# Patient Record
Sex: FEMALE | Race: WHITE | NOT HISPANIC OR LATINO | Employment: FULL TIME | URBAN - METROPOLITAN AREA
[De-identification: names, ages, dates, MRNs, and addresses within clinical notes are randomized per-mention and may not be internally consistent; named-entity substitution may affect disease eponyms.]

---

## 2017-11-22 ENCOUNTER — TRANSCRIBE ORDERS (OUTPATIENT)
Dept: ADMINISTRATIVE | Facility: HOSPITAL | Age: 42
End: 2017-11-22

## 2017-11-22 DIAGNOSIS — Z12.31 VISIT FOR SCREENING MAMMOGRAM: Primary | ICD-10-CM

## 2017-12-15 ENCOUNTER — TRANSCRIBE ORDERS (OUTPATIENT)
Dept: ADMINISTRATIVE | Facility: HOSPITAL | Age: 42
End: 2017-12-15

## 2017-12-15 DIAGNOSIS — N63.0 BREAST LUMP: Primary | ICD-10-CM

## 2017-12-15 DIAGNOSIS — N64.4 BREAST PAIN: ICD-10-CM

## 2017-12-22 ENCOUNTER — APPOINTMENT (OUTPATIENT)
Dept: MAMMOGRAPHY | Facility: HOSPITAL | Age: 42
End: 2017-12-22

## 2017-12-29 ENCOUNTER — HOSPITAL ENCOUNTER (OUTPATIENT)
Dept: MAMMOGRAPHY | Facility: HOSPITAL | Age: 42
Discharge: HOME | End: 2017-12-29

## 2017-12-29 ENCOUNTER — HOSPITAL ENCOUNTER (OUTPATIENT)
Dept: ULTRASOUND IMAGING | Facility: HOSPITAL | Age: 42
Discharge: HOME | End: 2017-12-29

## 2017-12-29 ENCOUNTER — HOSPITAL ENCOUNTER (OUTPATIENT)
Dept: MAMMOGRAPHY | Facility: HOSPITAL | Age: 42
Discharge: HOME | End: 2017-12-29
Admitting: PHYSICIAN ASSISTANT

## 2017-12-29 DIAGNOSIS — N64.4 BREAST PAIN: ICD-10-CM

## 2017-12-29 DIAGNOSIS — N63.0 BREAST LUMP: ICD-10-CM

## 2017-12-29 PROCEDURE — 77062 BREAST TOMOSYNTHESIS BI: CPT | Performed by: RADIOLOGY

## 2017-12-29 PROCEDURE — 88341 IMHCHEM/IMCYTCHM EA ADD ANTB: CPT | Performed by: RADIOLOGY

## 2017-12-29 PROCEDURE — 76942 ECHO GUIDE FOR BIOPSY: CPT

## 2017-12-29 PROCEDURE — 77066 DX MAMMO INCL CAD BI: CPT | Performed by: RADIOLOGY

## 2017-12-29 PROCEDURE — A4648 IMPLANTABLE TISSUE MARKER: HCPCS

## 2017-12-29 PROCEDURE — 88360 TUMOR IMMUNOHISTOCHEM/MANUAL: CPT | Performed by: RADIOLOGY

## 2017-12-29 PROCEDURE — 19083 BX BREAST 1ST LESION US IMAG: CPT | Performed by: RADIOLOGY

## 2017-12-29 PROCEDURE — 76642 ULTRASOUND BREAST LIMITED: CPT

## 2017-12-29 PROCEDURE — 11100: CPT | Performed by: RADIOLOGY

## 2017-12-29 PROCEDURE — 88305 TISSUE EXAM BY PATHOLOGIST: CPT | Performed by: RADIOLOGY

## 2017-12-29 PROCEDURE — 10022 US GUIDED FINE NEEDLE ASPIRATION BREAST: CPT | Performed by: RADIOLOGY

## 2017-12-29 PROCEDURE — 19084 BX BREAST ADD LESION US IMAG: CPT | Performed by: RADIOLOGY

## 2017-12-29 PROCEDURE — 76642 ULTRASOUND BREAST LIMITED: CPT | Performed by: RADIOLOGY

## 2017-12-29 PROCEDURE — G0279 TOMOSYNTHESIS, MAMMO: HCPCS

## 2017-12-29 PROCEDURE — 88173 CYTOPATH EVAL FNA REPORT: CPT | Performed by: RADIOLOGY

## 2017-12-29 PROCEDURE — 88342 IMHCHEM/IMCYTCHM 1ST ANTB: CPT | Performed by: RADIOLOGY

## 2017-12-29 PROCEDURE — G0204 DX MAMMO INCL CAD BI: HCPCS

## 2017-12-29 RX ORDER — LIDOCAINE HYDROCHLORIDE AND EPINEPHRINE 10; 10 MG/ML; UG/ML
10 INJECTION, SOLUTION INFILTRATION; PERINEURAL ONCE
Status: COMPLETED | OUTPATIENT
Start: 2017-12-29 | End: 2017-12-29

## 2017-12-29 RX ORDER — LIDOCAINE HYDROCHLORIDE 10 MG/ML
5 INJECTION, SOLUTION INFILTRATION; PERINEURAL ONCE
Status: COMPLETED | OUTPATIENT
Start: 2017-12-29 | End: 2017-12-29

## 2017-12-29 RX ORDER — LIDOCAINE HYDROCHLORIDE AND EPINEPHRINE 10; 10 MG/ML; UG/ML
10 INJECTION, SOLUTION INFILTRATION; PERINEURAL ONCE
Status: DISCONTINUED | OUTPATIENT
Start: 2017-12-29 | End: 2018-06-22 | Stop reason: HOSPADM

## 2017-12-29 RX ORDER — LIDOCAINE HYDROCHLORIDE 10 MG/ML
5 INJECTION, SOLUTION INFILTRATION; PERINEURAL ONCE
Status: DISCONTINUED | OUTPATIENT
Start: 2017-12-29 | End: 2018-06-22 | Stop reason: HOSPADM

## 2017-12-29 RX ADMIN — LIDOCAINE HYDROCHLORIDE,EPINEPHRINE BITARTRATE 4 ML: 10; .01 INJECTION, SOLUTION INFILTRATION; PERINEURAL at 13:28

## 2017-12-29 RX ADMIN — LIDOCAINE HYDROCHLORIDE 1 ML: 10 INJECTION, SOLUTION INFILTRATION; PERINEURAL at 13:28

## 2017-12-29 RX ADMIN — LIDOCAINE HYDROCHLORIDE 1 ML: 10 INJECTION, SOLUTION INFILTRATION; PERINEURAL at 13:36

## 2017-12-29 RX ADMIN — LIDOCAINE HYDROCHLORIDE 1 ML: 10 INJECTION, SOLUTION INFILTRATION; PERINEURAL at 13:30

## 2017-12-29 RX ADMIN — LIDOCAINE HYDROCHLORIDE,EPINEPHRINE BITARTRATE 1 ML: 10; .01 INJECTION, SOLUTION INFILTRATION; PERINEURAL at 13:36

## 2018-01-02 ENCOUNTER — TELEPHONE (OUTPATIENT)
Dept: MAMMOGRAPHY | Facility: HOSPITAL | Age: 43
End: 2018-01-02

## 2018-01-02 LAB
LAB AP CASE REPORT: NORMAL
Lab: NORMAL
PATH REPORT.FINAL DX SPEC: NORMAL

## 2018-01-02 NOTE — TELEPHONE ENCOUNTER
Patient left message on Saritha's phone asking about when her biopsy results would be ready. I returned her call and explained that it would be 1/3 or 1/4 at the earliest and maybe even longer depending on the back log at the lab. I told her someone would call her with the results as soon as we get them from the lab.

## 2018-01-03 LAB
CYTO UR: NORMAL
LAB AP CASE REPORT: NORMAL
LAB AP CLINICAL INFORMATION: NORMAL
LAB AP DIAGNOSIS COMMENT: NORMAL
LAB AP SPECIAL STAINS: NORMAL
Lab: NORMAL
PATH REPORT.FINAL DX SPEC: NORMAL
PATH REPORT.GROSS SPEC: NORMAL

## 2018-01-04 ENCOUNTER — TELEPHONE (OUTPATIENT)
Dept: MAMMOGRAPHY | Facility: HOSPITAL | Age: 43
End: 2018-01-04

## 2018-01-04 NOTE — TELEPHONE ENCOUNTER
01.04.18 @1530: Referring provider's office notified pathology returned as cancer & patient will be notified. Pt notified of pathology results and recommendation. Verbalizes understanding. Denies discomfort. Denies any signs and symptoms of infection.Patient desires Dr Canela for surgical consult. Patient notified of appointment with on 01.10.18 @ 0900. . Told to bring photo ID, insurance cards & list of current medications. Patient was encouraged to call back with any questions or concerns. Patient verbalized understanding. Breast cancer information packet offered and accepted.

## 2018-01-10 ENCOUNTER — CONSULT (OUTPATIENT)
Dept: ONCOLOGY | Facility: CLINIC | Age: 43
End: 2018-01-10

## 2018-01-10 VITALS
TEMPERATURE: 98.3 F | BODY MASS INDEX: 25.18 KG/M2 | SYSTOLIC BLOOD PRESSURE: 141 MMHG | WEIGHT: 170 LBS | RESPIRATION RATE: 14 BRPM | HEART RATE: 71 BPM | DIASTOLIC BLOOD PRESSURE: 83 MMHG | HEIGHT: 69 IN

## 2018-01-10 DIAGNOSIS — I42.7 CHEMOTHERAPY INDUCED CARDIOMYOPATHY (HCC): Primary | ICD-10-CM

## 2018-01-10 DIAGNOSIS — T45.1X5A CHEMOTHERAPY INDUCED CARDIOMYOPATHY (HCC): Primary | ICD-10-CM

## 2018-01-10 DIAGNOSIS — C50.911 MALIGNANT NEOPLASM OF RIGHT BREAST IN FEMALE, ESTROGEN RECEPTOR NEGATIVE, UNSPECIFIED SITE OF BREAST (HCC): ICD-10-CM

## 2018-01-10 DIAGNOSIS — Z17.1 MALIGNANT NEOPLASM OF RIGHT BREAST IN FEMALE, ESTROGEN RECEPTOR NEGATIVE, UNSPECIFIED SITE OF BREAST (HCC): ICD-10-CM

## 2018-01-10 PROCEDURE — 99205 OFFICE O/P NEW HI 60 MIN: CPT | Performed by: INTERNAL MEDICINE

## 2018-01-10 RX ORDER — PROCHLORPERAZINE MALEATE 10 MG
10 TABLET ORAL EVERY 6 HOURS PRN
Qty: 60 TABLET | Refills: 5 | Status: SHIPPED | OUTPATIENT
Start: 2018-01-10 | End: 2019-08-30

## 2018-01-10 RX ORDER — LIDOCAINE AND PRILOCAINE 25; 25 MG/G; MG/G
CREAM TOPICAL AS NEEDED
Qty: 30 G | Refills: 3 | Status: SHIPPED | OUTPATIENT
Start: 2018-01-10 | End: 2018-07-31 | Stop reason: SDDI

## 2018-01-10 RX ORDER — TRAMADOL HYDROCHLORIDE 50 MG/1
TABLET ORAL
Refills: 1 | COMMUNITY
Start: 2017-12-04 | End: 2018-02-09 | Stop reason: SDUPTHER

## 2018-01-10 RX ORDER — DEXAMETHASONE 4 MG/1
TABLET ORAL
Qty: 10 TABLET | Refills: 3 | Status: SHIPPED | OUTPATIENT
Start: 2018-01-10 | End: 2018-01-23

## 2018-01-10 RX ORDER — LANOLIN ALCOHOL/MO/W.PET/CERES
1000 CREAM (GRAM) TOPICAL DAILY
COMMUNITY
End: 2018-06-14 | Stop reason: ALTCHOICE

## 2018-01-10 RX ORDER — METOPROLOL SUCCINATE 50 MG/1
TABLET, EXTENDED RELEASE ORAL
Refills: 5 | COMMUNITY
Start: 2017-12-23 | End: 2018-05-31 | Stop reason: DRUGHIGH

## 2018-01-10 RX ORDER — ESTAZOLAM 2 MG/1
TABLET ORAL
COMMUNITY
Start: 2017-12-17 | End: 2018-01-23

## 2018-01-10 RX ORDER — AMITRIPTYLINE HYDROCHLORIDE 10 MG/1
TABLET, FILM COATED ORAL
Refills: 11 | COMMUNITY
Start: 2017-11-17 | End: 2018-02-12

## 2018-01-10 RX ORDER — ALPRAZOLAM 0.5 MG/1
TABLET ORAL
Refills: 2 | COMMUNITY
Start: 2017-12-17 | End: 2018-01-23 | Stop reason: DRUGHIGH

## 2018-01-10 RX ORDER — ONDANSETRON HYDROCHLORIDE 8 MG/1
8 TABLET, FILM COATED ORAL 3 TIMES DAILY PRN
Qty: 30 TABLET | Refills: 5 | Status: SHIPPED | OUTPATIENT
Start: 2018-01-10 | End: 2018-03-23 | Stop reason: SDUPTHER

## 2018-01-10 NOTE — PROGRESS NOTES
Subjective     PROBLEM LIST:  1. [id number] (stage IIIC) triple negative IDC of the right breast  A) patient presented with a enlarging mass in the right breast for 5-6 months associated with shooting pain.  Biopsy showed high grade invasive ductal carcinoma, ER negative, DC negative, HER-2 negative.  The mass on imaging measures approximately 9.7 cm.  Enlarged abnormal lymph nodes are present on imaging.  FNA of 1 axillary lymph node was negative for malignancy.  2.  Hypertension  3.  Anxiety  4.  Bell's palsy    CHIEF COMPLAINT: Newly diagnosed breast cancer      HISTORY OF PRESENT ILLNESS:  The patient is a 42 y.o. year old female, referred for evaluation of newly diagnosed triple negative breast cancer with a large mass in the right breast.  She says that she first noticed a mass in September.  Since then it has continued to increase in size.  It is painful during her periods with sharp shooting pains, and a dull throbbing pain at other times.  The pain has been worse over the past month.  She has been using tramadol but continues to be uncomfortable.  She had a biopsy which showed a high-grade invasive ductal carcinoma, triple negative.    REVIEW OF SYSTEMS:  A 14 point review of systems was performed and is negative except as noted above.    Past Medical History:   Diagnosis Date   • Arthritis    • Breast cancer, right 1/10/2018   • Endometriosis    • H/O Bell's palsy    • High blood pressure        GYN History: Menarche age 13.  .  First birth age 27.  Last menstrual period 2018    No current outpatient prescriptions on file prior to visit.     Current Facility-Administered Medications on File Prior to Visit   Medication Dose Route Frequency Provider Last Rate Last Dose   • lidocaine (XYLOCAINE) 1 % injection 5 mL  5 mL Infiltration Once JAYCEE Dawkins       • lidocaine-EPINEPHrine (XYLOCAINE W/EPI) 1 %-1:811532 injection 10 mL  10 mL Infiltration Once JAYCEE Dawkins      "      Allergies   Allergen Reactions   • Codeine GI Intolerance       Past Surgical History:   Procedure Laterality Date   • BREAST BIOPSY Bilateral     and a lymphnode on the right. also skin punch biopsies   • DILATATION AND CURETTAGE     • ENDOMETRIAL ABLATION         Social History     Social History   • Marital status:      Spouse name: N/A   • Number of children: N/A   • Years of education: N/A     Social History Main Topics   • Smoking status: Current Every Day Smoker     Types: Electronic Cigarette   • Smokeless tobacco: Never Used   • Alcohol use No   • Drug use: No   • Sexual activity: Defer     Other Topics Concern   • None     Social History Narrative   She is single and has 2 children who live with her ages 15 and 10.  She works as a  for an [employer] firm    Family History   Problem Relation Age of Onset   • Breast cancer Cousin 25       Objective     /83  Pulse 71  Temp 98.3 °F (36.8 °C) (Temporal Artery )   Resp 14  Ht 174 cm (68.5\")  Wt 77.1 kg (170 lb)  BMI 25.47 kg/m2  Performance Status: 0  General: well appearing female in no acute distress  Neuro: alert and oriented  HEENT: sclera anicteric, oropharynx clear  Breast: In the right breast upper outer quadrant there is a large firm movable mass with some overlying erythema of the skin.  The mass is approximately 10 cm in diameter there is mild nipple inversion on the right  Lymphatics: no cervical, supraclavicular, or axillary adenopathy  Cardiovascular: regular rate and rhythm, no murmurs  Lungs: clear to auscultation bilaterally  Abdomen: soft, nontender, nondistended.  No palpable organomegaly  Extremeties: no lower extremity edema  Skin: no rashes, lesions, bruising, or petechiae  Psych: mood and affect appropriate            Assessment/Plan     Estefany Bryan is a 42 y.o. year old female with a clinical stage T3 N1 triple negative breast cancer who presents with a large symptomatic breast mass.  " Despite the negative FNA of axillary nodes, her lymph nodes on imaging are suspicious for involvement.  There is a question of chest wall involvement based on her mammogram and ultrasound imaging.  She has seen Dr. Adeel Powers and was referred here for discussion of neoadjuvant chemotherapy treatment.  I agree that neoadjuvant chemotherapy is appropriate in this situation.  We discussed treatment with dose dense Adriamycin and Cytoxan followed by Taxol.   We reviewed the side effects of this regimen including nausea, fatigue, myelosuppression, infusion reaction, cardiotoxicity, myelodysplasia, neuropathy, alopecia, and constipation or diarrhea.  We may consider repeat imaging of the breast after 4 cycles of Adriamycin and Cytoxan if there is not very obvious improvement on clinical exam.  We also discussed the potential of adding carboplatin to her treatment if she has a less than ideal response to AC.  We discussed that chemotherapy may shut down her ovaries permanently in place her into permanent menopause.  We also discussed that this is a very high risk cancer and while we will treat her aggressively to try to give her the best chance of survival, it is still possible that her cancer will come back at some point down the road.    I recommend a PET scan for staging prior to beginning treatment.  We will also do a baseline echocardiogram.  She should be receiving a breast MRI next week as well as being scheduled for port placement.  We will plan to start treatment the week after next.    Discussed with Dr. Adeel Powers.             Winsome Romano MD    1/10/2018

## 2018-01-11 ENCOUNTER — DOCUMENTATION (OUTPATIENT)
Dept: OTHER | Facility: HOSPITAL | Age: 43
End: 2018-01-11

## 2018-01-11 NOTE — PROGRESS NOTES
I saw patient with Dr SOUZA and patients parents yesterday in Multidisciplinary Breast Conference. Dr SOUZA reviewed the patients pathology report and imaging studies. He discussed treatement options with the recommendation of scot-adjuvant chemotherapy with tumor size of 9cm. Patient verablized understanding and is willing to proceed with this plan - the patient did state that eventually she would like to have bilateral mastectomies with reconstruction. HG IDC ER/CA and HER 2 negative- Stage IIB. I reviewed educational and supportive materials with the patient along with a Chemo  Booklet and ACS Wig catalogue. The patient will have a port placed - Dr SOUZA reviewed this with the patient.  Genetics will be scheduled.

## 2018-01-12 ENCOUNTER — HOSPITAL ENCOUNTER (OUTPATIENT)
Dept: PET IMAGING | Facility: HOSPITAL | Age: 43
Discharge: HOME | End: 2018-01-12
Attending: INTERNAL MEDICINE | Admitting: INTERNAL MEDICINE

## 2018-01-12 ENCOUNTER — HOSPITAL ENCOUNTER (OUTPATIENT)
Dept: CARDIOLOGY | Facility: HOSPITAL | Age: 43
Discharge: HOME | End: 2018-01-12
Attending: INTERNAL MEDICINE

## 2018-01-12 ENCOUNTER — HOSPITAL ENCOUNTER (OUTPATIENT)
Dept: PET IMAGING | Facility: HOSPITAL | Age: 43
Discharge: HOME | End: 2018-01-12
Attending: INTERNAL MEDICINE

## 2018-01-12 VITALS — WEIGHT: 220 LBS | BODY MASS INDEX: 33.34 KG/M2 | HEIGHT: 68 IN

## 2018-01-12 DIAGNOSIS — C50.911 MALIGNANT NEOPLASM OF RIGHT BREAST IN FEMALE, ESTROGEN RECEPTOR NEGATIVE, UNSPECIFIED SITE OF BREAST (HCC): ICD-10-CM

## 2018-01-12 DIAGNOSIS — Z17.1 MALIGNANT NEOPLASM OF RIGHT BREAST IN FEMALE, ESTROGEN RECEPTOR NEGATIVE, UNSPECIFIED SITE OF BREAST (HCC): ICD-10-CM

## 2018-01-12 DIAGNOSIS — T45.1X5A CHEMOTHERAPY INDUCED CARDIOMYOPATHY (HCC): ICD-10-CM

## 2018-01-12 DIAGNOSIS — I42.7 CHEMOTHERAPY INDUCED CARDIOMYOPATHY (HCC): ICD-10-CM

## 2018-01-12 LAB
BH CV ECHO MEAS - AO ROOT AREA: 9.6 CM^2
BH CV ECHO MEAS - AO ROOT DIAM: 3.5 CM
BH CV ECHO MEAS - ASC AORTA: 3.3 CM
BH CV ECHO MEAS - CONTRAST EF (2CH): 66.7 ML/M^2
BH CV ECHO MEAS - CONTRAST EF 4CH: 60.5 ML/M^2
BH CV ECHO MEAS - EDV(CUBED): 82.9 ML
BH CV ECHO MEAS - EDV(MOD-SP2): 51 ML
BH CV ECHO MEAS - EDV(MOD-SP4): 38 ML
BH CV ECHO MEAS - EDV(TEICH): 85.8 ML
BH CV ECHO MEAS - EF(CUBED): 69.7 %
BH CV ECHO MEAS - EF(MOD-SP2): 66.7 %
BH CV ECHO MEAS - EF(MOD-SP4): 63 %
BH CV ECHO MEAS - EF(TEICH): 61.5 %
BH CV ECHO MEAS - ESV(CUBED): 25.2 ML
BH CV ECHO MEAS - ESV(MOD-SP2): 17 ML
BH CV ECHO MEAS - ESV(MOD-SP4): 15 ML
BH CV ECHO MEAS - ESV(TEICH): 33 ML
BH CV ECHO MEAS - FS: 32.8 %
BH CV ECHO MEAS - IVS/LVPW: 1
BH CV ECHO MEAS - IVSD: 1.2 CM
BH CV ECHO MEAS - LA DIMENSION: 2.4 CM
BH CV ECHO MEAS - LA/AO: 0.69
BH CV ECHO MEAS - LAT PEAK E' VEL: 12 CM/SEC
BH CV ECHO MEAS - LV MASS(C)D: 171.9 GRAMS
BH CV ECHO MEAS - LVIDD: 4.4 CM
BH CV ECHO MEAS - LVIDS: 2.9 CM
BH CV ECHO MEAS - LVLD AP2: 6.4 CM
BH CV ECHO MEAS - LVLD AP4: 6.9 CM
BH CV ECHO MEAS - LVLS AP2: 5.8 CM
BH CV ECHO MEAS - LVLS AP4: 5.6 CM
BH CV ECHO MEAS - LVPWD: 1.1 CM
BH CV ECHO MEAS - MED PEAK E' VEL: 9.1 CM/SEC
BH CV ECHO MEAS - MV A MAX VEL: 76.5 CM/SEC
BH CV ECHO MEAS - MV DEC SLOPE: 301 CM/SEC^2
BH CV ECHO MEAS - MV DEC TIME: 0.28 SEC
BH CV ECHO MEAS - MV E MAX VEL: 79.5 CM/SEC
BH CV ECHO MEAS - MV E/A: 1
BH CV ECHO MEAS - PA ACC SLOPE: 442 CM/SEC^2
BH CV ECHO MEAS - PA ACC TIME: 0.13 SEC
BH CV ECHO MEAS - PA PR(ACCEL): 21.9 MMHG
BH CV ECHO MEAS - RVDD: 2.4 CM
BH CV ECHO MEAS - SV(CUBED): 57.7 ML
BH CV ECHO MEAS - SV(MOD-SP2): 34 ML
BH CV ECHO MEAS - SV(MOD-SP4): 23 ML
BH CV ECHO MEAS - SV(TEICH): 52.8 ML
BH CV ECHO MEAS - TAPSE (>1.6): 1.7 CM2
BH CV VAS BP RIGHT ARM: NORMAL MMHG
BH CV XLRA - RV BASE: 3.6 CM
BH CV XLRA - RV LENGTH: 7 CM
BH CV XLRA - RV MID: 2.9 CM
BH CV XLRA - TDI S': 10.1 CM/SEC
E/E' RATIO: 10.5
GLUCOSE BLDC GLUCOMTR-MCNC: 88 MG/DL (ref 70–130)
LEFT ATRIUM VOLUME INDEX: 16 ML/M2
MAXIMAL PREDICTED HEART RATE: 178 BPM
STRESS TARGET HR: 151 BPM

## 2018-01-12 PROCEDURE — 82962 GLUCOSE BLOOD TEST: CPT

## 2018-01-12 PROCEDURE — 93306 TTE W/DOPPLER COMPLETE: CPT | Performed by: INTERNAL MEDICINE

## 2018-01-12 PROCEDURE — 0 FLUDEOXYGLUCOSE F18 SOLUTION: Performed by: INTERNAL MEDICINE

## 2018-01-12 PROCEDURE — 93306 TTE W/DOPPLER COMPLETE: CPT

## 2018-01-12 PROCEDURE — A9552 F18 FDG: HCPCS | Performed by: INTERNAL MEDICINE

## 2018-01-12 PROCEDURE — 78815 PET IMAGE W/CT SKULL-THIGH: CPT

## 2018-01-12 RX ADMIN — FLUDEOXYGLUCOSE F18 1 DOSE: 300 INJECTION INTRAVENOUS at 12:39

## 2018-01-15 ENCOUNTER — TELEPHONE (OUTPATIENT)
Dept: GENETICS | Facility: HOSPITAL | Age: 43
End: 2018-01-15

## 2018-01-15 ENCOUNTER — DOCUMENTATION (OUTPATIENT)
Dept: OTHER | Facility: HOSPITAL | Age: 43
End: 2018-01-15

## 2018-01-15 NOTE — PROGRESS NOTES
Called patient to let her know that I can measure her arms when she sees genetics- I transferred to get appointment scheduled to see genetics.

## 2018-01-16 ENCOUNTER — TELEPHONE (OUTPATIENT)
Dept: ONCOLOGY | Facility: CLINIC | Age: 43
End: 2018-01-16

## 2018-01-16 ENCOUNTER — TRANSCRIBE ORDERS (OUTPATIENT)
Dept: ADMINISTRATIVE | Facility: HOSPITAL | Age: 43
End: 2018-01-16

## 2018-01-16 ENCOUNTER — HOSPITAL ENCOUNTER (OUTPATIENT)
Dept: GENERAL RADIOLOGY | Facility: HOSPITAL | Age: 43
Discharge: HOME | End: 2018-01-16
Attending: SURGERY

## 2018-01-16 DIAGNOSIS — C50.411 MALIGNANT NEOPLASM OF UPPER-OUTER QUADRANT OF RIGHT FEMALE BREAST, UNSPECIFIED ESTROGEN RECEPTOR STATUS (HCC): Primary | ICD-10-CM

## 2018-01-16 DIAGNOSIS — C50.411 MALIGNANT NEOPLASM OF UPPER-OUTER QUADRANT OF RIGHT FEMALE BREAST, UNSPECIFIED ESTROGEN RECEPTOR STATUS (HCC): ICD-10-CM

## 2018-01-16 PROCEDURE — 71045 X-RAY EXAM CHEST 1 VIEW: CPT

## 2018-01-17 ENCOUNTER — HOSPITAL ENCOUNTER (OUTPATIENT)
Dept: MRI IMAGING | Facility: HOSPITAL | Age: 43
Discharge: HOME | End: 2018-01-17
Attending: SURGERY | Admitting: SURGERY

## 2018-01-17 DIAGNOSIS — C50.411 BREAST CANCER OF UPPER-OUTER QUADRANT OF RIGHT FEMALE BREAST (HCC): ICD-10-CM

## 2018-01-17 PROCEDURE — 77059 MRI BREAST BILATERAL DIAGNOSTIC W WO CONTRAST: CPT | Performed by: RADIOLOGY

## 2018-01-17 PROCEDURE — C8908 MRI W/O FOL W/CONT, BREAST,: HCPCS

## 2018-01-17 PROCEDURE — 0159T PR BREAST MRI, COMPUTER AIDED DETECTION: CPT | Performed by: RADIOLOGY

## 2018-01-17 PROCEDURE — A9577 INJ MULTIHANCE: HCPCS | Performed by: SURGERY

## 2018-01-17 PROCEDURE — 0 GADOBENATE DIMEGLUMINE 529 MG/ML SOLUTION: Performed by: SURGERY

## 2018-01-17 RX ADMIN — GADOBENATE DIMEGLUMINE 15 ML: 529 INJECTION, SOLUTION INTRAVENOUS at 10:00

## 2018-01-18 ENCOUNTER — APPOINTMENT (OUTPATIENT)
Dept: GENETICS | Facility: HOSPITAL | Age: 43
End: 2018-01-18

## 2018-01-18 ENCOUNTER — DOCUMENTATION (OUTPATIENT)
Dept: OTHER | Facility: HOSPITAL | Age: 43
End: 2018-01-18

## 2018-01-18 NOTE — PROGRESS NOTES
I spoke today to patient and her mother about their experience this morning when the patient came in for arm measurement and genetic counseling appointment - The patients mother said that the patient was very overwhelmed and could not complete the appointment with genetics. The patient said that she was not ready to reschedule at this time. I suggested that the patient see Ping ROGEL and -patient agreed - appointment sat for 1/24/18 @ 8AM.  The patient's mother stated that the patient is going through a lot of stress and worry in her personal life with her ex- and father of her 2 children - the patient says she went home and rested this afternoon and does plan to con't her care her at [facility], [place], operated by [facility].

## 2018-01-19 ENCOUNTER — TELEPHONE (OUTPATIENT)
Dept: MRI IMAGING | Facility: HOSPITAL | Age: 43
End: 2018-01-19

## 2018-01-19 NOTE — TELEPHONE ENCOUNTER
Called pt with MRI Breast results. Recommended Left Breast BX IF she was considering BCT. Pt is adamant about getting a Bilateral Mastectomy after her chemo, so a Breast Bx will not be pursued. Pt expressed verbal understanding and will call with any further questions or concerns.

## 2018-01-23 ENCOUNTER — DOCUMENTATION (OUTPATIENT)
Dept: NUTRITION | Facility: HOSPITAL | Age: 43
End: 2018-01-23

## 2018-01-23 ENCOUNTER — EDUCATION (OUTPATIENT)
Dept: ONCOLOGY | Facility: HOSPITAL | Age: 43
End: 2018-01-23

## 2018-01-23 ENCOUNTER — INFUSION (OUTPATIENT)
Dept: ONCOLOGY | Facility: HOSPITAL | Age: 43
End: 2018-01-23

## 2018-01-23 ENCOUNTER — OFFICE VISIT (OUTPATIENT)
Dept: ONCOLOGY | Facility: CLINIC | Age: 43
End: 2018-01-23

## 2018-01-23 VITALS
HEART RATE: 110 BPM | BODY MASS INDEX: 27 KG/M2 | RESPIRATION RATE: 16 BRPM | WEIGHT: 172 LBS | TEMPERATURE: 97.4 F | DIASTOLIC BLOOD PRESSURE: 83 MMHG | HEIGHT: 67 IN | SYSTOLIC BLOOD PRESSURE: 114 MMHG

## 2018-01-23 DIAGNOSIS — C50.911 MALIGNANT NEOPLASM OF RIGHT BREAST IN FEMALE, ESTROGEN RECEPTOR NEGATIVE, UNSPECIFIED SITE OF BREAST (HCC): Primary | ICD-10-CM

## 2018-01-23 DIAGNOSIS — Z17.1 MALIGNANT NEOPLASM OF RIGHT BREAST IN FEMALE, ESTROGEN RECEPTOR NEGATIVE, UNSPECIFIED SITE OF BREAST (HCC): Primary | ICD-10-CM

## 2018-01-23 DIAGNOSIS — Z17.1 MALIGNANT NEOPLASM OF AREOLA OF RIGHT BREAST IN FEMALE, ESTROGEN RECEPTOR NEGATIVE (HCC): ICD-10-CM

## 2018-01-23 DIAGNOSIS — C50.011 MALIGNANT NEOPLASM OF AREOLA OF RIGHT BREAST IN FEMALE, ESTROGEN RECEPTOR NEGATIVE (HCC): ICD-10-CM

## 2018-01-23 LAB
ALBUMIN SERPL-MCNC: 4.1 G/DL (ref 3.2–4.8)
ALBUMIN/GLOB SERPL: 1.9 G/DL (ref 1.5–2.5)
ALP SERPL-CCNC: 64 U/L (ref 25–100)
ALT SERPL W P-5'-P-CCNC: 45 U/L (ref 7–40)
ANION GAP SERPL CALCULATED.3IONS-SCNC: 7 MMOL/L (ref 3–11)
AST SERPL-CCNC: 27 U/L (ref 0–33)
BILIRUB SERPL-MCNC: 0.4 MG/DL (ref 0.3–1.2)
BUN BLD-MCNC: 15 MG/DL (ref 9–23)
BUN/CREAT SERPL: 18.8 (ref 7–25)
CALCIUM SPEC-SCNC: 8.9 MG/DL (ref 8.7–10.4)
CHLORIDE SERPL-SCNC: 104 MMOL/L (ref 99–109)
CO2 SERPL-SCNC: 26 MMOL/L (ref 20–31)
CREAT BLD-MCNC: 0.8 MG/DL (ref 0.6–1.3)
ERYTHROCYTE [DISTWIDTH] IN BLOOD BY AUTOMATED COUNT: 13.1 % (ref 11.3–14.5)
GFR SERPL CREATININE-BSD FRML MDRD: 79 ML/MIN/1.73
GLOBULIN UR ELPH-MCNC: 2.2 GM/DL
GLUCOSE BLD-MCNC: 85 MG/DL (ref 70–100)
HCT VFR BLD AUTO: 39.7 % (ref 34.5–44)
HGB BLD-MCNC: 12.5 G/DL (ref 11.5–15.5)
LYMPHOCYTES # BLD AUTO: 2 10*3/MM3 (ref 0.6–4.8)
LYMPHOCYTES NFR BLD AUTO: 28.6 % (ref 24–44)
MCH RBC QN AUTO: 29.2 PG (ref 27–31)
MCHC RBC AUTO-ENTMCNC: 31.5 G/DL (ref 32–36)
MCV RBC AUTO: 92.7 FL (ref 80–99)
MONOCYTES # BLD AUTO: 0.2 10*3/MM3 (ref 0–1)
MONOCYTES NFR BLD AUTO: 3.6 % (ref 0–12)
NEUTROPHILS # BLD AUTO: 4.7 10*3/MM3 (ref 1.5–8.3)
NEUTROPHILS NFR BLD AUTO: 67.8 % (ref 41–71)
PLATELET # BLD AUTO: 195 10*3/MM3 (ref 150–450)
PMV BLD AUTO: 8 FL (ref 6–12)
POTASSIUM BLD-SCNC: 4 MMOL/L (ref 3.5–5.5)
PROT SERPL-MCNC: 6.3 G/DL (ref 5.7–8.2)
RBC # BLD AUTO: 4.28 10*6/MM3 (ref 3.89–5.14)
SODIUM BLD-SCNC: 137 MMOL/L (ref 132–146)
WBC NRBC COR # BLD: 6.9 10*3/MM3 (ref 3.5–10.8)

## 2018-01-23 PROCEDURE — 25010000002 FOSAPREPITANT PER 1 MG: Performed by: INTERNAL MEDICINE

## 2018-01-23 PROCEDURE — 96368 THER/DIAG CONCURRENT INF: CPT

## 2018-01-23 PROCEDURE — 96413 CHEMO IV INFUSION 1 HR: CPT

## 2018-01-23 PROCEDURE — 96367 TX/PROPH/DG ADDL SEQ IV INF: CPT

## 2018-01-23 PROCEDURE — 96366 THER/PROPH/DIAG IV INF ADDON: CPT

## 2018-01-23 PROCEDURE — 25010000002 HEPARIN FLUSH (PORCINE) 100 UNIT/ML SOLUTION: Performed by: INTERNAL MEDICINE

## 2018-01-23 PROCEDURE — 80053 COMPREHEN METABOLIC PANEL: CPT

## 2018-01-23 PROCEDURE — 96377 APPLICATON ON-BODY INJECTOR: CPT

## 2018-01-23 PROCEDURE — 96417 CHEMO IV INFUS EACH ADDL SEQ: CPT

## 2018-01-23 PROCEDURE — 99214 OFFICE O/P EST MOD 30 MIN: CPT | Performed by: INTERNAL MEDICINE

## 2018-01-23 PROCEDURE — 25010000002 CYCLOPHOSPHAMIDE PER 100 MG: Performed by: INTERNAL MEDICINE

## 2018-01-23 PROCEDURE — 96411 CHEMO IV PUSH ADDL DRUG: CPT

## 2018-01-23 PROCEDURE — 96375 TX/PRO/DX INJ NEW DRUG ADDON: CPT

## 2018-01-23 PROCEDURE — 25010000002 DEXAMETHASONE PER 1 MG: Performed by: INTERNAL MEDICINE

## 2018-01-23 PROCEDURE — 25010000002 DOXORUBICIN PER 10 MG: Performed by: INTERNAL MEDICINE

## 2018-01-23 PROCEDURE — 25010000002 PALONOSETRON PER 25 MCG: Performed by: INTERNAL MEDICINE

## 2018-01-23 PROCEDURE — 85025 COMPLETE CBC W/AUTO DIFF WBC: CPT

## 2018-01-23 PROCEDURE — 96409 CHEMO IV PUSH SNGL DRUG: CPT

## 2018-01-23 PROCEDURE — 96376 TX/PRO/DX INJ SAME DRUG ADON: CPT

## 2018-01-23 PROCEDURE — 25010000002 PEGFILGRASTIM 6 MG/0.6ML PREFILLED SYRINGE KIT: Performed by: INTERNAL MEDICINE

## 2018-01-23 RX ORDER — DOXORUBICIN HYDROCHLORIDE 2 MG/ML
60 INJECTION, SOLUTION INTRAVENOUS ONCE
Status: CANCELLED | OUTPATIENT
Start: 2018-01-23

## 2018-01-23 RX ORDER — SODIUM CHLORIDE 9 MG/ML
250 INJECTION, SOLUTION INTRAVENOUS ONCE
Status: CANCELLED | OUTPATIENT
Start: 2018-01-23

## 2018-01-23 RX ORDER — DOXORUBICIN HYDROCHLORIDE 2 MG/ML
60 INJECTION, SOLUTION INTRAVENOUS ONCE
Status: COMPLETED | OUTPATIENT
Start: 2018-01-23 | End: 2018-01-23

## 2018-01-23 RX ORDER — ALPRAZOLAM 1 MG/1
1 TABLET ORAL 3 TIMES DAILY
Refills: 2 | COMMUNITY
Start: 2018-01-16

## 2018-01-23 RX ORDER — PALONOSETRON 0.05 MG/ML
0.25 INJECTION, SOLUTION INTRAVENOUS ONCE
Status: COMPLETED | OUTPATIENT
Start: 2018-01-23 | End: 2018-01-23

## 2018-01-23 RX ORDER — SODIUM CHLORIDE 0.9 % (FLUSH) 0.9 %
10 SYRINGE (ML) INJECTION AS NEEDED
Status: CANCELLED | OUTPATIENT
Start: 2018-01-23

## 2018-01-23 RX ORDER — SODIUM CHLORIDE 0.9 % (FLUSH) 0.9 %
10 SYRINGE (ML) INJECTION AS NEEDED
Status: DISCONTINUED | OUTPATIENT
Start: 2018-01-23 | End: 2018-01-23 | Stop reason: HOSPADM

## 2018-01-23 RX ORDER — SODIUM CHLORIDE 9 MG/ML
250 INJECTION, SOLUTION INTRAVENOUS ONCE
Status: COMPLETED | OUTPATIENT
Start: 2018-01-23 | End: 2018-01-23

## 2018-01-23 RX ORDER — PALONOSETRON 0.05 MG/ML
0.25 INJECTION, SOLUTION INTRAVENOUS ONCE
Status: CANCELLED | OUTPATIENT
Start: 2018-01-23

## 2018-01-23 RX ADMIN — CYCLOPHOSPHAMIDE 1150 MG: 1 INJECTION, POWDER, FOR SOLUTION INTRAVENOUS; ORAL at 11:44

## 2018-01-23 RX ADMIN — DOXORUBICIN HYDROCHLORIDE 116 MG: 2 INJECTION, SOLUTION INTRAVENOUS at 11:23

## 2018-01-23 RX ADMIN — PEGFILGRASTIM 6 MG: KIT SUBCUTANEOUS at 12:19

## 2018-01-23 RX ADMIN — Medication 10 ML: at 12:22

## 2018-01-23 RX ADMIN — PALONOSETRON HYDROCHLORIDE 0.25 MG: 0.25 INJECTION INTRAVENOUS at 10:09

## 2018-01-23 RX ADMIN — HEPARIN 500 UNITS: 100 SYRINGE at 12:22

## 2018-01-23 RX ADMIN — SODIUM CHLORIDE 250 ML: 9 INJECTION, SOLUTION INTRAVENOUS at 10:09

## 2018-01-23 RX ADMIN — DEXAMETHASONE SODIUM PHOSPHATE 12 MG: 4 INJECTION, SOLUTION INTRAMUSCULAR; INTRAVENOUS at 10:14

## 2018-01-23 RX ADMIN — SODIUM CHLORIDE 150 MG: 9 INJECTION, SOLUTION INTRAVENOUS at 10:14

## 2018-01-23 NOTE — PROGRESS NOTES
Oncology Nutrition Screening    Patient Name:  Estefany Bryan  YOB: 1975  MRN: [id number]  Date:  01/23/18  Physician:  Dr. Romano    Type of Cancer Treatment:   Chemotherapy:  Neoadjuvant DD Adriamycin / Cytoxan - every 14 days x 4 followed by Taxol    Patient Active Problem List   Diagnosis   • Breast cancer, right   • Malignant neoplasm of right breast in female, estrogen receptor negative       Current Outpatient Prescriptions   Medication Sig Dispense Refill   • ALPRAZolam (XANAX) 1 MG tablet TK 1 T PO TID  2   • amitriptyline (ELAVIL) 10 MG tablet TK 4 TS PO HS  11   • lidocaine-prilocaine (EMLA) 2.5-2.5 % cream Apply  topically As Needed (45-60 minutes prior to port access.  Cover with saran/plastic wrap.). 30 g 3   • metoprolol succinate XL (TOPROL-XL) 50 MG 24 hr tablet TK 1 T PO QD  5   • ondansetron (ZOFRAN) 8 MG tablet Take 1 tablet by mouth 3 (Three) Times a Day As Needed for Nausea or Vomiting. 30 tablet 5   • prochlorperazine (COMPAZINE) 10 MG tablet Take 1 tablet by mouth Every 6 (Six) Hours As Needed for Nausea or Vomiting. 60 tablet 5   • traMADol (ULTRAM) 50 MG tablet TK 1 T PO BID PRN  1   • vitamin B-12 (CYANOCOBALAMIN) 1000 MCG tablet Take 1,000 mcg by mouth Daily.       Current Facility-Administered Medications   Medication Dose Route Frequency Provider Last Rate Last Dose   • lidocaine (XYLOCAINE) 1 % injection 5 mL  5 mL Infiltration Once JAYCEE Dawkins       • lidocaine-EPINEPHrine (XYLOCAINE W/EPI) 1 %-1:762240 injection 10 mL  10 mL Infiltration Once JAYCEE Dawkins         Facility-Administered Medications Ordered in Other Visits   Medication Dose Route Frequency Provider Last Rate Last Dose   • heparin flush (porcine) 100 UNIT/ML injection 500 Units  500 Units Intravenous PRN Winsome Romano MD   500 Units at 01/23/18 1222   • sodium chloride 0.9 % flush 10 mL  10 mL Intravenous PRN Winsome Romano MD   10 mL at 01/23/18 1222       Glycemic Risk:  "  n/a    Weight:   Height: 67 inches  Weight: 172 lbs.  Usual Body Weight: ~160 lbs.   BMI: 26.9  Overweight  Weight has increased ~10 pounds over last ~1 month    Oral Food Intake:  Regular Diet - No Restrictions    Hydration Status:   How many 8 ounce glass of water of fluid do you drink per day?  Patient states she drinks water throughout the day.    Enteral Feeding:   n/a    Nutrition Symptoms:   No Problems with Eating    Activity:   Not assessed at this time.     reports that she has been smoking Electronic Cigarette.  She has never used smokeless tobacco. She reports that she does not drink alcohol or use illicit drugs.    Evaluation of Nutritional Risk:   Patient has been identified at mild nutritional risk due to diagnosis and treatment plan.  Met with patient and her mother during her initial chemotherapy infusion appointment.  Nutritional screening completed and confirmed by patient as above.    Discussed the importance of good nutrition during her treatment course focusing on adequate calorie, protein, nutrient and fluid intake to maintain her nutritional status.  Advised her to be eating smaller more frequent meals/snacks throughout the day to aid with potential nausea management.  Emphasized the importance of protein and its role in the diet; reviewed high protein foods; and recommended she have a protein source at each meal/snack.  Also emphasized the importance of hydration; reviewed good hydrating fluid options; and recommended she drink at least 64 ounces daily.  Briefly discussed the importance and basics of food safety.  Provided the new chemo patient packet and reviewed the ACS nutrition booklet and suggested she use it for symptom management as needed.  Also provided written diet material \"Nutritional Considerations in Breast Cancer\".    Answered their questions and both voiced understanding of information discussed.  RD's contact information provided and encouraged her to call if further " questions arise.  Will follow up as indicated.    Electronically signed by:  Muriel Muse RD  2:06 PM

## 2018-01-23 NOTE — PLAN OF CARE
Outpatient Infusion • [address] [facility] • [address] • [place] [id number] • [phone]      CHEMOTHERAPY EDUCATION SHEET    NAME:  Estefany Bryan      : 1975           DATE: 18    Booklets Given: Chemotherapy and You []  Eating Hints []    Sexuality/Fertility Books []     Chemotherapy/Biotherapy Education Sheets: (list all that apply)  Doxorubicin, Cyclophosphamide, Paclitaxel, Neulasta chemocare + patient calendar                                                                                                                                                                Chemotherapy Regimen:  Doxorubicin + Cyclophosphamide IV q2 weeks x 4 cycles, followed by paclitaxel weekly (Today is day 1, cycle 1)     TOPICS EDUCATION PROVIDED EDUCATION REINFORCED COMMENTS   ANEMIA:  role of RBC, cause, s/s, ways to manage, role of transfusion [x] [] Discussed the role of RBC and the potential for fatigue. Encouraged patient to maintain activity level.   THROMBOCYTOPENIA:  role of platelet, cause, s/s, ways to prevent bleeding, things to avoid, when to seek help [x] [] Discussed the role of platelets and potential for bleeds. Reviewed S&s of bleeds.   NEUTROPENIA:  role of WBC, cause, infection precautions, s/s of infection, when to call MD [x] [] Discussed the role of WBCs and the potential for infection. Instructed patient to monitor temperature at home and contact the clinic for a fever >100.4   NUTRITION & APPETITE CHANGES:  importance of maintaining healthy diet & weight, ways to manage to improve intake, dietary consult, exercise regimen [x] [] Patient met with dietitian today.   DIARRHEA:  causes, s/s of dehydration, ways to manage, dietary changes, when to call MD [x] [] Discussed potential for diarrhea and treatment options.   CONSTIPATION:  causes, ways to manage, dietary changes, when to call MD [x] [] Discussed potential for constipation and treatment options.   NAUSEA & VOMITING:  cause, use  of antiemetics, dietary changes, when to call MD [x] [] Discussed emetic potential and role of home Zofran and Compazine.   MOUTH SORES:  causes, oral care, ways to manage [x] [] Discussed potential for mouth sores and treatment options (mouth rinse and magic mouthwash.   ALOPECIA:  cause, ways to manage, resources [x] [] Discussed potential for hair loss.    INFERTILITY & SEXUALITY:  causes, fertility preservation options, sexuality changes, ways to manage, importance of birth control [] []    NERVOUS SYSTEM CHANGES:  causes, s/s, neuropathies, cognitive changes, ways to manage [x] [] Discussed potential for neuropathy and treatment options.   PAIN:  causes, ways to manage [] [] ????   SKIN & NAIL CHANGES:  cause, s/s, ways to manage [x] [] Discussed potential for a rash and treatment options.   ORGAN TOXICITIES:  cause, s/s, need for diagnostic tests, labs, when to notify MD [x] [] Discussed the potential for hemorrhagic cystitis with cyclophosphamide. Instructed patient to present to ED with dysuria or hematuria.   SURVIVORSHIP:  distress, distress assessment, secondary malignancies, early/late effects, follow-up, social issues, social support [x] [] Discussed potential for and management of discoloration of urine, sweat, and tears with doxorubicin.    HOME CARE:  use of spill kits, storing of PO chemo, how to manage bodily fluids [] []    MISCELLANEOUS:  drug interactions, administration, vesicant, et [x] [] Discussed chemotherapy regimen and infusion schedule.     Referrals:        Notes:   Patient did not have any additional questions at this time. Instructed patient to contact the clinic or [facility] with any questions or concerns.    Thanks,    Theo Hammond, PharmD Candidate 2018

## 2018-01-23 NOTE — PROGRESS NOTES
"      PROBLEM LIST:  1. [id number] (stage IIIC) triple negative IDC of the right breast  A) patient presented with a enlarging mass in the right breast for 5-6 months associated with shooting pain.  Biopsy showed high grade invasive ductal carcinoma, ER negative, OK negative, HER-2 negative.  The mass on imaging measures approximately 9.7 cm.  Enlarged abnormal lymph nodes are present on imaging.  FNA of 1 axillary lymph node was negative for malignancy.  PET/CT on 1/12/18 showed large necrotic right breast mass, 2 pathologically enlarged and hypermetabolic intramammary nodes, and no evidence of distant disease.  Breast MRI on 1/17/18 showed evidence of pectoral muscle involvement, as well as several satellite lesions.  B) neoadjuvant ddAC followed by taxol started on 1/23/17.  2.  Hypertension  3.  Anxiety  4.  Bell's palsy    Subjective     HISTORY OF PRESENT ILLNESS:   Estefany Bryan returns for follow-up.   She is here to begin chemotherapy treatment today.  She had a port placed last week.   She had a breast MRI and a biopsy of the left breast was recommended, but she is planning on bilateral mastectomy.  She is feeling a bit anxious and overwhelmed but is ready to get started with treatment.  She had a skin reaction to whatever antiseptic was used on her skin where her IV for the MRI was placed.      Past Medical History, Past Surgical History, Social History, Family History have been reviewed and are without significant changes except as mentioned.    Review of Systems   A comprehensive 14 point review of systems was performed and was negative except as mentioned.    Medications:  The current medication list was reviewed in the EMR    ALLERGIES:    Allergies   Allergen Reactions   • Codeine GI Intolerance       Objective      /83 Comment: RUE  Pulse 110  Temp 97.4 °F (36.3 °C) (Oral)   Resp 16  Ht 170.2 cm (67\")  Wt 78 kg (172 lb)  BMI 26.94 kg/m2     Performance Status: 0    General: well " appearing female in no acute distress  Neuro: alert and oriented  HEENT: sclera anicteric, oropharynx clear  Breast: In the right breast upper outer quadrant there is a large firm movable mass with some overlying erythema of the skin.  The mass is approximately 10 cm in diameter there is mild nipple inversion on the right  Lymphatics: no cervical, supraclavicular, or axillary adenopathy  Cardiovascular: regular rate and rhythm, no murmurs  Lungs: clear to auscultation bilaterally  Abdomen: soft, nontender, nondistended.  No palpable organomegaly  Extremeties: no lower extremity edema  Skin: no rashes, lesions, bruising, or petechiae  Psych: mood and affect appropriate    Imaging: PET/CT 1/12 shows large hypermetabolic right breast mass, SUV 15, 2 enlarged hypermetabolic nodes in the tail of stevenson.  Axillary nodes have max SUV of 1.15      Assessment/Plan   Estefany Bryan is a 42 y.o. year old female with [id number] triple negative breast cancer who presents for neoadjuvant therapy with ddAC followed by taxol.  We reviewed common side effects including fatigue, nausea, and constipation.  She reports that she has significant mood changes on steroids and would prefer not to take them.  I will take the dexamethasone off of her medication list and if she has trouble with nausea with this first round we will plan to use Zyprexa instead.  We did discuss that she will receive IV steroids with her treatment today.  I recommended that she use a condom if she is sexually active during her chemotherapy treatment.  I will plan to see her back again in 2 weeks for cycle 2.  After the completion of treatment she will undergo bilateral mastectomy followed by radiation.                  Visit time was 25 minutes, greater than 50% spent in counseling      Winsome Romano MD  [facility] Hematology and Oncology    1/23/2018          CC:

## 2018-01-24 ENCOUNTER — OFFICE VISIT (OUTPATIENT)
Dept: PSYCHIATRY | Facility: CLINIC | Age: 43
End: 2018-01-24

## 2018-01-24 ENCOUNTER — OFFICE VISIT (OUTPATIENT)
Dept: ONCOLOGY | Facility: CLINIC | Age: 43
End: 2018-01-24

## 2018-01-24 VITALS
TEMPERATURE: 98.2 F | HEIGHT: 67 IN | BODY MASS INDEX: 27.31 KG/M2 | SYSTOLIC BLOOD PRESSURE: 113 MMHG | HEART RATE: 105 BPM | RESPIRATION RATE: 16 BRPM | WEIGHT: 174 LBS | DIASTOLIC BLOOD PRESSURE: 78 MMHG

## 2018-01-24 DIAGNOSIS — F33.1 MODERATE EPISODE OF RECURRENT MAJOR DEPRESSIVE DISORDER (HCC): ICD-10-CM

## 2018-01-24 DIAGNOSIS — Z17.1 MALIGNANT NEOPLASM OF RIGHT BREAST IN FEMALE, ESTROGEN RECEPTOR NEGATIVE, UNSPECIFIED SITE OF BREAST (HCC): Primary | ICD-10-CM

## 2018-01-24 DIAGNOSIS — F51.05 INSOMNIA DUE TO MENTAL CONDITION: ICD-10-CM

## 2018-01-24 DIAGNOSIS — C50.911 MALIGNANT NEOPLASM OF RIGHT BREAST IN FEMALE, ESTROGEN RECEPTOR NEGATIVE, UNSPECIFIED SITE OF BREAST (HCC): Primary | ICD-10-CM

## 2018-01-24 DIAGNOSIS — F41.1 GENERALIZED ANXIETY DISORDER: Primary | ICD-10-CM

## 2018-01-24 PROCEDURE — 90792 PSYCH DIAG EVAL W/MED SRVCS: CPT | Performed by: NURSE PRACTITIONER

## 2018-01-24 PROCEDURE — 99213 OFFICE O/P EST LOW 20 MIN: CPT | Performed by: INTERNAL MEDICINE

## 2018-01-24 RX ORDER — VENLAFAXINE HYDROCHLORIDE 37.5 MG/1
37.5 CAPSULE, EXTENDED RELEASE ORAL DAILY
Qty: 30 CAPSULE | Refills: 1 | Status: SHIPPED | OUTPATIENT
Start: 2018-01-24 | End: 2018-02-12 | Stop reason: SDUPTHER
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
## 2018-01-24 NOTE — PROGRESS NOTES
PROBLEM LIST:  1. [id number] (stage IIIC) triple negative IDC of the right breast  A) patient presented with a enlarging mass in the right breast for 5-6 months associated with shooting pain.  Biopsy showed high grade invasive ductal carcinoma, ER negative, [place] negative, HER-2 negative.  The mass on imaging measures approximately 9.7 cm.  Enlarged abnormal lymph nodes are present on imaging.  FNA of 1 axillary lymph node was negative for malignancy.  PET/CT on 1/12/18 showed large necrotic right breast mass, 2 pathologically enlarged and hypermetabolic intramammary nodes, and no evidence of distant disease.  Breast MRI on 1/17/18 showed evidence of pectoral muscle involvement, as well as several satellite lesions.  B) neoadjuvant ddAC followed by taxol started on 1/23/17.  2.  Hypertension  3.  Anxiety  4.  Bell's palsy    Subjective     HISTORY OF PRESENT ILLNESS:   Estefany Bryan returns today for evaluation of leg pain.  She received her first dose of chemotherapy yesterday with AC.  The evening after treatment she said she had the relatively sudden onset of severe bilateral hip pain and knee pain.  For about 5 or 6 hours she had trouble even getting out of bed.  She tried taking Claritin and tramadol which didn't really help.  She did notice that each time she got up to go to the bathroom he did get a little bit easier.  This morning the leg pain has mostly resolved.  She feels a little bit sore in her knees and stiffness in her elbows but she is moving around without difficulty.    Past Medical History, Past Surgical History, Social History, Family History have been reviewed and are without significant changes except as mentioned.    Review of Systems   A comprehensive 14 point review of systems was performed and was negative except as mentioned.    Medications:  The current medication list was reviewed in the EMR    ALLERGIES:    Allergies   Allergen Reactions   • Chlorhexidine Other (See Comments)  "    Pt. developed redness and burning sensation after using.   • Codeine GI Intolerance       Objective      /78  Pulse 105  Temp 98.2 °F (36.8 °C)  Resp 16  Ht 170.2 cm (67\")  Wt 78.9 kg (174 lb)  BMI 27.25 kg/m2     Performance Status: 0    General: well appearing female in no acute distress  Neuro: alert and oriented  Extremeties: no lower extremity edema.  Normal range of motion of hips and knees bilaterally  Skin: no rashes, lesions, bruising, or petechiae  Psych: mood and affect appropriate          Assessment/Plan   Estefany Bryan is a 42 y.o. year old female with [id number] triple negative breast cancer who comes in one day after receiving her first dose of Adriamycin and Cytoxan with complaints of a episode of severe joint pain the last 5 or 6 hours.  This is not a typical side effect of chemotherapy, but the timing suggests that it was triggered by her treatment.  We discussed the option of taking an extra dose of steroids in the evening, but given the effect of steroids on her mood she would prefer not to do this.  I suggested she try ibuprofen 800 mg or Tylenol 1000 mg as needed if the pain recurs.  Otherwise she will follow-up as scheduled in 2 weeks for her next dose.                Visit time was 15 minutes, greater than 50% spent in counseling      Winsome Romano MD  [facility]    1/24/2018          CC:          "

## 2018-01-25 ENCOUNTER — DOCUMENTATION (OUTPATIENT)
Dept: OTHER | Facility: HOSPITAL | Age: 43
End: 2018-01-25

## 2018-01-25 NOTE — PROGRESS NOTES
"Patients mother called help line yesterday - message retrieved today. I called and talked to mother - she said all was well now - that [employer] was so helpful and just what patient needed - She said today was a good day that the patient was working. She said that the infusion nurse that she had yesterday \"deserved a metal of honor\" she was so wonderful. I told patient's mother to call me with any questions.   "

## 2018-01-30 ENCOUNTER — TELEPHONE (OUTPATIENT)
Dept: ONCOLOGY | Facility: CLINIC | Age: 43
End: 2018-01-30

## 2018-01-30 NOTE — TELEPHONE ENCOUNTER
Returned phone call. Spoke with patient's mother. Let her know I would call in some magic mouth wash to help with mouth sores. This rx has to be sent to [facility] pharmacy due to it being a compound medication.

## 2018-01-30 NOTE — TELEPHONE ENCOUNTER
----- Message from Darcie Obando sent at 1/30/2018  9:38 AM EST -----  Regarding: [facility] - RX   Contact: [phone]  Patient has moth sores, and they are bleeding. Can you send in something to pharmacy?     Pharmacy: [facility] in [place]

## 2018-01-31 ENCOUNTER — APPOINTMENT (OUTPATIENT)
Dept: MAMMOGRAPHY | Facility: HOSPITAL | Age: 43
End: 2018-01-31

## 2018-02-02 ENCOUNTER — TELEPHONE (OUTPATIENT)
Dept: ONCOLOGY | Facility: CLINIC | Age: 43
End: 2018-02-02

## 2018-02-02 RX ORDER — CEPHALEXIN 500 MG/1
500 CAPSULE ORAL 3 TIMES DAILY
Qty: 30 CAPSULE | Refills: 0 | Status: SHIPPED | OUTPATIENT
Start: 2018-02-02 | End: 2018-04-10

## 2018-02-02 NOTE — TELEPHONE ENCOUNTER
Returned call to patient. She says her right breast has swollen 4x bigger than her left. It is red and tender. She has tried tylenol, ibuprofen, and Claritin with no relief. Dr. Romano would like for her to try a round of antibiotics.  Told patient I would send Keflex into her pharmacy. Take 3x/day for 10 days. Call me back Monday if no improvement. If it does start to get better, we will see her at scheduled apt next Tuesday.

## 2018-02-06 ENCOUNTER — INFUSION (OUTPATIENT)
Dept: ONCOLOGY | Facility: HOSPITAL | Age: 43
End: 2018-02-06

## 2018-02-06 ENCOUNTER — OFFICE VISIT (OUTPATIENT)
Dept: ONCOLOGY | Facility: CLINIC | Age: 43
End: 2018-02-06

## 2018-02-06 ENCOUNTER — DOCUMENTATION (OUTPATIENT)
Dept: SOCIAL WORK | Facility: HOSPITAL | Age: 43
End: 2018-02-06

## 2018-02-06 VITALS
RESPIRATION RATE: 16 BRPM | WEIGHT: 175.4 LBS | OXYGEN SATURATION: 100 % | HEART RATE: 95 BPM | HEIGHT: 67 IN | BODY MASS INDEX: 27.53 KG/M2 | SYSTOLIC BLOOD PRESSURE: 109 MMHG | DIASTOLIC BLOOD PRESSURE: 80 MMHG | TEMPERATURE: 98 F

## 2018-02-06 DIAGNOSIS — T45.1X5A CHEMOTHERAPY INDUCED CARDIOMYOPATHY (HCC): Primary | ICD-10-CM

## 2018-02-06 DIAGNOSIS — Z17.1 MALIGNANT NEOPLASM OF RIGHT BREAST IN FEMALE, ESTROGEN RECEPTOR NEGATIVE, UNSPECIFIED SITE OF BREAST (HCC): Primary | ICD-10-CM

## 2018-02-06 DIAGNOSIS — C50.011 MALIGNANT NEOPLASM OF AREOLA OF RIGHT BREAST IN FEMALE, ESTROGEN RECEPTOR NEGATIVE (HCC): ICD-10-CM

## 2018-02-06 DIAGNOSIS — I42.7 CHEMOTHERAPY INDUCED CARDIOMYOPATHY (HCC): Primary | ICD-10-CM

## 2018-02-06 DIAGNOSIS — Z17.1 MALIGNANT NEOPLASM OF AREOLA OF RIGHT BREAST IN FEMALE, ESTROGEN RECEPTOR NEGATIVE (HCC): ICD-10-CM

## 2018-02-06 DIAGNOSIS — C50.911 MALIGNANT NEOPLASM OF RIGHT BREAST IN FEMALE, ESTROGEN RECEPTOR NEGATIVE, UNSPECIFIED SITE OF BREAST (HCC): Primary | ICD-10-CM

## 2018-02-06 DIAGNOSIS — Z17.1 MALIGNANT NEOPLASM OF RIGHT BREAST IN FEMALE, ESTROGEN RECEPTOR NEGATIVE, UNSPECIFIED SITE OF BREAST (HCC): ICD-10-CM

## 2018-02-06 DIAGNOSIS — C50.911 MALIGNANT NEOPLASM OF RIGHT BREAST IN FEMALE, ESTROGEN RECEPTOR NEGATIVE, UNSPECIFIED SITE OF BREAST (HCC): ICD-10-CM

## 2018-02-06 LAB
ALBUMIN SERPL-MCNC: 3.9 G/DL (ref 3.2–4.8)
ALBUMIN/GLOB SERPL: 1.6 G/DL (ref 1.5–2.5)
ALP SERPL-CCNC: 81 U/L (ref 25–100)
ALT SERPL W P-5'-P-CCNC: 37 U/L (ref 7–40)
ANION GAP SERPL CALCULATED.3IONS-SCNC: 5 MMOL/L (ref 3–11)
AST SERPL-CCNC: 23 U/L (ref 0–33)
BILIRUB SERPL-MCNC: 0.2 MG/DL (ref 0.3–1.2)
BUN BLD-MCNC: 14 MG/DL (ref 9–23)
BUN/CREAT SERPL: 20 (ref 7–25)
CALCIUM SPEC-SCNC: 9 MG/DL (ref 8.7–10.4)
CHLORIDE SERPL-SCNC: 107 MMOL/L (ref 99–109)
CO2 SERPL-SCNC: 25 MMOL/L (ref 20–31)
CREAT BLD-MCNC: 0.7 MG/DL (ref 0.6–1.3)
ERYTHROCYTE [DISTWIDTH] IN BLOOD BY AUTOMATED COUNT: 13.2 % (ref 11.3–14.5)
GFR SERPL CREATININE-BSD FRML MDRD: 92 ML/MIN/1.73
GLOBULIN UR ELPH-MCNC: 2.4 GM/DL
GLUCOSE BLD-MCNC: 85 MG/DL (ref 70–100)
HCT VFR BLD AUTO: 34 % (ref 34.5–44)
HGB BLD-MCNC: 10.9 G/DL (ref 11.5–15.5)
LYMPHOCYTES # BLD AUTO: 1.7 10*3/MM3 (ref 0.6–4.8)
LYMPHOCYTES NFR BLD AUTO: 23.2 % (ref 24–44)
MCH RBC QN AUTO: 29.1 PG (ref 27–31)
MCHC RBC AUTO-ENTMCNC: 32 G/DL (ref 32–36)
MCV RBC AUTO: 91 FL (ref 80–99)
MONOCYTES # BLD AUTO: 0.4 10*3/MM3 (ref 0–1)
MONOCYTES NFR BLD AUTO: 4.8 % (ref 0–12)
NEUTROPHILS # BLD AUTO: 5.3 10*3/MM3 (ref 1.5–8.3)
NEUTROPHILS NFR BLD AUTO: 72 % (ref 41–71)
PLATELET # BLD AUTO: 263 10*3/MM3 (ref 150–450)
PMV BLD AUTO: 7.8 FL (ref 6–12)
POTASSIUM BLD-SCNC: 4.2 MMOL/L (ref 3.5–5.5)
PROT SERPL-MCNC: 6.3 G/DL (ref 5.7–8.2)
RBC # BLD AUTO: 3.74 10*6/MM3 (ref 3.89–5.14)
SODIUM BLD-SCNC: 137 MMOL/L (ref 132–146)
WBC NRBC COR # BLD: 7.3 10*3/MM3 (ref 3.5–10.8)

## 2018-02-06 PROCEDURE — 25010000002 CYCLOPHOSPHAMIDE PER 100 MG: Performed by: INTERNAL MEDICINE

## 2018-02-06 PROCEDURE — 25010000002 FOSAPREPITANT PER 1 MG: Performed by: INTERNAL MEDICINE

## 2018-02-06 PROCEDURE — 96375 TX/PRO/DX INJ NEW DRUG ADDON: CPT

## 2018-02-06 PROCEDURE — 36415 COLL VENOUS BLD VENIPUNCTURE: CPT

## 2018-02-06 PROCEDURE — 96367 TX/PROPH/DG ADDL SEQ IV INF: CPT

## 2018-02-06 PROCEDURE — 96413 CHEMO IV INFUSION 1 HR: CPT

## 2018-02-06 PROCEDURE — 96411 CHEMO IV PUSH ADDL DRUG: CPT

## 2018-02-06 PROCEDURE — 85025 COMPLETE CBC W/AUTO DIFF WBC: CPT

## 2018-02-06 PROCEDURE — 96366 THER/PROPH/DIAG IV INF ADDON: CPT

## 2018-02-06 PROCEDURE — 25010000002 DOXORUBICIN PER 10 MG: Performed by: INTERNAL MEDICINE

## 2018-02-06 PROCEDURE — 25010000002 HEPARIN FLUSH (PORCINE) 100 UNIT/ML SOLUTION: Performed by: INTERNAL MEDICINE

## 2018-02-06 PROCEDURE — 96377 APPLICATON ON-BODY INJECTOR: CPT

## 2018-02-06 PROCEDURE — 25010000002 PEGFILGRASTIM 6 MG/0.6ML PREFILLED SYRINGE KIT: Performed by: INTERNAL MEDICINE

## 2018-02-06 PROCEDURE — 99214 OFFICE O/P EST MOD 30 MIN: CPT | Performed by: INTERNAL MEDICINE

## 2018-02-06 PROCEDURE — 25010000002 PALONOSETRON PER 25 MCG: Performed by: INTERNAL MEDICINE

## 2018-02-06 PROCEDURE — 25010000002 DEXAMETHASONE PER 1 MG: Performed by: INTERNAL MEDICINE

## 2018-02-06 PROCEDURE — 80053 COMPREHEN METABOLIC PANEL: CPT

## 2018-02-06 RX ORDER — LORATADINE 10 MG/1
1 CAPSULE, LIQUID FILLED ORAL DAILY
COMMUNITY
End: 2018-06-14

## 2018-02-06 RX ORDER — DOXORUBICIN HYDROCHLORIDE 2 MG/ML
60 INJECTION, SOLUTION INTRAVENOUS ONCE
Status: COMPLETED | OUTPATIENT
Start: 2018-02-06 | End: 2018-02-06

## 2018-02-06 RX ORDER — DOXORUBICIN HYDROCHLORIDE 2 MG/ML
60 INJECTION, SOLUTION INTRAVENOUS ONCE
Status: CANCELLED | OUTPATIENT
Start: 2018-02-06

## 2018-02-06 RX ORDER — MAGNESIUM OXIDE 400 MG/1
400 TABLET ORAL AS NEEDED
COMMUNITY
End: 2018-11-27

## 2018-02-06 RX ORDER — SODIUM CHLORIDE 9 MG/ML
250 INJECTION, SOLUTION INTRAVENOUS ONCE
Status: CANCELLED | OUTPATIENT
Start: 2018-02-06

## 2018-02-06 RX ORDER — PALONOSETRON 0.05 MG/ML
0.25 INJECTION, SOLUTION INTRAVENOUS ONCE
Status: CANCELLED | OUTPATIENT
Start: 2018-02-06

## 2018-02-06 RX ORDER — MULTIVIT-MIN/IRON/FOLIC ACID/K 18-600-40
2000 CAPSULE ORAL AS NEEDED
COMMUNITY
End: 2018-11-27

## 2018-02-06 RX ORDER — SODIUM CHLORIDE 0.9 % (FLUSH) 0.9 %
10 SYRINGE (ML) INJECTION AS NEEDED
Status: CANCELLED | OUTPATIENT
Start: 2018-02-06

## 2018-02-06 RX ORDER — PALONOSETRON 0.05 MG/ML
0.25 INJECTION, SOLUTION INTRAVENOUS ONCE
Status: COMPLETED | OUTPATIENT
Start: 2018-02-06 | End: 2018-02-06

## 2018-02-06 RX ORDER — SODIUM CHLORIDE 9 MG/ML
250 INJECTION, SOLUTION INTRAVENOUS ONCE
Status: DISCONTINUED | OUTPATIENT
Start: 2018-02-06 | End: 2018-02-06 | Stop reason: HOSPADM

## 2018-02-06 RX ORDER — OMEPRAZOLE 20 MG/1
20 CAPSULE, DELAYED RELEASE ORAL DAILY
COMMUNITY
End: 2018-04-10

## 2018-02-06 RX ADMIN — PEGFILGRASTIM 6 MG: KIT SUBCUTANEOUS at 13:21

## 2018-02-06 RX ADMIN — DEXAMETHASONE SODIUM PHOSPHATE 12 MG: 4 INJECTION, SOLUTION INTRAMUSCULAR; INTRAVENOUS at 11:59

## 2018-02-06 RX ADMIN — SODIUM CHLORIDE 150 MG: 9 INJECTION, SOLUTION INTRAVENOUS at 11:59

## 2018-02-06 RX ADMIN — CYCLOPHOSPHAMIDE 1150 MG: 1 INJECTION, POWDER, FOR SOLUTION INTRAVENOUS; ORAL at 12:46

## 2018-02-06 RX ADMIN — DOXORUBICIN HYDROCHLORIDE 116 MG: 2 INJECTION, SOLUTION INTRAVENOUS at 12:31

## 2018-02-06 RX ADMIN — PALONOSETRON HYDROCHLORIDE 0.25 MG: 0.25 INJECTION INTRAVENOUS at 11:54

## 2018-02-06 RX ADMIN — HEPARIN 500 UNITS: 100 SYRINGE at 13:25

## 2018-02-06 NOTE — PROGRESS NOTES
SW met with pt and her  during her infusion to provide support and resources and also to address her recent distress screening score of 9.  Pt states that her first treatment went fairly well and that her biggest concern today is extreme pain in her upper legs.  SW provided support to pt and explained the role of the oncology social worker.  Pt cannot think of any immediate resource needs at this time.  SW provided contact information to pt and encouraged her to call with any future needs or concerns.  SW available for ongoing support and resource needs.

## 2018-02-06 NOTE — PROGRESS NOTES
PROBLEM LIST:  1. [id number] (stage IIIC) triple negative IDC of the right breast  A) patient presented with a enlarging mass in the right breast for 5-6 months associated with shooting pain.  Biopsy showed high grade invasive ductal carcinoma, ER negative, [place] negative, HER-2 negative.  The mass on imaging measures approximately 9.7 cm.  Enlarged abnormal lymph nodes are present on imaging.  FNA of 1 axillary lymph node was negative for malignancy.  PET/CT on 1/12/18 showed large necrotic right breast mass, 2 pathologically enlarged and hypermetabolic intramammary nodes, and no evidence of distant disease.  Breast MRI on 1/17/18 showed evidence of pectoral muscle involvement, as well as several satellite lesions.  B) neoadjuvant ddAC followed by taxol started on 1/23/17.  2.  Hypertension  3.  Anxiety  4.  Bell's palsy    Subjective     HISTORY OF PRESENT ILLNESS:   Estefany Bryan returns today for follow-up.  At the end of last week she began having increasing redness and swelling of the breast around the mass.  We prescribed Keflex and the redness and swelling have improved.  She also says that she has less pain in her breast since starting chemotherapy treatment.  She has had ongoing muscle aches mainly in her thighs.  She says she feels like she did lunges.  She has been able to walk but is not doing any sort of exercise.  She notices increased fatigue.  She has had some constipation and thinks she is developing a hemorrhoid.    Past Medical History, Past Surgical History, Social History, Family History have been reviewed and are without significant changes except as mentioned.    Review of Systems   A comprehensive 14 point review of systems was performed and was negative except as mentioned.    Medications:  The current medication list was reviewed in the EMR    ALLERGIES:    Allergies   Allergen Reactions   • Chlorhexidine Other (See Comments)     Pt. developed redness and burning sensation after  "using.   • Codeine GI Intolerance       Objective      /80  Pulse 95  Temp 98 °F (36.7 °C) (Temporal Artery )   Resp 16  Ht 170.2 cm (67.01\")  Wt 79.6 kg (175 lb 6.4 oz)  SpO2 100%  BMI 27.46 kg/m2     Performance Status: 0    General: well appearing female in no acute distress  Neuro: alert and oriented  HEENT: sclera anicteric, oropharynx clear  Lymphatics: no cervical, supraclavicular adenopathy  Breast: In the right breast there is an upper outer quadrant mass which is approximately 12 cm in greatest dimension.  It is movable and firm and slightly tender to palpation.  There is mild erythema and warmth over the mass.  Cardiovascular: regular rate and rhythm, no murmurs  Lungs: clear to auscultation bilaterally  Abdomen: soft, nontender, nondistended.  No palpable organomegaly  Extremeties: no lower extremity edema  Skin: no rashes, lesions, bruising, or petechiae  Psych: mood and affect appropriate          Assessment/Plan   Estefany Bryan is a 42 y.o. year old female with [id number] triple negative breast cancer who comes in for her second round of chemotherapy.    We will continue with cycle 2 of treatment today.  She did have acute arthralgia after her first cycle and we discussed that this could happen again.  I recommended that she take 800 of ibuprofen or 1000 mg of Tylenol if needed.  If she's not able to manage her symptoms with this we can consider using tramadol.  She has had some myalgias which may be related to Neulasta.    She developed what is likely a cellulitis surrounding the breast mass.  This is improving on Keflex.  She will complete the course of antibiotics.  Overall I do think she has had some improvement in the breast mass after 1 round of chemotherapy.    I will see her back in 2 weeks.                Visit time was 25 minutes, greater than 50% spent in counseling      Winsome Romano MD  [facility]ology [facility] On[facility]    2/6/2018          CC:          "

## 2018-02-08 ENCOUNTER — TELEPHONE (OUTPATIENT)
Dept: ONCOLOGY | Facility: CLINIC | Age: 43
End: 2018-02-08

## 2018-02-08 NOTE — TELEPHONE ENCOUNTER
"Returned call to patient. She had cycle 2 of AC on Tuesday. Pain began Tuesday night. She hurts \"all over, even my teeth and scalp.\" She did have pain after cycle 1 but not to this extent. I told her this was likely caused by the neulasta that she got with her chemo. She is taking tylenol, motrin, claritin, and benadryl. I suggested instead of taking tylenol and motrin together she rotate them every 4 hours for overlapping coverage. MD on call, Dr. Martinez, said to keep the tylenol/ motrin going- no additional medications.       "

## 2018-02-08 NOTE — TELEPHONE ENCOUNTER
----- Message from Darcie Obando sent at 2/8/2018  2:51 PM EST -----  Regarding: [facility] - PAIN AND NAUSEA  Contact: [phone]  PATIENT HAD 2ND TREATMENT ON TUESDAY, AND SHE SAID SHE IS EXCRUCIATING PAIN FROM HER HIPS TO HER KNEES.     THE TYLENOL AND ZOFRAN AREN'T HELPING AT ALL.

## 2018-02-09 ENCOUNTER — TELEPHONE (OUTPATIENT)
Dept: ONCOLOGY | Facility: CLINIC | Age: 43
End: 2018-02-09

## 2018-02-09 RX ORDER — TRAMADOL HYDROCHLORIDE 50 MG/1
50 TABLET ORAL EVERY 6 HOURS PRN
Qty: 30 TABLET | Refills: 0 | OUTPATIENT
Start: 2018-02-09 | End: 2018-03-06 | Stop reason: SDUPTHER

## 2018-02-09 NOTE — TELEPHONE ENCOUNTER
Called to check on patient. No answer, left VM. Told her Dr. Romano will send in tramadol since she is not getting any relief with tylenol/ motrin. Asked her to call me back with any other issues.

## 2018-02-12 ENCOUNTER — OFFICE VISIT (OUTPATIENT)
Dept: PSYCHIATRY | Facility: CLINIC | Age: 43
End: 2018-02-12

## 2018-02-12 DIAGNOSIS — F33.1 MODERATE EPISODE OF RECURRENT MAJOR DEPRESSIVE DISORDER (HCC): ICD-10-CM

## 2018-02-12 DIAGNOSIS — F41.1 GENERALIZED ANXIETY DISORDER: Primary | ICD-10-CM

## 2018-02-12 DIAGNOSIS — F51.05 INSOMNIA DUE TO MENTAL CONDITION: ICD-10-CM

## 2018-02-12 PROCEDURE — 99213 OFFICE O/P EST LOW 20 MIN: CPT | Performed by: NURSE PRACTITIONER

## 2018-02-12 RX ORDER — AMITRIPTYLINE HYDROCHLORIDE 25 MG/1
25 TABLET, FILM COATED ORAL NIGHTLY
Qty: 30 TABLET | Refills: 0 | Status: SHIPPED | OUTPATIENT
Start: 2018-02-12 | End: 2018-03-20 | Stop reason: SDUPTHER

## 2018-02-12 RX ORDER — VENLAFAXINE HYDROCHLORIDE 75 MG/1
CAPSULE, EXTENDED RELEASE ORAL
Qty: 30 CAPSULE | Refills: 1 | Status: SHIPPED | OUTPATIENT
Start: 2018-02-12 | End: 2018-04-24 | Stop reason: SDUPTHER

## 2018-02-12 NOTE — PROGRESS NOTES
"  Maurice Bryan is a 42 y.o. female who is here today for medication management follow up.    Chief Complaint: Diagnoses and all orders for this visit:    Generalized anxiety disorder    Moderate episode of recurrent major depressive disorder    Insomnia due to mental condition    Other orders  -     venlafaxine XR (EFFEXOR-XR) 75 MG 24 hr capsule; Take one daily  -     amitriptyline (ELAVIL) 25 MG tablet; Take 1 tablet by mouth Every Night.        History of Present Illness Patient presents by herself for medication management follow-up.  She reports that her pain level is been reduced with tramadol after she gets NEOAdjuvant chemotherapy for breast cancer.  She reports great reduction in breast mass so feels encouraged by the response she is having from the chemotherapy.  Patient reports falling asleep but can't stay asleep and will be awake for hours in middle of night after going to the bathroom and having worrisome thoughts. Her work is being very supportive of her and she is going to start working remotely from home and can do that at anytime with goal of putting in 30 hours a week to keep her health insurance. Her parents switch places to help her, they live in [place], mom left today and dad is coming tomorrow. She states her children are doing well and she scores anxiety 6 with worry and depression difficult to say because her energy is so low that it makes it hard to be \"up\". She has less tearful moments, she wants to work and has motivation to do this. She enjoys her kids and watching movies with them. She tries to dress nicely and keeps up with hygiene \"but even a shower or brushing my teeth I have to sit down it exhausts me.  Her appetite is very selective \"cottage cheese and pears is my favorite now\". She is followed closely by her med onc Dr. Winsome Romano and really likes her, feels like she is getting good care.   Denies adverse effects from medications.   (Scales based on 0 - 10 with 10 " "being the worst)        The following portions of the patient's history were reviewed and updated as appropriate: allergies, current medications, past family history, past medical history, past social history, past surgical history and problem list.    Review of Systems denies fever, cough, s/s’s of infection, denies GI/ problems, but has major decrease in energy with fatigue, she is getting neoadjuvant chemo for large breast mass.     Objective   Physical Exam  Blood pressure 110/84, height 154.9 cm (61\"), weight 79.4 kg (175 lb).    Allergies   Allergen Reactions   • Chlorhexidine Other (See Comments)     Pt. developed redness and burning sensation after using.   • Codeine GI Intolerance       Current Medications:   Current Outpatient Prescriptions   Medication Sig Dispense Refill   • ALPRAZolam (XANAX) 1 MG tablet TK 1 T PO TID  2   • amitriptyline (ELAVIL) 25 MG tablet Take 1 tablet by mouth Every Night. 30 tablet 0   • cephalexin (KEFLEX) 500 MG capsule Take 1 capsule by mouth 3 (Three) Times a Day. For 10 days. Finish all of medication. 30 capsule 0   • Cholecalciferol (VITAMIN D) 2000 units capsule Take 2,000 Units by mouth Daily.     • lidocaine-prilocaine (EMLA) 2.5-2.5 % cream Apply  topically As Needed (45-60 minutes prior to port access.  Cover with saran/plastic wrap.). 30 g 3   • Loratadine (CLARITIN) 10 MG capsule Take 1 tablet by mouth Daily.     • magic mouthwash oral suspension Swish and Spit or swallow 5 to 10 ml (1 to 2 teaspoonfuls) by mouth 4 times daily as needed 240 mL 3   • magnesium oxide (MAG-OX) 400 MG tablet Take 400 mg by mouth Daily.     • metoprolol succinate XL (TOPROL-XL) 50 MG 24 hr tablet TK 1 T PO QD  5   • Multiple Vitamins-Minerals (MULTIVITAMIN ADULT PO) Take 1 tablet by mouth Daily.     • omeprazole (priLOSEC) 20 MG capsule Take 20 mg by mouth Daily.     • ondansetron (ZOFRAN) 8 MG tablet Take 1 tablet by mouth 3 (Three) Times a Day As Needed for Nausea or Vomiting. 30 " tablet 5   • prochlorperazine (COMPAZINE) 10 MG tablet Take 1 tablet by mouth Every 6 (Six) Hours As Needed for Nausea or Vomiting. 60 tablet 5   • traMADol (ULTRAM) 50 MG tablet Take 1 tablet by mouth Every 6 (Six) Hours As Needed for Moderate Pain . 30 tablet 0   • venlafaxine XR (EFFEXOR-XR) 75 MG 24 hr capsule Take one daily 30 capsule 1   • vitamin B-12 (CYANOCOBALAMIN) 1000 MCG tablet Take 1,000 mcg by mouth Daily.       Current Facility-Administered Medications   Medication Dose Route Frequency Provider Last Rate Last Dose   • lidocaine (XYLOCAINE) 1 % injection 5 mL  5 mL Infiltration Once Oanh Clark MD       • lidocaine-EPINEPHrine (XYLOCAINE W/EPI) 1 %-1:[id number] injection 10 mL  10 mL Infiltration Once Oanh Clark MD         Appearance: appropriate  Hygiene:   good  Cooperation:  Cooperative  Eye Contact:  Good  Psychomotor Behavior:  Appropriate  Mood:  Depression anxiety  Affect:  Appropriate  Hopelessness: Denies  Speech:  Normal  Thought Process:  Linear  Thought Content:  Normal  Suicidal:  None  Homicidal:  None  Hallucinations:  None  Delusion:  None  Memory:  Intact  Orientation:  Person, Place, Time and Situation  Reliability:  fair  Insight:  Fair  Judgement:  Fair  Impulse Control:  Fair  Estimated Intelligence: average range   Physical/Medical Issues:  No         Assessment/Plan   Diagnoses and all orders for this visit:    Generalized anxiety disorder    Moderate episode of recurrent major depressive disorder    Insomnia due to mental condition    Other orders  -     venlafaxine XR (EFFEXOR-XR) 75 MG 24 hr capsule; Take one daily  -     amitriptyline (ELAVIL) 25 MG tablet; Take 1 tablet by mouth Every Night.      Increase venlafaxine ask our to 75 mg by mouth every morning for depression and anxiety  Increase amitriptyline to 25 mg by mouth daily at bedtime for sleep.  She had been on 10 mg for migraines at at bedtime  Continue relaxation techniques and coping measures as  practiced.    We discussed risks, benefits, and side effects of the above medications and the patient was agreeable with the plan. Patient was educated on the importance of compliance with treatment and follow-up appointments.   Controlled substance prescriptions are either  printed off for patient, telephoned in  or ordered through [facility] by this provider  Instructed to call for questions or concerns and return early if necessary. Crisis plan reviewed including going to the Emergency department.    Return in about 4 weeks (around 3/12/2018).         Please note that portions of this note were completed with a voice recognition program.  Efforts were made to edit dictation, but occasionally words are mistranscribed.

## 2018-02-13 VITALS
DIASTOLIC BLOOD PRESSURE: 84 MMHG | WEIGHT: 175 LBS | HEIGHT: 61 IN | SYSTOLIC BLOOD PRESSURE: 110 MMHG | BODY MASS INDEX: 33.04 KG/M2

## 2018-02-20 ENCOUNTER — INFUSION (OUTPATIENT)
Dept: ONCOLOGY | Facility: HOSPITAL | Age: 43
End: 2018-02-20

## 2018-02-20 ENCOUNTER — OFFICE VISIT (OUTPATIENT)
Dept: ONCOLOGY | Facility: CLINIC | Age: 43
End: 2018-02-20

## 2018-02-20 VITALS
SYSTOLIC BLOOD PRESSURE: 107 MMHG | RESPIRATION RATE: 16 BRPM | BODY MASS INDEX: 27 KG/M2 | TEMPERATURE: 97.5 F | OXYGEN SATURATION: 98 % | HEIGHT: 67 IN | DIASTOLIC BLOOD PRESSURE: 70 MMHG | WEIGHT: 172 LBS | HEART RATE: 83 BPM

## 2018-02-20 DIAGNOSIS — C50.011 MALIGNANT NEOPLASM OF AREOLA OF RIGHT BREAST IN FEMALE, ESTROGEN RECEPTOR NEGATIVE (HCC): Primary | ICD-10-CM

## 2018-02-20 DIAGNOSIS — Z17.1 MALIGNANT NEOPLASM OF RIGHT BREAST IN FEMALE, ESTROGEN RECEPTOR NEGATIVE, UNSPECIFIED SITE OF BREAST (HCC): ICD-10-CM

## 2018-02-20 DIAGNOSIS — C50.911 MALIGNANT NEOPLASM OF RIGHT BREAST IN FEMALE, ESTROGEN RECEPTOR NEGATIVE, UNSPECIFIED SITE OF BREAST (HCC): ICD-10-CM

## 2018-02-20 DIAGNOSIS — Z17.1 MALIGNANT NEOPLASM OF AREOLA OF RIGHT BREAST IN FEMALE, ESTROGEN RECEPTOR NEGATIVE (HCC): ICD-10-CM

## 2018-02-20 DIAGNOSIS — C50.911 MALIGNANT NEOPLASM OF RIGHT BREAST IN FEMALE, ESTROGEN RECEPTOR NEGATIVE, UNSPECIFIED SITE OF BREAST (HCC): Primary | ICD-10-CM

## 2018-02-20 DIAGNOSIS — C50.011 MALIGNANT NEOPLASM OF AREOLA OF RIGHT BREAST IN FEMALE, ESTROGEN RECEPTOR NEGATIVE (HCC): ICD-10-CM

## 2018-02-20 DIAGNOSIS — Z17.1 MALIGNANT NEOPLASM OF AREOLA OF RIGHT BREAST IN FEMALE, ESTROGEN RECEPTOR NEGATIVE (HCC): Primary | ICD-10-CM

## 2018-02-20 DIAGNOSIS — Z17.1 MALIGNANT NEOPLASM OF RIGHT BREAST IN FEMALE, ESTROGEN RECEPTOR NEGATIVE, UNSPECIFIED SITE OF BREAST (HCC): Primary | ICD-10-CM

## 2018-02-20 LAB
ALBUMIN SERPL-MCNC: 4 G/DL (ref 3.2–4.8)
ALBUMIN/GLOB SERPL: 1.8 G/DL (ref 1.5–2.5)
ALP SERPL-CCNC: 96 U/L (ref 25–100)
ALT SERPL W P-5'-P-CCNC: 21 U/L (ref 7–40)
ANION GAP SERPL CALCULATED.3IONS-SCNC: 8 MMOL/L (ref 3–11)
AST SERPL-CCNC: 18 U/L (ref 0–33)
BILIRUB SERPL-MCNC: 0.2 MG/DL (ref 0.3–1.2)
BUN BLD-MCNC: 16 MG/DL (ref 9–23)
BUN/CREAT SERPL: 22.9 (ref 7–25)
CALCIUM SPEC-SCNC: 8.5 MG/DL (ref 8.7–10.4)
CHLORIDE SERPL-SCNC: 103 MMOL/L (ref 99–109)
CO2 SERPL-SCNC: 27 MMOL/L (ref 20–31)
CREAT BLD-MCNC: 0.7 MG/DL (ref 0.6–1.3)
ERYTHROCYTE [DISTWIDTH] IN BLOOD BY AUTOMATED COUNT: 13.4 % (ref 11.3–14.5)
GFR SERPL CREATININE-BSD FRML MDRD: 92 ML/MIN/1.73
GLOBULIN UR ELPH-MCNC: 2.2 GM/DL
GLUCOSE BLD-MCNC: 86 MG/DL (ref 70–100)
HCT VFR BLD AUTO: 33 % (ref 34.5–44)
HGB BLD-MCNC: 10.6 G/DL (ref 11.5–15.5)
LYMPHOCYTES # BLD AUTO: 1.5 10*3/MM3 (ref 0.6–4.8)
LYMPHOCYTES NFR BLD AUTO: 24.5 % (ref 24–44)
MCH RBC QN AUTO: 29.4 PG (ref 27–31)
MCHC RBC AUTO-ENTMCNC: 32.2 G/DL (ref 32–36)
MCV RBC AUTO: 91.3 FL (ref 80–99)
MONOCYTES # BLD AUTO: 0.4 10*3/MM3 (ref 0–1)
MONOCYTES NFR BLD AUTO: 6 % (ref 0–12)
NEUTROPHILS # BLD AUTO: 4.2 10*3/MM3 (ref 1.5–8.3)
NEUTROPHILS NFR BLD AUTO: 69.5 % (ref 41–71)
PLATELET # BLD AUTO: 236 10*3/MM3 (ref 150–450)
PMV BLD AUTO: 7.6 FL (ref 6–12)
POTASSIUM BLD-SCNC: 4.2 MMOL/L (ref 3.5–5.5)
PROT SERPL-MCNC: 6.2 G/DL (ref 5.7–8.2)
RBC # BLD AUTO: 3.61 10*6/MM3 (ref 3.89–5.14)
SODIUM BLD-SCNC: 138 MMOL/L (ref 132–146)
WBC NRBC COR # BLD: 6 10*3/MM3 (ref 3.5–10.8)

## 2018-02-20 PROCEDURE — 36415 COLL VENOUS BLD VENIPUNCTURE: CPT

## 2018-02-20 PROCEDURE — 25010000002 DEXAMETHASONE PER 1 MG: Performed by: INTERNAL MEDICINE

## 2018-02-20 PROCEDURE — 96367 TX/PROPH/DG ADDL SEQ IV INF: CPT

## 2018-02-20 PROCEDURE — 96375 TX/PRO/DX INJ NEW DRUG ADDON: CPT

## 2018-02-20 PROCEDURE — 99214 OFFICE O/P EST MOD 30 MIN: CPT | Performed by: INTERNAL MEDICINE

## 2018-02-20 PROCEDURE — 80053 COMPREHEN METABOLIC PANEL: CPT

## 2018-02-20 PROCEDURE — 96377 APPLICATON ON-BODY INJECTOR: CPT

## 2018-02-20 PROCEDURE — 96417 CHEMO IV INFUS EACH ADDL SEQ: CPT

## 2018-02-20 PROCEDURE — 96409 CHEMO IV PUSH SNGL DRUG: CPT

## 2018-02-20 PROCEDURE — 25010000002 CYCLOPHOSPHAMIDE PER 100 MG: Performed by: INTERNAL MEDICINE

## 2018-02-20 PROCEDURE — 96374 THER/PROPH/DIAG INJ IV PUSH: CPT

## 2018-02-20 PROCEDURE — 25010000002 HEPARIN FLUSH (PORCINE) 100 UNIT/ML SOLUTION: Performed by: INTERNAL MEDICINE

## 2018-02-20 PROCEDURE — 96365 THER/PROPH/DIAG IV INF INIT: CPT

## 2018-02-20 PROCEDURE — 25010000002 PEGFILGRASTIM 6 MG/0.6ML PREFILLED SYRINGE KIT: Performed by: INTERNAL MEDICINE

## 2018-02-20 PROCEDURE — 25010000002 PALONOSETRON PER 25 MCG: Performed by: INTERNAL MEDICINE

## 2018-02-20 PROCEDURE — 96368 THER/DIAG CONCURRENT INF: CPT

## 2018-02-20 PROCEDURE — 25010000002 DOXORUBICIN PER 10 MG: Performed by: INTERNAL MEDICINE

## 2018-02-20 PROCEDURE — 85025 COMPLETE CBC W/AUTO DIFF WBC: CPT

## 2018-02-20 PROCEDURE — 25010000002 FOSAPREPITANT PER 1 MG: Performed by: INTERNAL MEDICINE

## 2018-02-20 PROCEDURE — 96411 CHEMO IV PUSH ADDL DRUG: CPT

## 2018-02-20 PROCEDURE — 96413 CHEMO IV INFUSION 1 HR: CPT

## 2018-02-20 RX ORDER — SODIUM CHLORIDE 0.9 % (FLUSH) 0.9 %
10 SYRINGE (ML) INJECTION AS NEEDED
Status: CANCELLED | OUTPATIENT
Start: 2018-02-20

## 2018-02-20 RX ORDER — SODIUM CHLORIDE 0.9 % (FLUSH) 0.9 %
10 SYRINGE (ML) INJECTION AS NEEDED
Status: DISCONTINUED | OUTPATIENT
Start: 2018-02-20 | End: 2018-02-20 | Stop reason: HOSPADM

## 2018-02-20 RX ORDER — SODIUM CHLORIDE 9 MG/ML
250 INJECTION, SOLUTION INTRAVENOUS ONCE
Status: COMPLETED | OUTPATIENT
Start: 2018-02-20 | End: 2018-02-20

## 2018-02-20 RX ORDER — PALONOSETRON 0.05 MG/ML
0.25 INJECTION, SOLUTION INTRAVENOUS ONCE
Status: CANCELLED | OUTPATIENT
Start: 2018-02-20

## 2018-02-20 RX ORDER — PALONOSETRON 0.05 MG/ML
0.25 INJECTION, SOLUTION INTRAVENOUS ONCE
Status: COMPLETED | OUTPATIENT
Start: 2018-02-20 | End: 2018-02-20

## 2018-02-20 RX ORDER — DOXORUBICIN HYDROCHLORIDE 2 MG/ML
60 INJECTION, SOLUTION INTRAVENOUS ONCE
Status: COMPLETED | OUTPATIENT
Start: 2018-02-20 | End: 2018-02-20

## 2018-02-20 RX ORDER — DOXORUBICIN HYDROCHLORIDE 2 MG/ML
60 INJECTION, SOLUTION INTRAVENOUS ONCE
Status: CANCELLED | OUTPATIENT
Start: 2018-02-20

## 2018-02-20 RX ORDER — SODIUM CHLORIDE 9 MG/ML
250 INJECTION, SOLUTION INTRAVENOUS ONCE
Status: CANCELLED | OUTPATIENT
Start: 2018-02-20

## 2018-02-20 RX ADMIN — Medication 10 ML: at 11:01

## 2018-02-20 RX ADMIN — DEXAMETHASONE SODIUM PHOSPHATE 12 MG: 4 INJECTION, SOLUTION INTRAMUSCULAR; INTRAVENOUS at 09:32

## 2018-02-20 RX ADMIN — CYCLOPHOSPHAMIDE 1150 MG: 1 INJECTION, POWDER, FOR SOLUTION INTRAVENOUS; ORAL at 10:24

## 2018-02-20 RX ADMIN — SODIUM CHLORIDE 250 ML: 9 INJECTION, SOLUTION INTRAVENOUS at 09:29

## 2018-02-20 RX ADMIN — PALONOSETRON HYDROCHLORIDE 0.25 MG: 0.25 INJECTION INTRAVENOUS at 09:29

## 2018-02-20 RX ADMIN — PEGFILGRASTIM 6 MG: KIT SUBCUTANEOUS at 11:08

## 2018-02-20 RX ADMIN — SODIUM CHLORIDE 150 MG: 9 INJECTION, SOLUTION INTRAVENOUS at 09:32

## 2018-02-20 RX ADMIN — DOXORUBICIN HYDROCHLORIDE 116 MG: 2 INJECTION, SOLUTION INTRAVENOUS at 10:13

## 2018-02-20 RX ADMIN — HEPARIN 500 UNITS: 100 SYRINGE at 11:01

## 2018-02-20 NOTE — PROGRESS NOTES
"      PROBLEM LIST:  1. [id number] (stage IIIC) triple negative IDC of the right breast  A) patient presented with a enlarging mass in the right breast for 5-6 months associated with shooting pain.  Biopsy showed high grade invasive ductal carcinoma, ER negative, OR negative, HER-2 negative.  The mass on imaging measures approximately 9.7 cm.  Enlarged abnormal lymph nodes are present on imaging.  FNA of 1 axillary lymph node was negative for malignancy.  PET/CT on 1/12/18 showed large necrotic right breast mass, 2 pathologically enlarged and hypermetabolic intramammary nodes, and no evidence of distant disease.  Breast MRI on 1/17/18 showed evidence of pectoral muscle involvement, as well as several satellite lesions.  B) neoadjuvant ddAC followed by taxol started on 1/23/17.  2.  Hypertension  3.  Anxiety  4.  Bell's palsy    Subjective     HISTORY OF PRESENT ILLNESS:   Estefany Bryan returns today for follow-up.   She again had joint and muscle pain for the day of and day after treatment.  Tramadol did help with her pain.  She felt tired for a few days but has been working most of the time.  Overall she is doing ok.      Past Medical History, Past Surgical History, Social History, Family History have been reviewed and are without significant changes except as mentioned.    Review of Systems   A comprehensive 14 point review of systems was performed and was negative except as mentioned.    Medications:  The current medication list was reviewed in the EMR    ALLERGIES:    Allergies   Allergen Reactions   • Chlorhexidine Other (See Comments)     Pt. developed redness and burning sensation after using.   • Codeine GI Intolerance       Objective      /70  Pulse 83  Temp 97.5 °F (36.4 °C) (Temporal Artery )   Resp 16  Ht 170.2 cm (67.01\")  Wt 78 kg (172 lb)  SpO2 98%  BMI 26.93 kg/m2     Performance Status: 0    General: well appearing female in no acute distress  Neuro: alert and oriented  HEENT: sclera " anicteric, oropharynx clear  Lymphatics: no cervical, supraclavicular adenopathy. No palpable axillary adenopathy  Breast: In the right breast there is an upper outer quadrant mass which is approximately 7.5 cm in greatest dimension.  No erythema or warmth  Cardiovascular: regular rate and rhythm, no murmurs  Lungs: clear to auscultation bilaterally  Abdomen: soft, nontender, nondistended.  No palpable organomegaly  Extremeties: no lower extremity edema  Skin: no rashes, lesions, bruising, or petechiae  Psych: mood and affect appropriate          Assessment/Plan   Estefany Bryan is a 42 y.o. year old female with [id number] triple negative breast cancer who comes in for her third round of chemotherapy.  So far she is having an excellent clinical response with a decrease in her tumor size by almost 50% after 2 cycles.  She is having some myalgia and arthralgia as the side effects of her treatment but is able to manage this with tramadol.  Because of her significant body aches, we will plan to give weekly Taxol after completing Adriamycin and Cytoxan.    We will continue with cycle 3    I will see her back in 2 weeks.                Visit time was 25 minutes, greater than 50% spent in counseling      Winsome Romano MD  [facility] Hematology and Oncology    2/20/2018          CC:

## 2018-02-26 ENCOUNTER — DOCUMENTATION (OUTPATIENT)
Dept: OTHER | Facility: HOSPITAL | Age: 43
End: 2018-02-26

## 2018-02-26 NOTE — PROGRESS NOTES
I called the patients mobile number on her file and her mother answered - she said patient was asleep but doing well and working from home. I explained reason for my call was to see if patient was ready to reschedule her genetics appointment - the mother wanted to make sure that the blood draw for genetics could be done through patients port. I will let genetics know of this concern and also that the patients mother said that the patient would call to make genetic appointment when she was ready.

## 2018-03-06 ENCOUNTER — INFUSION (OUTPATIENT)
Dept: ONCOLOGY | Facility: HOSPITAL | Age: 43
End: 2018-03-06

## 2018-03-06 ENCOUNTER — OFFICE VISIT (OUTPATIENT)
Dept: ONCOLOGY | Facility: CLINIC | Age: 43
End: 2018-03-06

## 2018-03-06 VITALS
RESPIRATION RATE: 16 BRPM | DIASTOLIC BLOOD PRESSURE: 72 MMHG | SYSTOLIC BLOOD PRESSURE: 102 MMHG | HEIGHT: 67 IN | BODY MASS INDEX: 27.47 KG/M2 | HEART RATE: 88 BPM | WEIGHT: 175 LBS | TEMPERATURE: 97.3 F

## 2018-03-06 DIAGNOSIS — C50.011 MALIGNANT NEOPLASM OF AREOLA OF RIGHT BREAST IN FEMALE, ESTROGEN RECEPTOR NEGATIVE (HCC): ICD-10-CM

## 2018-03-06 DIAGNOSIS — Z17.1 MALIGNANT NEOPLASM OF RIGHT BREAST IN FEMALE, ESTROGEN RECEPTOR NEGATIVE, UNSPECIFIED SITE OF BREAST (HCC): ICD-10-CM

## 2018-03-06 DIAGNOSIS — Z17.1 MALIGNANT NEOPLASM OF AREOLA OF RIGHT BREAST IN FEMALE, ESTROGEN RECEPTOR NEGATIVE (HCC): ICD-10-CM

## 2018-03-06 DIAGNOSIS — C50.911 MALIGNANT NEOPLASM OF RIGHT BREAST IN FEMALE, ESTROGEN RECEPTOR NEGATIVE, UNSPECIFIED SITE OF BREAST (HCC): Primary | ICD-10-CM

## 2018-03-06 DIAGNOSIS — Z17.1 MALIGNANT NEOPLASM OF RIGHT BREAST IN FEMALE, ESTROGEN RECEPTOR NEGATIVE, UNSPECIFIED SITE OF BREAST (HCC): Primary | ICD-10-CM

## 2018-03-06 DIAGNOSIS — C50.011 MALIGNANT NEOPLASM OF AREOLA OF RIGHT BREAST IN FEMALE, ESTROGEN RECEPTOR NEGATIVE (HCC): Primary | ICD-10-CM

## 2018-03-06 DIAGNOSIS — Z17.1 MALIGNANT NEOPLASM OF AREOLA OF RIGHT BREAST IN FEMALE, ESTROGEN RECEPTOR NEGATIVE (HCC): Primary | ICD-10-CM

## 2018-03-06 DIAGNOSIS — C50.911 MALIGNANT NEOPLASM OF RIGHT BREAST IN FEMALE, ESTROGEN RECEPTOR NEGATIVE, UNSPECIFIED SITE OF BREAST (HCC): ICD-10-CM

## 2018-03-06 LAB
ALBUMIN SERPL-MCNC: 3.8 G/DL (ref 3.2–4.8)
ALBUMIN/GLOB SERPL: 1.6 G/DL (ref 1.5–2.5)
ALP SERPL-CCNC: 72 U/L (ref 25–100)
ALT SERPL W P-5'-P-CCNC: 17 U/L (ref 7–40)
ANION GAP SERPL CALCULATED.3IONS-SCNC: 9 MMOL/L (ref 3–11)
AST SERPL-CCNC: 17 U/L (ref 0–33)
BILIRUB SERPL-MCNC: 0.2 MG/DL (ref 0.3–1.2)
BUN BLD-MCNC: 14 MG/DL (ref 9–23)
BUN/CREAT SERPL: 17.5 (ref 7–25)
CALCIUM SPEC-SCNC: 8.6 MG/DL (ref 8.7–10.4)
CHLORIDE SERPL-SCNC: 104 MMOL/L (ref 99–109)
CO2 SERPL-SCNC: 25 MMOL/L (ref 20–31)
CREAT BLD-MCNC: 0.8 MG/DL (ref 0.6–1.3)
ERYTHROCYTE [DISTWIDTH] IN BLOOD BY AUTOMATED COUNT: 16.7 % (ref 11.3–14.5)
GFR SERPL CREATININE-BSD FRML MDRD: 79 ML/MIN/1.73
GLOBULIN UR ELPH-MCNC: 2.4 GM/DL
GLUCOSE BLD-MCNC: 81 MG/DL (ref 70–100)
HCT VFR BLD AUTO: 31.9 % (ref 34.5–44)
HGB BLD-MCNC: 10.1 G/DL (ref 11.5–15.5)
LYMPHOCYTES # BLD AUTO: 1.3 10*3/MM3 (ref 0.6–4.8)
LYMPHOCYTES NFR BLD AUTO: 25 % (ref 24–44)
MCH RBC QN AUTO: 29.2 PG (ref 27–31)
MCHC RBC AUTO-ENTMCNC: 31.6 G/DL (ref 32–36)
MCV RBC AUTO: 92.5 FL (ref 80–99)
MONOCYTES # BLD AUTO: 0.5 10*3/MM3 (ref 0–1)
MONOCYTES NFR BLD AUTO: 8.5 % (ref 0–12)
NEUTROPHILS # BLD AUTO: 3.5 10*3/MM3 (ref 1.5–8.3)
NEUTROPHILS NFR BLD AUTO: 66.5 % (ref 41–71)
PLATELET # BLD AUTO: 219 10*3/MM3 (ref 150–450)
PMV BLD AUTO: 7.2 FL (ref 6–12)
POTASSIUM BLD-SCNC: 4.3 MMOL/L (ref 3.5–5.5)
PROT SERPL-MCNC: 6.2 G/DL (ref 5.7–8.2)
RBC # BLD AUTO: 3.45 10*6/MM3 (ref 3.89–5.14)
SODIUM BLD-SCNC: 138 MMOL/L (ref 132–146)
WBC NRBC COR # BLD: 5.3 10*3/MM3 (ref 3.5–10.8)

## 2018-03-06 PROCEDURE — 25010000002 PEGFILGRASTIM 6 MG/0.6ML PREFILLED SYRINGE KIT: Performed by: INTERNAL MEDICINE

## 2018-03-06 PROCEDURE — 96367 TX/PROPH/DG ADDL SEQ IV INF: CPT

## 2018-03-06 PROCEDURE — 96411 CHEMO IV PUSH ADDL DRUG: CPT

## 2018-03-06 PROCEDURE — 25010000002 HEPARIN FLUSH (PORCINE) 100 UNIT/ML SOLUTION: Performed by: INTERNAL MEDICINE

## 2018-03-06 PROCEDURE — 96375 TX/PRO/DX INJ NEW DRUG ADDON: CPT

## 2018-03-06 PROCEDURE — 25010000002 DEXAMETHASONE PER 1 MG: Performed by: INTERNAL MEDICINE

## 2018-03-06 PROCEDURE — 25010000002 DOXORUBICIN PER 10 MG: Performed by: INTERNAL MEDICINE

## 2018-03-06 PROCEDURE — 25010000002 PALONOSETRON PER 25 MCG: Performed by: INTERNAL MEDICINE

## 2018-03-06 PROCEDURE — 85025 COMPLETE CBC W/AUTO DIFF WBC: CPT

## 2018-03-06 PROCEDURE — 80053 COMPREHEN METABOLIC PANEL: CPT

## 2018-03-06 PROCEDURE — 99214 OFFICE O/P EST MOD 30 MIN: CPT | Performed by: INTERNAL MEDICINE

## 2018-03-06 PROCEDURE — 25010000002 CYCLOPHOSPHAMIDE PER 100 MG: Performed by: INTERNAL MEDICINE

## 2018-03-06 PROCEDURE — 25010000002 FOSAPREPITANT PER 1 MG: Performed by: INTERNAL MEDICINE

## 2018-03-06 PROCEDURE — 96377 APPLICATON ON-BODY INJECTOR: CPT

## 2018-03-06 PROCEDURE — 96413 CHEMO IV INFUSION 1 HR: CPT

## 2018-03-06 RX ORDER — PALONOSETRON 0.05 MG/ML
0.25 INJECTION, SOLUTION INTRAVENOUS ONCE
Status: CANCELLED | OUTPATIENT
Start: 2018-03-06

## 2018-03-06 RX ORDER — PROMETHAZINE HYDROCHLORIDE 12.5 MG/1
TABLET ORAL
Qty: 30 TABLET | Refills: 1 | Status: SHIPPED | OUTPATIENT
Start: 2018-03-06 | End: 2019-08-30

## 2018-03-06 RX ORDER — PALONOSETRON 0.05 MG/ML
0.25 INJECTION, SOLUTION INTRAVENOUS ONCE
Status: COMPLETED | OUTPATIENT
Start: 2018-03-06 | End: 2018-03-06

## 2018-03-06 RX ORDER — DOXORUBICIN HYDROCHLORIDE 2 MG/ML
60 INJECTION, SOLUTION INTRAVENOUS ONCE
Status: COMPLETED | OUTPATIENT
Start: 2018-03-06 | End: 2018-03-06

## 2018-03-06 RX ORDER — TRAMADOL HYDROCHLORIDE 50 MG/1
50 TABLET ORAL EVERY 6 HOURS PRN
Qty: 20 TABLET | Refills: 0 | OUTPATIENT
Start: 2018-03-06 | End: 2018-03-23 | Stop reason: SDUPTHER

## 2018-03-06 RX ORDER — SODIUM CHLORIDE 0.9 % (FLUSH) 0.9 %
10 SYRINGE (ML) INJECTION AS NEEDED
Status: CANCELLED | OUTPATIENT
Start: 2018-03-06

## 2018-03-06 RX ORDER — DOXORUBICIN HYDROCHLORIDE 2 MG/ML
60 INJECTION, SOLUTION INTRAVENOUS ONCE
Status: CANCELLED | OUTPATIENT
Start: 2018-03-06

## 2018-03-06 RX ORDER — SODIUM CHLORIDE 9 MG/ML
250 INJECTION, SOLUTION INTRAVENOUS ONCE
Status: COMPLETED | OUTPATIENT
Start: 2018-03-06 | End: 2018-03-06

## 2018-03-06 RX ORDER — SODIUM CHLORIDE 9 MG/ML
250 INJECTION, SOLUTION INTRAVENOUS ONCE
Status: CANCELLED | OUTPATIENT
Start: 2018-03-06

## 2018-03-06 RX ADMIN — CYCLOPHOSPHAMIDE 1150 MG: 1 INJECTION, POWDER, FOR SOLUTION INTRAVENOUS; ORAL at 10:27

## 2018-03-06 RX ADMIN — SODIUM CHLORIDE 250 ML: 9 INJECTION, SOLUTION INTRAVENOUS at 09:27

## 2018-03-06 RX ADMIN — DOXORUBICIN HYDROCHLORIDE 116 MG: 2 INJECTION, SOLUTION INTRAVENOUS at 10:14

## 2018-03-06 RX ADMIN — PALONOSETRON HYDROCHLORIDE 0.25 MG: 0.25 INJECTION INTRAVENOUS at 09:27

## 2018-03-06 RX ADMIN — HEPARIN 500 UNITS: 100 SYRINGE at 11:00

## 2018-03-06 RX ADMIN — PEGFILGRASTIM 6 MG: KIT SUBCUTANEOUS at 11:01

## 2018-03-06 RX ADMIN — SODIUM CHLORIDE 150 MG: 9 INJECTION, SOLUTION INTRAVENOUS at 09:31

## 2018-03-06 RX ADMIN — DEXAMETHASONE SODIUM PHOSPHATE 12 MG: 4 INJECTION, SOLUTION INTRAMUSCULAR; INTRAVENOUS at 09:31

## 2018-03-06 NOTE — PROGRESS NOTES
"      PROBLEM LIST:  1. [id number] (stage IIIC) triple negative IDC of the right breast  A) patient presented with a enlarging mass in the right breast for 5-6 months associated with shooting pain.  Biopsy showed high grade invasive ductal carcinoma, ER negative, [place] negative, HER-2 negative.  The mass on imaging measures approximately 9.7 cm.  Enlarged abnormal lymph nodes are present on imaging.  FNA of 1 axillary lymph node was negative for malignancy.  PET/CT on 1/12/18 showed large necrotic right breast mass, 2 pathologically enlarged and hypermetabolic intramammary nodes, and no evidence of distant disease.  Breast MRI on 1/17/18 showed evidence of pectoral muscle involvement, as well as several satellite lesions.  B) neoadjuvant ddAC followed by taxol started on 1/23/17.  2.  Hypertension  3.  Anxiety  4.  Bell's palsy    Subjective     HISTORY OF PRESENT ILLNESS:   Estefany Bryan returns today for follow-up.   This past cycle was a little different for her.  She did not have the severe joint pain on the day of treatment.  However about 2 days after treatment she felt very nauseated and achy all over.  She spent about 2 days in bed and says she really couldn't move.  She did not vomit.  After that she gradually felt better each day and was able to return to normal activities.    Past Medical History, Past Surgical History, Social History, Family History have been reviewed and are without significant changes except as mentioned.    Review of Systems   A comprehensive 14 point review of systems was performed and was negative except as mentioned.    Medications:  The current medication list was reviewed in the EMR    ALLERGIES:    Allergies   Allergen Reactions   • Chlorhexidine Other (See Comments)     Pt. developed redness and burning sensation after using.   • Codeine GI Intolerance       Objective      /72 Comment: RUE  Pulse 88  Temp 97.3 °F (36.3 °C) (Temporal Artery )   Resp 16  Ht 170.2 cm (67\") "  Wt 79.4 kg (175 lb)  BMI 27.41 kg/m2     Performance Status: 0    General: well appearing female in no acute distress  Neuro: alert and oriented  HEENT: sclera anicteric, oropharynx clear  Lymphatics: no cervical, supraclavicular adenopathy. No palpable axillary adenopathy  Breast: In the right breast there is an upper outer quadrant mass which is approximately 5 cm in greatest dimension.  No erythema or warmth  Cardiovascular: regular rate and rhythm, no murmurs  Lungs: clear to auscultation bilaterally  Abdomen: soft, nontender, nondistended.  No palpable organomegaly  Extremeties: no lower extremity edema  Skin: no rashes, lesions, bruising, or petechiae  Psych: mood and affect appropriate          Assessment/Plan   Estefany Bryan is a 42 y.o. year old female with hV1H3N4 triple negative breast cancer who comes in for her 4th cycle of chemotherapy.  She had some more nausea and body aches with this treatment.  We will continue tramadol as needed for pain.  We will send in a prescription for Phenergan.  We discussed that she cannot take this at the same time as Compazine as they have similar mechanisms of action.  We will continue with cycle 4    I will see her back in 2 weeks to begin weekly taxol.  We discussed that Taxol can cause permanent neuropathy.  Hopefully she will have less nausea and fatigue.                Visit time was 25 minutes, greater than 50% spent in counseling      Winsome Romano MD  [facility] Hematology and Oncology    3/6/2018          CC:

## 2018-03-20 ENCOUNTER — EDUCATION (OUTPATIENT)
Dept: ONCOLOGY | Facility: HOSPITAL | Age: 43
End: 2018-03-20

## 2018-03-20 ENCOUNTER — INFUSION (OUTPATIENT)
Dept: ONCOLOGY | Facility: HOSPITAL | Age: 43
End: 2018-03-20

## 2018-03-20 ENCOUNTER — OFFICE VISIT (OUTPATIENT)
Dept: ONCOLOGY | Facility: CLINIC | Age: 43
End: 2018-03-20

## 2018-03-20 VITALS — HEART RATE: 78 BPM | DIASTOLIC BLOOD PRESSURE: 65 MMHG | SYSTOLIC BLOOD PRESSURE: 101 MMHG

## 2018-03-20 VITALS
RESPIRATION RATE: 16 BRPM | OXYGEN SATURATION: 99 % | WEIGHT: 172.4 LBS | HEIGHT: 67 IN | HEART RATE: 81 BPM | BODY MASS INDEX: 27.06 KG/M2 | DIASTOLIC BLOOD PRESSURE: 72 MMHG | SYSTOLIC BLOOD PRESSURE: 101 MMHG | TEMPERATURE: 97.7 F

## 2018-03-20 DIAGNOSIS — Z17.1 MALIGNANT NEOPLASM OF RIGHT BREAST IN FEMALE, ESTROGEN RECEPTOR NEGATIVE, UNSPECIFIED SITE OF BREAST (HCC): ICD-10-CM

## 2018-03-20 DIAGNOSIS — C50.911 MALIGNANT NEOPLASM OF RIGHT BREAST IN FEMALE, ESTROGEN RECEPTOR NEGATIVE, UNSPECIFIED SITE OF BREAST (HCC): Primary | ICD-10-CM

## 2018-03-20 DIAGNOSIS — C50.911 MALIGNANT NEOPLASM OF RIGHT BREAST IN FEMALE, ESTROGEN RECEPTOR NEGATIVE, UNSPECIFIED SITE OF BREAST (HCC): ICD-10-CM

## 2018-03-20 DIAGNOSIS — Z17.1 MALIGNANT NEOPLASM OF RIGHT BREAST IN FEMALE, ESTROGEN RECEPTOR NEGATIVE, UNSPECIFIED SITE OF BREAST (HCC): Primary | ICD-10-CM

## 2018-03-20 DIAGNOSIS — C50.011 MALIGNANT NEOPLASM OF AREOLA OF RIGHT BREAST IN FEMALE, ESTROGEN RECEPTOR NEGATIVE (HCC): Primary | ICD-10-CM

## 2018-03-20 DIAGNOSIS — Z17.1 MALIGNANT NEOPLASM OF AREOLA OF RIGHT BREAST IN FEMALE, ESTROGEN RECEPTOR NEGATIVE (HCC): ICD-10-CM

## 2018-03-20 DIAGNOSIS — C50.011 MALIGNANT NEOPLASM OF AREOLA OF RIGHT BREAST IN FEMALE, ESTROGEN RECEPTOR NEGATIVE (HCC): ICD-10-CM

## 2018-03-20 DIAGNOSIS — Z17.1 MALIGNANT NEOPLASM OF AREOLA OF RIGHT BREAST IN FEMALE, ESTROGEN RECEPTOR NEGATIVE (HCC): Primary | ICD-10-CM

## 2018-03-20 LAB
ALBUMIN SERPL-MCNC: 3.9 G/DL (ref 3.2–4.8)
ALBUMIN/GLOB SERPL: 1.8 G/DL (ref 1.5–2.5)
ALP SERPL-CCNC: 82 U/L (ref 25–100)
ALT SERPL W P-5'-P-CCNC: 17 U/L (ref 7–40)
ANION GAP SERPL CALCULATED.3IONS-SCNC: 10 MMOL/L (ref 3–11)
AST SERPL-CCNC: 17 U/L (ref 0–33)
BILIRUB SERPL-MCNC: 0.3 MG/DL (ref 0.3–1.2)
BUN BLD-MCNC: 14 MG/DL (ref 9–23)
BUN/CREAT SERPL: 20 (ref 7–25)
CALCIUM SPEC-SCNC: 8.6 MG/DL (ref 8.7–10.4)
CHLORIDE SERPL-SCNC: 102 MMOL/L (ref 99–109)
CO2 SERPL-SCNC: 27 MMOL/L (ref 20–31)
CREAT BLD-MCNC: 0.7 MG/DL (ref 0.6–1.3)
ERYTHROCYTE [DISTWIDTH] IN BLOOD BY AUTOMATED COUNT: 18.1 % (ref 11.3–14.5)
GFR SERPL CREATININE-BSD FRML MDRD: 92 ML/MIN/1.73
GLOBULIN UR ELPH-MCNC: 2.2 GM/DL
GLUCOSE BLD-MCNC: 84 MG/DL (ref 70–100)
HCT VFR BLD AUTO: 31.7 % (ref 34.5–44)
HGB BLD-MCNC: 10.3 G/DL (ref 11.5–15.5)
LYMPHOCYTES # BLD AUTO: 1.4 10*3/MM3 (ref 0.6–4.8)
LYMPHOCYTES NFR BLD AUTO: 24.4 % (ref 24–44)
MCH RBC QN AUTO: 30.2 PG (ref 27–31)
MCHC RBC AUTO-ENTMCNC: 32.6 G/DL (ref 32–36)
MCV RBC AUTO: 92.6 FL (ref 80–99)
MONOCYTES # BLD AUTO: 0.4 10*3/MM3 (ref 0–1)
MONOCYTES NFR BLD AUTO: 6.3 % (ref 0–12)
NEUTROPHILS # BLD AUTO: 3.9 10*3/MM3 (ref 1.5–8.3)
NEUTROPHILS NFR BLD AUTO: 69.3 % (ref 41–71)
PLATELET # BLD AUTO: 194 10*3/MM3 (ref 150–450)
PMV BLD AUTO: 6.8 FL (ref 6–12)
POTASSIUM BLD-SCNC: 4.2 MMOL/L (ref 3.5–5.5)
PROT SERPL-MCNC: 6.1 G/DL (ref 5.7–8.2)
RBC # BLD AUTO: 3.42 10*6/MM3 (ref 3.89–5.14)
SODIUM BLD-SCNC: 139 MMOL/L (ref 132–146)
WBC NRBC COR # BLD: 5.6 10*3/MM3 (ref 3.5–10.8)

## 2018-03-20 PROCEDURE — 96413 CHEMO IV INFUSION 1 HR: CPT

## 2018-03-20 PROCEDURE — 25010000002 PACLITAXEL PER 30 MG: Performed by: INTERNAL MEDICINE

## 2018-03-20 PROCEDURE — 96375 TX/PRO/DX INJ NEW DRUG ADDON: CPT

## 2018-03-20 PROCEDURE — 25010000002 HEPARIN FLUSH (PORCINE) 100 UNIT/ML SOLUTION: Performed by: INTERNAL MEDICINE

## 2018-03-20 PROCEDURE — 80053 COMPREHEN METABOLIC PANEL: CPT

## 2018-03-20 PROCEDURE — 96376 TX/PRO/DX INJ SAME DRUG ADON: CPT

## 2018-03-20 PROCEDURE — 25010000002 DEXAMETHASONE PER 1 MG: Performed by: INTERNAL MEDICINE

## 2018-03-20 PROCEDURE — 99214 OFFICE O/P EST MOD 30 MIN: CPT | Performed by: INTERNAL MEDICINE

## 2018-03-20 PROCEDURE — 25010000002 DIPHENHYDRAMINE PER 50 MG: Performed by: INTERNAL MEDICINE

## 2018-03-20 PROCEDURE — 85025 COMPLETE CBC W/AUTO DIFF WBC: CPT

## 2018-03-20 RX ORDER — SODIUM CHLORIDE 9 MG/ML
250 INJECTION, SOLUTION INTRAVENOUS ONCE
Status: CANCELLED | OUTPATIENT
Start: 2018-03-20

## 2018-03-20 RX ORDER — FAMOTIDINE 10 MG/ML
20 INJECTION, SOLUTION INTRAVENOUS ONCE
Status: COMPLETED | OUTPATIENT
Start: 2018-03-20 | End: 2018-03-20

## 2018-03-20 RX ORDER — SODIUM CHLORIDE 9 MG/ML
250 INJECTION, SOLUTION INTRAVENOUS ONCE
Status: CANCELLED | OUTPATIENT
Start: 2018-04-03

## 2018-03-20 RX ORDER — SODIUM CHLORIDE 0.9 % (FLUSH) 0.9 %
10 SYRINGE (ML) INJECTION AS NEEDED
Status: DISCONTINUED | OUTPATIENT
Start: 2018-03-20 | End: 2018-03-20 | Stop reason: HOSPADM

## 2018-03-20 RX ORDER — FAMOTIDINE 10 MG/ML
20 INJECTION, SOLUTION INTRAVENOUS ONCE
Status: CANCELLED | OUTPATIENT
Start: 2018-03-27

## 2018-03-20 RX ORDER — FAMOTIDINE 10 MG/ML
20 INJECTION, SOLUTION INTRAVENOUS ONCE
Status: CANCELLED | OUTPATIENT
Start: 2018-04-03

## 2018-03-20 RX ORDER — FAMOTIDINE 10 MG/ML
20 INJECTION, SOLUTION INTRAVENOUS ONCE
Status: CANCELLED | OUTPATIENT
Start: 2018-03-20

## 2018-03-20 RX ORDER — DIPHENHYDRAMINE HYDROCHLORIDE 50 MG/ML
25 INJECTION INTRAMUSCULAR; INTRAVENOUS ONCE
Status: DISCONTINUED | OUTPATIENT
Start: 2018-03-20 | End: 2018-03-20 | Stop reason: HOSPADM

## 2018-03-20 RX ORDER — SODIUM CHLORIDE 9 MG/ML
250 INJECTION, SOLUTION INTRAVENOUS ONCE
Status: CANCELLED | OUTPATIENT
Start: 2018-03-27

## 2018-03-20 RX ORDER — SODIUM CHLORIDE 0.9 % (FLUSH) 0.9 %
10 SYRINGE (ML) INJECTION AS NEEDED
Status: CANCELLED | OUTPATIENT
Start: 2018-03-20

## 2018-03-20 RX ORDER — AMITRIPTYLINE HYDROCHLORIDE 25 MG/1
25 TABLET, FILM COATED ORAL NIGHTLY
Qty: 30 TABLET | Refills: 0 | Status: SHIPPED | OUTPATIENT
Start: 2018-03-20 | End: 2018-04-24 | Stop reason: SDUPTHER

## 2018-03-20 RX ORDER — SODIUM CHLORIDE 9 MG/ML
250 INJECTION, SOLUTION INTRAVENOUS ONCE
Status: COMPLETED | OUTPATIENT
Start: 2018-03-20 | End: 2018-03-20

## 2018-03-20 RX ADMIN — FAMOTIDINE 20 MG: 10 INJECTION, SOLUTION INTRAVENOUS at 09:53

## 2018-03-20 RX ADMIN — SODIUM CHLORIDE 250 ML: 9 INJECTION, SOLUTION INTRAVENOUS at 09:53

## 2018-03-20 RX ADMIN — PACLITAXEL 150 MG: 6 INJECTION, SOLUTION INTRAVENOUS at 10:35

## 2018-03-20 RX ADMIN — DEXAMETHASONE SODIUM PHOSPHATE 12 MG: 4 INJECTION, SOLUTION INTRAMUSCULAR; INTRAVENOUS at 09:53

## 2018-03-20 RX ADMIN — DIPHENHYDRAMINE HYDROCHLORIDE 50 MG: 50 INJECTION INTRAMUSCULAR; INTRAVENOUS at 09:53

## 2018-03-20 RX ADMIN — HEPARIN 500 UNITS: 100 SYRINGE at 12:37

## 2018-03-20 RX ADMIN — Medication 10 ML: at 12:37

## 2018-03-20 NOTE — PLAN OF CARE
Outpatient Infusion • [address] • [address] • [place] [id number] • [phone]      CHEMOTHERAPY EDUCATION SHEET    NAME:  Estefany Bryan      : 1975           DATE: 18    Booklets Given: Chemotherapy and You []  Eating Hints []    Sexuality/Fertility Books []     Chemotherapy/Biotherapy Education Sheets: (list all that apply)  Paclitaxel                                                                                                                                                                Chemotherapy Regimen:  Paclitaxel every week x12    TOPICS EDUCATION PROVIDED EDUCATION REINFORCED COMMENTS   ANEMIA:  role of RBC, cause, s/s, ways to manage, role of transfusion [] []    THROMBOCYTOPENIA:  role of platelet, cause, s/s, ways to prevent bleeding, things to avoid, when to seek help [] []    NEUTROPENIA:  role of WBC, cause, infection precautions, s/s of infection, when to call MD [] [x] Counseled patient on the increased risk for infection with cytotoxic chemotherapy agents. Counseled patient to contact the physician with a fever >100.4.   NUTRITION & APPETITE CHANGES:  importance of maintaining healthy diet & weight, ways to manage to improve intake, dietary consult, exercise regimen [] [x] Counseled patient on the likelihood of GI upset.   DIARRHEA:  causes, s/s of dehydration, ways to manage, dietary changes, when to call MD [] []    CONSTIPATION:  causes, ways to manage, dietary changes, when to call MD [] []    NAUSEA & VOMITING:  cause, use of antiemetics, dietary changes, when to call MD [] [x] Counseled patient on the low likelihood of nausea and vomiting. Explained to patient the role of the antiemetic agents both at time of infusion and the prn agent at home.    MOUTH SORES:  causes, oral care, ways to manage [] [x] Patient currently is experiencing mucositis and requested a new prescription for Magic Mouthwash. Spoke to Felipa regarding new order for mouthwash.     ALOPECIA:  cause, ways to manage, resources [] [x] Counseled patient on the likelihood of hair thinning with Taxol.    INFERTILITY & SEXUALITY:  causes, fertility preservation options, sexuality changes, ways to manage, importance of birth control [] [x] Counseled patient on safe sexual practices for at least the first 48 hours after receiving chemotherapy.    NERVOUS SYSTEM CHANGES:  causes, s/s, neuropathies, cognitive changes, ways to manage [] [x] Counseled patient on the likelihood of peripheral neuropathy with this agent, and recommended that if it begins to decrease quality of life to contact MD.   PAIN:  causes, ways to manage [] [x] Counseled patient on the likelihood of arthralgias and myalgias with agent. Patient is currently receiving tramadol for pain. ??   SKIN & NAIL CHANGES:  cause, s/s, ways to manage [] []    ORGAN TOXICITIES:  cause, s/s, need for diagnostic tests, labs, when to notify MD [] []    SURVIVORSHIP:  distress, distress assessment, secondary malignancies, early/late effects, follow-up, social issues, social support [] []    HOME CARE:  use of spill kits, storing of PO chemo, how to manage bodily fluids [] [x] Counseled patient on the proper disposal of soiled sheets and cleaning of toilets following diarrhea symptoms.    MISCELLANEOUS:  drug interactions, administration, vesicant, et [] [] Counseled patients on the administration schedule of new agent.     Referrals:        Notes: Patient had questions about sharing a bathroom with her 15 year old child. She had previously been using a separate bathroom. I advised the patient that though not necessary, if it is not a strain on quality of life, it would be a reasonable precaution to continue. Told patient that I would be at the clinic every week when they are there, should they have any questions.     Thanks,  Ricky Veliz, PharmD Candidate  Ext. [phone]

## 2018-03-20 NOTE — PROGRESS NOTES
PROBLEM LIST:  1. [id number] (stage IIIC) triple negative IDC of the right breast  A) patient presented with a enlarging mass in the right breast for 5-6 months associated with shooting pain.  Biopsy showed high grade invasive ductal carcinoma, ER negative, [place] negative, HER-2 negative.  The mass on imaging measures approximately 9.7 cm.  Enlarged abnormal lymph nodes are present on imaging.  FNA of 1 axillary lymph node was negative for malignancy.  PET/CT on 1/12/18 showed large necrotic right breast mass, 2 pathologically enlarged and hypermetabolic intramammary nodes, and no evidence of distant disease.  Breast MRI on 1/17/18 showed evidence of pectoral muscle involvement, as well as several satellite lesions.  B) neoadjuvant ddAC followed by taxol started on 1/23/17.  2.  Hypertension  3.  Anxiety  4.  Bell's palsy    Subjective     HISTORY OF PRESENT ILLNESS:   Estefany Bryan returns today for follow-up.   She says this last cycle was pretty rough.  She felt very fatigued.  She tried Phenergan but it actually seemed to make her nausea worse.  The Zofran helps the most but she is only able to fill 15 pills at a time.  She has some mouth sores that are still resolving on the side of her tongue.  She also has had some itching around her fingers and on her toes with some slightly raised lesions.    Past Medical History, Past Surgical History, Social History, Family History have been reviewed and are without significant changes except as mentioned.    Review of Systems   A comprehensive 14 point review of systems was performed and was negative except as mentioned.    Medications:  The current medication list was reviewed in the EMR    ALLERGIES:    Allergies   Allergen Reactions   • Chlorhexidine Other (See Comments)     Pt. developed redness and burning sensation after using.   • Codeine GI Intolerance       Objective      /72   Pulse 81   Temp 97.7 °F (36.5 °C) (Temporal Artery )   Resp 16   Ht  "170.2 cm (67.01\")   Wt 78.2 kg (172 lb 6.4 oz)   SpO2 99%   BMI 27.00 kg/m²      Performance Status: 0    General: well appearing female in no acute distress  Neuro: alert and oriented  HEENT: sclera anicteric, oropharynx clear  Lymphatics: no cervical, supraclavicular adenopathy. No palpable axillary adenopathy  Breast: In the right breast there is an upper outer quadrant mass which is approximately 4 cm in greatest dimension.  No erythema or warmth  Cardiovascular: regular rate and rhythm, no murmurs  Lungs: clear to auscultation bilaterally  Abdomen: soft, nontender, nondistended.  No palpable organomegaly  Extremeties: no lower extremity edema  Skin: no rashes, lesions, bruising, or petechiae  Psych: mood and affect appropriate          Assessment/Plan   Estefany Bryan is a 42 y.o. year old female with [id number] triple negative breast cancer who comes in to begin weekly taxol.  She has had some fatigue, myalgias, and nausea.  We discussed the Taxol has a higher risk of infusion reactions, and neuropathy can develop with cumulative doses.  There is also a risk of myalgias with Taxol, but hopefully this will be less severe with the weekly dosing.  She will let us know if she needs a refill on her tramadol.  We will work on trying to get a PA for getting more doses of Zofran.  For the itchy rash on her fingers and toes and recommended Benadryl cream, and to let us know if this gets worse.    She will follow-up in 3 weeks.                Visit time was 25 minutes, greater than 50% spent in counseling      Winsome Romano MD  [facility] Hematology and Oncology    3/20/2018          CC:          "

## 2018-03-23 ENCOUNTER — TELEPHONE (OUTPATIENT)
Dept: ONCOLOGY | Facility: CLINIC | Age: 43
End: 2018-03-23

## 2018-03-23 DIAGNOSIS — Z17.1 MALIGNANT NEOPLASM OF RIGHT BREAST IN FEMALE, ESTROGEN RECEPTOR NEGATIVE, UNSPECIFIED SITE OF BREAST (HCC): ICD-10-CM

## 2018-03-23 DIAGNOSIS — C50.911 MALIGNANT NEOPLASM OF RIGHT BREAST IN FEMALE, ESTROGEN RECEPTOR NEGATIVE, UNSPECIFIED SITE OF BREAST (HCC): ICD-10-CM

## 2018-03-23 RX ORDER — ONDANSETRON HYDROCHLORIDE 8 MG/1
8 TABLET, FILM COATED ORAL 3 TIMES DAILY PRN
Qty: 60 TABLET | Refills: 5 | Status: SHIPPED | OUTPATIENT
Start: 2018-03-23 | End: 2018-06-20

## 2018-03-23 RX ORDER — TRAMADOL HYDROCHLORIDE 50 MG/1
50 TABLET ORAL EVERY 6 HOURS PRN
Qty: 20 TABLET | Refills: 0 | OUTPATIENT
Start: 2018-03-23 | End: 2018-04-10 | Stop reason: SDUPTHER

## 2018-03-23 RX ORDER — ONDANSETRON HYDROCHLORIDE 8 MG/1
8 TABLET, FILM COATED ORAL 3 TIMES DAILY PRN
Qty: 50 TABLET | Refills: 5 | Status: SHIPPED | OUTPATIENT
Start: 2018-03-23 | End: 2018-03-23 | Stop reason: SDUPTHER

## 2018-03-23 NOTE — TELEPHONE ENCOUNTER
Called insurance company. Modified PA to ask for 60 tablets of zofran for a 30 day period. Answered required questions, and the case went into review.     I was told I can call the phone number [phone] and provide case ID [id number] to check on the status of this PA 24 hours/7days a week. At this time it is still in review. I called the patient to let her know she can call tomorrow and check. I recommended that if by Saturday evening it is either still in process or has been denied, go ahead and  her 30 tablet Rx from the pharmacy on Sunday. I will either continue checking on Monday or file appeal if it was denied.     Total time spent on the phone with insurance co trying to get this done: 90 minutes.

## 2018-03-23 NOTE — TELEPHONE ENCOUNTER
On Tuesday I submitted a PA online for quantity over ride on her zofran. Patient was previously only covered for 15 tablets in a 20 day period. PA approved for 30 tablets in a 30 day period.     I called patient and asked if this would be enough. She said she does not like taking the Compazine and takes 1-2 Zofran a day. They work very well at controlling N/V.     Per pharmacist at [facility], patient will be eligible to  30 tablets for 30 days on 3/25. I told her I was going to try and get a hold of someone at [facility]/ on the phone to modify that authorization to 60 tablets in a 30 day period so she doesn't have to worry about running out. I did tell her sometimes it is difficult to get in touch with people at insurance companies and it may be Monday before someone returns my call. If she is able to get by on the zofran she has left, try not to fill the 30 for 30Rx on Sunday because even if I get 60 authorized on Monday, she would then have to wait 30 days to fill the new one.     Told her I would call her before the end of the day and let her know whether I was able to get in touch with someone.       Patient is almost out of her pain medications. Tramadol refill called into pharmacy.

## 2018-03-26 ENCOUNTER — TELEPHONE (OUTPATIENT)
Dept: ONCOLOGY | Facility: CLINIC | Age: 43
End: 2018-03-26

## 2018-03-26 RX ORDER — ONDANSETRON HYDROCHLORIDE 8 MG/1
8 TABLET, FILM COATED ORAL EVERY 8 HOURS PRN
Qty: 90 TABLET | Refills: 5 | Status: SHIPPED | OUTPATIENT
Start: 2018-03-26 | End: 2019-08-30

## 2018-03-26 NOTE — TELEPHONE ENCOUNTER
----- Message from Darcie Obando sent at 3/26/2018  1:17 PM EDT -----  Regarding: [facility] - RX ZOFRAN STATUS   Contact: [phone]  PATIENT CALLED WANTING TO CHECK THE STATUS ON THE ZOFRAN. SHE CALLED THE INSURANCE ON Saturday AND THEY SAID IT WAS STILL IN REVIEW AS OF THEN.

## 2018-03-26 NOTE — TELEPHONE ENCOUNTER
Patient called back and requested to pay $26 for the 90 tablets. [facility] in [place] will honor this Good Rx coupon. I sent Rx there and faxed them patients insurance card/ facesheet, and called them with the coupon info. Coupon was faxed as well.

## 2018-03-26 NOTE — TELEPHONE ENCOUNTER
Returned call to patient at number provided. Left . PA for quantity 60 was denied. Let her know I was working on appeal, but if she was interested, [facility] has a coupon for this exact Rx as low as $18 or she can get #90 for $25.     Asked that she call me back and let me know she got the message, and if she wanted me to get started on the [facility] coupon.

## 2018-03-27 ENCOUNTER — INFUSION (OUTPATIENT)
Dept: ONCOLOGY | Facility: HOSPITAL | Age: 43
End: 2018-03-27

## 2018-03-27 VITALS
TEMPERATURE: 98.2 F | HEART RATE: 84 BPM | WEIGHT: 178 LBS | RESPIRATION RATE: 20 BRPM | SYSTOLIC BLOOD PRESSURE: 118 MMHG | DIASTOLIC BLOOD PRESSURE: 78 MMHG | BODY MASS INDEX: 27.94 KG/M2 | HEIGHT: 67 IN

## 2018-03-27 DIAGNOSIS — Z17.1 MALIGNANT NEOPLASM OF RIGHT BREAST IN FEMALE, ESTROGEN RECEPTOR NEGATIVE, UNSPECIFIED SITE OF BREAST (HCC): ICD-10-CM

## 2018-03-27 DIAGNOSIS — C50.011 MALIGNANT NEOPLASM OF AREOLA OF RIGHT BREAST IN FEMALE, ESTROGEN RECEPTOR NEGATIVE (HCC): Primary | ICD-10-CM

## 2018-03-27 DIAGNOSIS — Z17.1 MALIGNANT NEOPLASM OF AREOLA OF RIGHT BREAST IN FEMALE, ESTROGEN RECEPTOR NEGATIVE (HCC): Primary | ICD-10-CM

## 2018-03-27 DIAGNOSIS — C50.911 MALIGNANT NEOPLASM OF RIGHT BREAST IN FEMALE, ESTROGEN RECEPTOR NEGATIVE, UNSPECIFIED SITE OF BREAST (HCC): ICD-10-CM

## 2018-03-27 LAB
ERYTHROCYTE [DISTWIDTH] IN BLOOD BY AUTOMATED COUNT: 17.9 % (ref 11.3–14.5)
HCT VFR BLD AUTO: 28.7 % (ref 34.5–44)
HGB BLD-MCNC: 9.6 G/DL (ref 11.5–15.5)
LYMPHOCYTES # BLD AUTO: 1.1 10*3/MM3 (ref 0.6–4.8)
LYMPHOCYTES NFR BLD AUTO: 23.9 % (ref 24–44)
MCH RBC QN AUTO: 30.8 PG (ref 27–31)
MCHC RBC AUTO-ENTMCNC: 33.5 G/DL (ref 32–36)
MCV RBC AUTO: 92.1 FL (ref 80–99)
MONOCYTES # BLD AUTO: 0.3 10*3/MM3 (ref 0–1)
MONOCYTES NFR BLD AUTO: 7.2 % (ref 0–12)
NEUTROPHILS # BLD AUTO: 3.2 10*3/MM3 (ref 1.5–8.3)
NEUTROPHILS NFR BLD AUTO: 68.9 % (ref 41–71)
PLATELET # BLD AUTO: 290 10*3/MM3 (ref 150–450)
PMV BLD AUTO: 7 FL (ref 6–12)
RBC # BLD AUTO: 3.11 10*6/MM3 (ref 3.89–5.14)
WBC NRBC COR # BLD: 4.6 10*3/MM3 (ref 3.5–10.8)

## 2018-03-27 PROCEDURE — 85025 COMPLETE CBC W/AUTO DIFF WBC: CPT

## 2018-03-27 PROCEDURE — 96375 TX/PRO/DX INJ NEW DRUG ADDON: CPT

## 2018-03-27 PROCEDURE — 25010000002 DEXAMETHASONE PER 1 MG: Performed by: INTERNAL MEDICINE

## 2018-03-27 PROCEDURE — 25010000002 HEPARIN FLUSH (PORCINE) 100 UNIT/ML SOLUTION: Performed by: INTERNAL MEDICINE

## 2018-03-27 PROCEDURE — 25010000002 PACLITAXEL PER 30 MG: Performed by: INTERNAL MEDICINE

## 2018-03-27 PROCEDURE — 96413 CHEMO IV INFUSION 1 HR: CPT

## 2018-03-27 PROCEDURE — 25010000002 DIPHENHYDRAMINE PER 50 MG: Performed by: INTERNAL MEDICINE

## 2018-03-27 RX ORDER — FAMOTIDINE 10 MG/ML
20 INJECTION, SOLUTION INTRAVENOUS ONCE
Status: COMPLETED | OUTPATIENT
Start: 2018-03-27 | End: 2018-03-27

## 2018-03-27 RX ORDER — SODIUM CHLORIDE 0.9 % (FLUSH) 0.9 %
10 SYRINGE (ML) INJECTION AS NEEDED
Status: CANCELLED | OUTPATIENT
Start: 2018-03-27

## 2018-03-27 RX ORDER — SODIUM CHLORIDE 9 MG/ML
250 INJECTION, SOLUTION INTRAVENOUS ONCE
Status: COMPLETED | OUTPATIENT
Start: 2018-03-27 | End: 2018-03-27

## 2018-03-27 RX ADMIN — DEXAMETHASONE SODIUM PHOSPHATE 12 MG: 4 INJECTION, SOLUTION INTRAMUSCULAR; INTRAVENOUS at 09:33

## 2018-03-27 RX ADMIN — SODIUM CHLORIDE 250 ML: 9 INJECTION, SOLUTION INTRAVENOUS at 09:33

## 2018-03-27 RX ADMIN — PACLITAXEL 150 MG: 6 INJECTION, SOLUTION INTRAVENOUS at 10:25

## 2018-03-27 RX ADMIN — DIPHENHYDRAMINE HYDROCHLORIDE 25 MG: 50 INJECTION INTRAMUSCULAR; INTRAVENOUS at 09:33

## 2018-03-27 RX ADMIN — FAMOTIDINE 20 MG: 10 INJECTION INTRAVENOUS at 09:33

## 2018-03-27 RX ADMIN — HEPARIN 500 UNITS: 100 SYRINGE at 11:31

## 2018-04-03 ENCOUNTER — INFUSION (OUTPATIENT)
Dept: ONCOLOGY | Facility: HOSPITAL | Age: 43
End: 2018-04-03

## 2018-04-03 VITALS
RESPIRATION RATE: 16 BRPM | TEMPERATURE: 98.2 F | HEART RATE: 80 BPM | SYSTOLIC BLOOD PRESSURE: 112 MMHG | WEIGHT: 177 LBS | BODY MASS INDEX: 27.78 KG/M2 | DIASTOLIC BLOOD PRESSURE: 74 MMHG | HEIGHT: 67 IN

## 2018-04-03 DIAGNOSIS — Z17.1 MALIGNANT NEOPLASM OF AREOLA OF RIGHT BREAST IN FEMALE, ESTROGEN RECEPTOR NEGATIVE (HCC): Primary | ICD-10-CM

## 2018-04-03 DIAGNOSIS — C50.911 MALIGNANT NEOPLASM OF RIGHT BREAST IN FEMALE, ESTROGEN RECEPTOR NEGATIVE, UNSPECIFIED SITE OF BREAST (HCC): ICD-10-CM

## 2018-04-03 DIAGNOSIS — C50.011 MALIGNANT NEOPLASM OF AREOLA OF RIGHT BREAST IN FEMALE, ESTROGEN RECEPTOR NEGATIVE (HCC): Primary | ICD-10-CM

## 2018-04-03 DIAGNOSIS — Z17.1 MALIGNANT NEOPLASM OF RIGHT BREAST IN FEMALE, ESTROGEN RECEPTOR NEGATIVE, UNSPECIFIED SITE OF BREAST (HCC): ICD-10-CM

## 2018-04-03 LAB
ALBUMIN SERPL-MCNC: 3.7 G/DL (ref 3.2–4.8)
ALBUMIN/GLOB SERPL: 1.8 G/DL (ref 1.5–2.5)
ALP SERPL-CCNC: 84 U/L (ref 25–100)
ALT SERPL W P-5'-P-CCNC: 178 U/L (ref 7–40)
ANION GAP SERPL CALCULATED.3IONS-SCNC: 12 MMOL/L (ref 3–11)
AST SERPL-CCNC: 104 U/L (ref 0–33)
BILIRUB SERPL-MCNC: 0.2 MG/DL (ref 0.3–1.2)
BUN BLD-MCNC: 12 MG/DL (ref 9–23)
BUN/CREAT SERPL: 17.1 (ref 7–25)
CALCIUM SPEC-SCNC: 9.1 MG/DL (ref 8.7–10.4)
CHLORIDE SERPL-SCNC: 102 MMOL/L (ref 99–109)
CO2 SERPL-SCNC: 27 MMOL/L (ref 20–31)
CREAT BLD-MCNC: 0.7 MG/DL (ref 0.6–1.3)
ERYTHROCYTE [DISTWIDTH] IN BLOOD BY AUTOMATED COUNT: 18.5 % (ref 11.3–14.5)
GFR SERPL CREATININE-BSD FRML MDRD: 92 ML/MIN/1.73
GLOBULIN UR ELPH-MCNC: 2.1 GM/DL
GLUCOSE BLD-MCNC: 103 MG/DL (ref 70–100)
HCT VFR BLD AUTO: 29.1 % (ref 34.5–44)
HGB BLD-MCNC: 9.6 G/DL (ref 11.5–15.5)
LYMPHOCYTES # BLD AUTO: 1.4 10*3/MM3 (ref 0.6–4.8)
LYMPHOCYTES NFR BLD AUTO: 31 % (ref 24–44)
MCH RBC QN AUTO: 30.9 PG (ref 27–31)
MCHC RBC AUTO-ENTMCNC: 32.9 G/DL (ref 32–36)
MCV RBC AUTO: 93.9 FL (ref 80–99)
MONOCYTES # BLD AUTO: 0.6 10*3/MM3 (ref 0–1)
MONOCYTES NFR BLD AUTO: 12.8 % (ref 0–12)
NEUTROPHILS # BLD AUTO: 2.5 10*3/MM3 (ref 1.5–8.3)
NEUTROPHILS NFR BLD AUTO: 56.2 % (ref 41–71)
PLATELET # BLD AUTO: 252 10*3/MM3 (ref 150–450)
PMV BLD AUTO: 7.5 FL (ref 6–12)
POTASSIUM BLD-SCNC: 4 MMOL/L (ref 3.5–5.5)
PROT SERPL-MCNC: 5.8 G/DL (ref 5.7–8.2)
RBC # BLD AUTO: 3.1 10*6/MM3 (ref 3.89–5.14)
SODIUM BLD-SCNC: 141 MMOL/L (ref 132–146)
WBC NRBC COR # BLD: 4.5 10*3/MM3 (ref 3.5–10.8)

## 2018-04-03 PROCEDURE — 96374 THER/PROPH/DIAG INJ IV PUSH: CPT

## 2018-04-03 PROCEDURE — 25010000002 DEXAMETHASONE PER 1 MG: Performed by: INTERNAL MEDICINE

## 2018-04-03 PROCEDURE — 96375 TX/PRO/DX INJ NEW DRUG ADDON: CPT

## 2018-04-03 PROCEDURE — 25010000002 DIPHENHYDRAMINE PER 50 MG: Performed by: INTERNAL MEDICINE

## 2018-04-03 PROCEDURE — 25010000002 HEPARIN FLUSH (PORCINE) 100 UNIT/ML SOLUTION: Performed by: INTERNAL MEDICINE

## 2018-04-03 PROCEDURE — 25010000002 PACLITAXEL PER 30 MG: Performed by: INTERNAL MEDICINE

## 2018-04-03 PROCEDURE — 85025 COMPLETE CBC W/AUTO DIFF WBC: CPT

## 2018-04-03 PROCEDURE — 96413 CHEMO IV INFUSION 1 HR: CPT

## 2018-04-03 PROCEDURE — 80053 COMPREHEN METABOLIC PANEL: CPT

## 2018-04-03 RX ORDER — FAMOTIDINE 10 MG/ML
20 INJECTION, SOLUTION INTRAVENOUS ONCE
Status: COMPLETED | OUTPATIENT
Start: 2018-04-03 | End: 2018-04-03

## 2018-04-03 RX ORDER — SODIUM CHLORIDE 0.9 % (FLUSH) 0.9 %
10 SYRINGE (ML) INJECTION AS NEEDED
Status: CANCELLED | OUTPATIENT
Start: 2018-04-03

## 2018-04-03 RX ORDER — SODIUM CHLORIDE 9 MG/ML
250 INJECTION, SOLUTION INTRAVENOUS ONCE
Status: COMPLETED | OUTPATIENT
Start: 2018-04-03 | End: 2018-04-03

## 2018-04-03 RX ADMIN — PACLITAXEL 150 MG: 6 INJECTION, SOLUTION INTRAVENOUS at 09:44

## 2018-04-03 RX ADMIN — SODIUM CHLORIDE 250 ML: 9 INJECTION, SOLUTION INTRAVENOUS at 09:20

## 2018-04-03 RX ADMIN — DEXAMETHASONE SODIUM PHOSPHATE 12 MG: 4 INJECTION, SOLUTION INTRAMUSCULAR; INTRAVENOUS at 09:21

## 2018-04-03 RX ADMIN — DIPHENHYDRAMINE HYDROCHLORIDE 25 MG: 50 INJECTION INTRAMUSCULAR; INTRAVENOUS at 09:21

## 2018-04-03 RX ADMIN — FAMOTIDINE 20 MG: 10 INJECTION, SOLUTION INTRAVENOUS at 09:21

## 2018-04-03 RX ADMIN — HEPARIN 500 UNITS: 100 SYRINGE at 11:03

## 2018-04-09 NOTE — PROGRESS NOTES
PROBLEM LIST:  1. [id number] (stage IIIC) triple negative IDC of the right breast  A) patient presented with a enlarging mass in the right breast for 5-6 months associated with shooting pain.  Biopsy showed high grade invasive ductal carcinoma, ER negative, MD negative, HER-2 negative.  The mass on imaging measures approximately 9.7 cm.  Enlarged abnormal lymph nodes are present on imaging.  FNA of 1 axillary lymph node was negative for malignancy.  PET/CT on 1/12/18 showed large necrotic right breast mass, 2 pathologically enlarged and hypermetabolic intramammary nodes, and no evidence of distant disease.  Breast MRI on 1/17/18 showed evidence of pectoral muscle involvement, as well as several satellite lesions.  B) neoadjuvant ddAC followed by weekly taxol started on 1/23/17.  2.  Hypertension  3.  Anxiety  4.  Bell's palsy    Subjective     HISTORY OF PRESENT ILLNESS:   Estefany Bryan returns today for follow-up.  So far she has been doing okay with the Taxol.  Her main complaint is significant fatigue.  The Thursday after treatment is her worst day.  She has had some shooting transient pain in her hands and feet but it is not persistent.  She does not have any mouth sores.  She has some mild muscle aches in her thighs.    Past Medical History, Past Surgical History, Social History, Family History have been reviewed and are without significant changes except as mentioned.    Review of Systems   A comprehensive 14 point review of systems was performed and was negative except as mentioned.    Medications:  The current medication list was reviewed in the EMR    ALLERGIES:    Allergies   Allergen Reactions   • Chlorhexidine Other (See Comments)     Pt. developed redness and burning sensation after using.   • Codeine GI Intolerance       Objective      /72   Pulse 101   Temp 97.5 °F (36.4 °C) (Temporal Artery )   Resp 12   Wt 79.4 kg (175 lb)   BMI 27.41 kg/m²      Performance Status: 0    General:  well appearing female in no acute distress  Neuro: alert and oriented  HEENT: sclera anicteric, oropharynx clear  Lymphatics: no cervical, supraclavicular adenopathy. No palpable axillary adenopathy  Breast: In the right breast there is an upper outer quadrant mass which is approximately 4 cm in greatest dimension.  No erythema or warmth  Cardiovascular: regular rate and rhythm, no murmurs  Lungs: clear to auscultation bilaterally  Abdomen: soft, nontender, nondistended.  No palpable organomegaly  Extremeties: no lower extremity edema  Skin: no rashes, lesions, bruising, or petechiae  Psych: mood and affect appropriate          Assessment/Plan   Estefany Bryan is a 42 y.o. year old female with [id number] triple negative breast cancer who returns for follow up on weekly taxol.  Overall she is doing well with fatigue as her most prominent side effect.  We discussed that her energy level will improve after treatment is completed but it can take 6-12 months to return to normal.  She is having some transient neuropathy symptoms in both her hands and feet but nothing that is persistent.  If the symptoms become persistent or severe we may need to consider a dose reduction.  We'll proceed with cycle 4 today.  I will see her back again in 2 weeks.    F/u 2 weeks.                Visit time was 25 minutes, greater than 50% spent in counseling      Winsome Romano MD  [facility]    4/10/2018          CC:

## 2018-04-10 ENCOUNTER — OFFICE VISIT (OUTPATIENT)
Dept: ONCOLOGY | Facility: CLINIC | Age: 43
End: 2018-04-10

## 2018-04-10 ENCOUNTER — INFUSION (OUTPATIENT)
Dept: ONCOLOGY | Facility: HOSPITAL | Age: 43
End: 2018-04-10

## 2018-04-10 VITALS — DIASTOLIC BLOOD PRESSURE: 66 MMHG | HEART RATE: 89 BPM | SYSTOLIC BLOOD PRESSURE: 104 MMHG

## 2018-04-10 VITALS
WEIGHT: 175 LBS | HEART RATE: 101 BPM | RESPIRATION RATE: 12 BRPM | BODY MASS INDEX: 27.41 KG/M2 | DIASTOLIC BLOOD PRESSURE: 72 MMHG | SYSTOLIC BLOOD PRESSURE: 108 MMHG | TEMPERATURE: 97.5 F

## 2018-04-10 DIAGNOSIS — Z17.1 MALIGNANT NEOPLASM OF RIGHT BREAST IN FEMALE, ESTROGEN RECEPTOR NEGATIVE, UNSPECIFIED SITE OF BREAST (HCC): Primary | ICD-10-CM

## 2018-04-10 DIAGNOSIS — C50.911 MALIGNANT NEOPLASM OF RIGHT BREAST IN FEMALE, ESTROGEN RECEPTOR NEGATIVE, UNSPECIFIED SITE OF BREAST (HCC): Primary | ICD-10-CM

## 2018-04-10 DIAGNOSIS — Z17.1 MALIGNANT NEOPLASM OF AREOLA OF RIGHT BREAST IN FEMALE, ESTROGEN RECEPTOR NEGATIVE (HCC): Primary | ICD-10-CM

## 2018-04-10 DIAGNOSIS — C50.911 MALIGNANT NEOPLASM OF RIGHT BREAST IN FEMALE, ESTROGEN RECEPTOR NEGATIVE, UNSPECIFIED SITE OF BREAST (HCC): ICD-10-CM

## 2018-04-10 DIAGNOSIS — Z17.1 MALIGNANT NEOPLASM OF RIGHT BREAST IN FEMALE, ESTROGEN RECEPTOR NEGATIVE, UNSPECIFIED SITE OF BREAST (HCC): ICD-10-CM

## 2018-04-10 DIAGNOSIS — C50.011 MALIGNANT NEOPLASM OF AREOLA OF RIGHT BREAST IN FEMALE, ESTROGEN RECEPTOR NEGATIVE (HCC): ICD-10-CM

## 2018-04-10 DIAGNOSIS — Z17.1 MALIGNANT NEOPLASM OF AREOLA OF RIGHT BREAST IN FEMALE, ESTROGEN RECEPTOR NEGATIVE (HCC): ICD-10-CM

## 2018-04-10 DIAGNOSIS — C50.011 MALIGNANT NEOPLASM OF AREOLA OF RIGHT BREAST IN FEMALE, ESTROGEN RECEPTOR NEGATIVE (HCC): Primary | ICD-10-CM

## 2018-04-10 LAB
ALBUMIN SERPL-MCNC: 4 G/DL (ref 3.2–4.8)
ALBUMIN/GLOB SERPL: 1.6 G/DL (ref 1.5–2.5)
ALP SERPL-CCNC: 75 U/L (ref 25–100)
ALT SERPL W P-5'-P-CCNC: 92 U/L (ref 7–40)
ANION GAP SERPL CALCULATED.3IONS-SCNC: 6 MMOL/L (ref 3–11)
AST SERPL-CCNC: 36 U/L (ref 0–33)
BILIRUB SERPL-MCNC: 0.3 MG/DL (ref 0.3–1.2)
BUN BLD-MCNC: 16 MG/DL (ref 9–23)
BUN/CREAT SERPL: 26.7 (ref 7–25)
CALCIUM SPEC-SCNC: 8.7 MG/DL (ref 8.7–10.4)
CHLORIDE SERPL-SCNC: 107 MMOL/L (ref 99–109)
CO2 SERPL-SCNC: 25 MMOL/L (ref 20–31)
CREAT BLD-MCNC: 0.6 MG/DL (ref 0.6–1.3)
ERYTHROCYTE [DISTWIDTH] IN BLOOD BY AUTOMATED COUNT: 18.3 % (ref 11.3–14.5)
GFR SERPL CREATININE-BSD FRML MDRD: 110 ML/MIN/1.73
GLOBULIN UR ELPH-MCNC: 2.5 GM/DL
GLUCOSE BLD-MCNC: 87 MG/DL (ref 70–100)
HCT VFR BLD AUTO: 30.4 % (ref 34.5–44)
HGB BLD-MCNC: 10.1 G/DL (ref 11.5–15.5)
LYMPHOCYTES # BLD AUTO: 1.5 10*3/MM3 (ref 0.6–4.8)
LYMPHOCYTES NFR BLD AUTO: 34.7 % (ref 24–44)
MCH RBC QN AUTO: 31.2 PG (ref 27–31)
MCHC RBC AUTO-ENTMCNC: 33.3 G/DL (ref 32–36)
MCV RBC AUTO: 93.7 FL (ref 80–99)
MONOCYTES # BLD AUTO: 0.4 10*3/MM3 (ref 0–1)
MONOCYTES NFR BLD AUTO: 10.7 % (ref 0–12)
NEUTROPHILS # BLD AUTO: 2.3 10*3/MM3 (ref 1.5–8.3)
NEUTROPHILS NFR BLD AUTO: 54.6 % (ref 41–71)
PLATELET # BLD AUTO: 272 10*3/MM3 (ref 150–450)
PMV BLD AUTO: 7.6 FL (ref 6–12)
POTASSIUM BLD-SCNC: 3.8 MMOL/L (ref 3.5–5.5)
PROT SERPL-MCNC: 6.5 G/DL (ref 5.7–8.2)
RBC # BLD AUTO: 3.24 10*6/MM3 (ref 3.89–5.14)
SODIUM BLD-SCNC: 138 MMOL/L (ref 132–146)
WBC NRBC COR # BLD: 4.2 10*3/MM3 (ref 3.5–10.8)

## 2018-04-10 PROCEDURE — 96413 CHEMO IV INFUSION 1 HR: CPT

## 2018-04-10 PROCEDURE — 80053 COMPREHEN METABOLIC PANEL: CPT

## 2018-04-10 PROCEDURE — 25010000002 DEXAMETHASONE PER 1 MG: Performed by: INTERNAL MEDICINE

## 2018-04-10 PROCEDURE — 99214 OFFICE O/P EST MOD 30 MIN: CPT | Performed by: INTERNAL MEDICINE

## 2018-04-10 PROCEDURE — 25010000002 PACLITAXEL PER 30 MG: Performed by: INTERNAL MEDICINE

## 2018-04-10 PROCEDURE — 96375 TX/PRO/DX INJ NEW DRUG ADDON: CPT

## 2018-04-10 PROCEDURE — 25010000002 HEPARIN FLUSH (PORCINE) 100 UNIT/ML SOLUTION: Performed by: INTERNAL MEDICINE

## 2018-04-10 PROCEDURE — 25010000002 DIPHENHYDRAMINE PER 50 MG: Performed by: INTERNAL MEDICINE

## 2018-04-10 PROCEDURE — 85025 COMPLETE CBC W/AUTO DIFF WBC: CPT

## 2018-04-10 RX ORDER — TRAMADOL HYDROCHLORIDE 50 MG/1
50 TABLET ORAL EVERY 6 HOURS PRN
Qty: 20 TABLET | Refills: 0 | OUTPATIENT
Start: 2018-04-10 | End: 2018-04-27 | Stop reason: SDUPTHER

## 2018-04-10 RX ORDER — FAMOTIDINE 10 MG/ML
20 INJECTION, SOLUTION INTRAVENOUS ONCE
Status: COMPLETED | OUTPATIENT
Start: 2018-04-10 | End: 2018-04-10

## 2018-04-10 RX ORDER — FAMOTIDINE 10 MG/ML
20 INJECTION, SOLUTION INTRAVENOUS ONCE
Status: CANCELLED | OUTPATIENT
Start: 2018-04-10

## 2018-04-10 RX ORDER — SODIUM CHLORIDE 9 MG/ML
250 INJECTION, SOLUTION INTRAVENOUS ONCE
Status: CANCELLED | OUTPATIENT
Start: 2018-04-10

## 2018-04-10 RX ORDER — FAMOTIDINE 10 MG/ML
20 INJECTION, SOLUTION INTRAVENOUS ONCE
Status: CANCELLED | OUTPATIENT
Start: 2018-04-17

## 2018-04-10 RX ORDER — SODIUM CHLORIDE 9 MG/ML
250 INJECTION, SOLUTION INTRAVENOUS ONCE
Status: CANCELLED | OUTPATIENT
Start: 2018-04-17

## 2018-04-10 RX ORDER — SODIUM CHLORIDE 0.9 % (FLUSH) 0.9 %
10 SYRINGE (ML) INJECTION AS NEEDED
Status: CANCELLED | OUTPATIENT
Start: 2018-04-10

## 2018-04-10 RX ORDER — SODIUM CHLORIDE 0.9 % (FLUSH) 0.9 %
10 SYRINGE (ML) INJECTION AS NEEDED
Status: DISCONTINUED | OUTPATIENT
Start: 2018-04-10 | End: 2018-04-10 | Stop reason: HOSPADM

## 2018-04-10 RX ORDER — SODIUM CHLORIDE 9 MG/ML
250 INJECTION, SOLUTION INTRAVENOUS ONCE
Status: COMPLETED | OUTPATIENT
Start: 2018-04-10 | End: 2018-04-10

## 2018-04-10 RX ADMIN — Medication 10 ML: at 10:56

## 2018-04-10 RX ADMIN — HEPARIN 500 UNITS: 100 SYRINGE at 10:56

## 2018-04-10 RX ADMIN — SODIUM CHLORIDE 250 ML: 9 INJECTION, SOLUTION INTRAVENOUS at 09:26

## 2018-04-10 RX ADMIN — DEXAMETHASONE SODIUM PHOSPHATE 12 MG: 4 INJECTION, SOLUTION INTRAMUSCULAR; INTRAVENOUS at 09:26

## 2018-04-10 RX ADMIN — DIPHENHYDRAMINE HYDROCHLORIDE 25 MG: 50 INJECTION INTRAMUSCULAR; INTRAVENOUS at 09:26

## 2018-04-10 RX ADMIN — FAMOTIDINE 20 MG: 10 INJECTION, SOLUTION INTRAVENOUS at 09:26

## 2018-04-10 RX ADMIN — PACLITAXEL 150 MG: 6 INJECTION, SOLUTION INTRAVENOUS at 09:48

## 2018-04-12 ENCOUNTER — TELEPHONE (OUTPATIENT)
Dept: ONCOLOGY | Facility: CLINIC | Age: 43
End: 2018-04-12

## 2018-04-12 NOTE — TELEPHONE ENCOUNTER
----- Message from Aleksandra London Rep sent at 4/12/2018  9:49 AM EDT -----  Regarding: Emily  Contact: [phone]  Pt needs Tramadol called into [facility] in [place]. She didn't have the #

## 2018-04-17 ENCOUNTER — INFUSION (OUTPATIENT)
Dept: ONCOLOGY | Facility: HOSPITAL | Age: 43
End: 2018-04-17

## 2018-04-17 VITALS
TEMPERATURE: 96.8 F | BODY MASS INDEX: 28.41 KG/M2 | SYSTOLIC BLOOD PRESSURE: 117 MMHG | RESPIRATION RATE: 16 BRPM | HEART RATE: 89 BPM | WEIGHT: 181 LBS | HEIGHT: 67 IN | DIASTOLIC BLOOD PRESSURE: 78 MMHG

## 2018-04-17 DIAGNOSIS — Z17.1 MALIGNANT NEOPLASM OF AREOLA OF RIGHT BREAST IN FEMALE, ESTROGEN RECEPTOR NEGATIVE (HCC): Primary | ICD-10-CM

## 2018-04-17 DIAGNOSIS — C50.011 MALIGNANT NEOPLASM OF AREOLA OF RIGHT BREAST IN FEMALE, ESTROGEN RECEPTOR NEGATIVE (HCC): Primary | ICD-10-CM

## 2018-04-17 DIAGNOSIS — Z17.1 MALIGNANT NEOPLASM OF RIGHT BREAST IN FEMALE, ESTROGEN RECEPTOR NEGATIVE, UNSPECIFIED SITE OF BREAST (HCC): ICD-10-CM

## 2018-04-17 DIAGNOSIS — C50.911 MALIGNANT NEOPLASM OF RIGHT BREAST IN FEMALE, ESTROGEN RECEPTOR NEGATIVE, UNSPECIFIED SITE OF BREAST (HCC): ICD-10-CM

## 2018-04-17 LAB
ALBUMIN SERPL-MCNC: 3.6 G/DL (ref 3.2–4.8)
ALBUMIN/GLOB SERPL: 2 G/DL (ref 1.5–2.5)
ALP SERPL-CCNC: 75 U/L (ref 25–100)
ALT SERPL W P-5'-P-CCNC: 108 U/L (ref 7–40)
ANION GAP SERPL CALCULATED.3IONS-SCNC: 6 MMOL/L (ref 3–11)
AST SERPL-CCNC: 57 U/L (ref 0–33)
BILIRUB SERPL-MCNC: 0.2 MG/DL (ref 0.3–1.2)
BUN BLD-MCNC: 10 MG/DL (ref 9–23)
BUN/CREAT SERPL: 12.5 (ref 7–25)
CALCIUM SPEC-SCNC: 8.6 MG/DL (ref 8.7–10.4)
CHLORIDE SERPL-SCNC: 103 MMOL/L (ref 99–109)
CO2 SERPL-SCNC: 26 MMOL/L (ref 20–31)
CREAT BLD-MCNC: 0.8 MG/DL (ref 0.6–1.3)
ERYTHROCYTE [DISTWIDTH] IN BLOOD BY AUTOMATED COUNT: 19.5 % (ref 11.3–14.5)
GFR SERPL CREATININE-BSD FRML MDRD: 79 ML/MIN/1.73
GLOBULIN UR ELPH-MCNC: 1.8 GM/DL
GLUCOSE BLD-MCNC: 99 MG/DL (ref 70–100)
HCT VFR BLD AUTO: 30.3 % (ref 34.5–44)
HGB BLD-MCNC: 11.1 G/DL (ref 11.5–15.5)
LYMPHOCYTES # BLD AUTO: 1.4 10*3/MM3 (ref 0.6–4.8)
LYMPHOCYTES NFR BLD AUTO: 31.6 % (ref 24–44)
MCH RBC QN AUTO: 35.1 PG (ref 27–31)
MCHC RBC AUTO-ENTMCNC: 36.7 G/DL (ref 32–36)
MCV RBC AUTO: 95.5 FL (ref 80–99)
MONOCYTES # BLD AUTO: 0.1 10*3/MM3 (ref 0–1)
MONOCYTES NFR BLD AUTO: 2.2 % (ref 0–12)
NEUTROPHILS # BLD AUTO: 3 10*3/MM3 (ref 1.5–8.3)
NEUTROPHILS NFR BLD AUTO: 66.2 % (ref 41–71)
PLATELET # BLD AUTO: 259 10*3/MM3 (ref 150–450)
PMV BLD AUTO: 7.7 FL (ref 6–12)
POTASSIUM BLD-SCNC: 3.9 MMOL/L (ref 3.5–5.5)
PROT SERPL-MCNC: 5.4 G/DL (ref 5.7–8.2)
RBC # BLD AUTO: 3.18 10*6/MM3 (ref 3.89–5.14)
SODIUM BLD-SCNC: 135 MMOL/L (ref 132–146)
WBC NRBC COR # BLD: 4.5 10*3/MM3 (ref 3.5–10.8)

## 2018-04-17 PROCEDURE — 25010000002 HEPARIN FLUSH (PORCINE) 100 UNIT/ML SOLUTION: Performed by: INTERNAL MEDICINE

## 2018-04-17 PROCEDURE — 25010000002 PACLITAXEL PER 30 MG: Performed by: INTERNAL MEDICINE

## 2018-04-17 PROCEDURE — 85025 COMPLETE CBC W/AUTO DIFF WBC: CPT

## 2018-04-17 PROCEDURE — 96376 TX/PRO/DX INJ SAME DRUG ADON: CPT

## 2018-04-17 PROCEDURE — 25010000002 DIPHENHYDRAMINE PER 50 MG: Performed by: INTERNAL MEDICINE

## 2018-04-17 PROCEDURE — 25010000002 DEXAMETHASONE PER 1 MG: Performed by: INTERNAL MEDICINE

## 2018-04-17 PROCEDURE — 96413 CHEMO IV INFUSION 1 HR: CPT

## 2018-04-17 PROCEDURE — 80053 COMPREHEN METABOLIC PANEL: CPT

## 2018-04-17 PROCEDURE — 96375 TX/PRO/DX INJ NEW DRUG ADDON: CPT

## 2018-04-17 RX ORDER — SODIUM CHLORIDE 0.9 % (FLUSH) 0.9 %
10 SYRINGE (ML) INJECTION AS NEEDED
Status: CANCELLED | OUTPATIENT
Start: 2018-04-17

## 2018-04-17 RX ORDER — SODIUM CHLORIDE 0.9 % (FLUSH) 0.9 %
10 SYRINGE (ML) INJECTION AS NEEDED
Status: DISCONTINUED | OUTPATIENT
Start: 2018-04-17 | End: 2018-04-17 | Stop reason: HOSPADM

## 2018-04-17 RX ORDER — SODIUM CHLORIDE 9 MG/ML
250 INJECTION, SOLUTION INTRAVENOUS ONCE
Status: COMPLETED | OUTPATIENT
Start: 2018-04-17 | End: 2018-04-17

## 2018-04-17 RX ORDER — FAMOTIDINE 10 MG/ML
20 INJECTION, SOLUTION INTRAVENOUS ONCE
Status: COMPLETED | OUTPATIENT
Start: 2018-04-17 | End: 2018-04-17

## 2018-04-17 RX ADMIN — PACLITAXEL 150 MG: 6 INJECTION, SOLUTION INTRAVENOUS at 09:50

## 2018-04-17 RX ADMIN — DIPHENHYDRAMINE HYDROCHLORIDE 25 MG: 50 INJECTION INTRAMUSCULAR; INTRAVENOUS at 09:28

## 2018-04-17 RX ADMIN — DEXAMETHASONE SODIUM PHOSPHATE 12 MG: 4 INJECTION, SOLUTION INTRAMUSCULAR; INTRAVENOUS at 09:28

## 2018-04-17 RX ADMIN — HEPARIN 500 UNITS: 100 SYRINGE at 10:58

## 2018-04-17 RX ADMIN — FAMOTIDINE 20 MG: 10 INJECTION, SOLUTION INTRAVENOUS at 09:28

## 2018-04-17 RX ADMIN — SODIUM CHLORIDE 250 ML: 9 INJECTION, SOLUTION INTRAVENOUS at 09:28

## 2018-04-24 ENCOUNTER — OFFICE VISIT (OUTPATIENT)
Dept: ONCOLOGY | Facility: CLINIC | Age: 43
End: 2018-04-24

## 2018-04-24 ENCOUNTER — INFUSION (OUTPATIENT)
Dept: ONCOLOGY | Facility: HOSPITAL | Age: 43
End: 2018-04-24

## 2018-04-24 ENCOUNTER — APPOINTMENT (OUTPATIENT)
Dept: ONCOLOGY | Facility: HOSPITAL | Age: 43
End: 2018-04-24

## 2018-04-24 VITALS
TEMPERATURE: 98 F | WEIGHT: 182.6 LBS | OXYGEN SATURATION: 98 % | SYSTOLIC BLOOD PRESSURE: 113 MMHG | DIASTOLIC BLOOD PRESSURE: 84 MMHG | HEART RATE: 122 BPM | BODY MASS INDEX: 28.66 KG/M2 | HEIGHT: 67 IN | RESPIRATION RATE: 16 BRPM

## 2018-04-24 VITALS — DIASTOLIC BLOOD PRESSURE: 74 MMHG | HEART RATE: 96 BPM | SYSTOLIC BLOOD PRESSURE: 107 MMHG

## 2018-04-24 DIAGNOSIS — C50.011 MALIGNANT NEOPLASM OF AREOLA OF RIGHT BREAST IN FEMALE, ESTROGEN RECEPTOR NEGATIVE (HCC): ICD-10-CM

## 2018-04-24 DIAGNOSIS — Z17.1 MALIGNANT NEOPLASM OF RIGHT BREAST IN FEMALE, ESTROGEN RECEPTOR NEGATIVE, UNSPECIFIED SITE OF BREAST (HCC): Primary | ICD-10-CM

## 2018-04-24 DIAGNOSIS — C50.011 MALIGNANT NEOPLASM OF AREOLA OF RIGHT BREAST IN FEMALE, ESTROGEN RECEPTOR NEGATIVE (HCC): Primary | ICD-10-CM

## 2018-04-24 DIAGNOSIS — Z17.1 MALIGNANT NEOPLASM OF RIGHT BREAST IN FEMALE, ESTROGEN RECEPTOR NEGATIVE, UNSPECIFIED SITE OF BREAST (HCC): ICD-10-CM

## 2018-04-24 DIAGNOSIS — C50.911 MALIGNANT NEOPLASM OF RIGHT BREAST IN FEMALE, ESTROGEN RECEPTOR NEGATIVE, UNSPECIFIED SITE OF BREAST (HCC): ICD-10-CM

## 2018-04-24 DIAGNOSIS — C50.911 MALIGNANT NEOPLASM OF RIGHT BREAST IN FEMALE, ESTROGEN RECEPTOR NEGATIVE, UNSPECIFIED SITE OF BREAST (HCC): Primary | ICD-10-CM

## 2018-04-24 DIAGNOSIS — Z17.1 MALIGNANT NEOPLASM OF AREOLA OF RIGHT BREAST IN FEMALE, ESTROGEN RECEPTOR NEGATIVE (HCC): Primary | ICD-10-CM

## 2018-04-24 DIAGNOSIS — Z17.1 MALIGNANT NEOPLASM OF AREOLA OF RIGHT BREAST IN FEMALE, ESTROGEN RECEPTOR NEGATIVE (HCC): ICD-10-CM

## 2018-04-24 LAB
ALBUMIN SERPL-MCNC: 3.6 G/DL (ref 3.2–4.8)
ALBUMIN/GLOB SERPL: 2 G/DL (ref 1.5–2.5)
ALP SERPL-CCNC: 68 U/L (ref 25–100)
ALT SERPL W P-5'-P-CCNC: 110 U/L (ref 7–40)
ANION GAP SERPL CALCULATED.3IONS-SCNC: 7 MMOL/L (ref 3–11)
AST SERPL-CCNC: 65 U/L (ref 0–33)
BILIRUB SERPL-MCNC: 0.2 MG/DL (ref 0.3–1.2)
BUN BLD-MCNC: 9 MG/DL (ref 9–23)
BUN/CREAT SERPL: 15 (ref 7–25)
CALCIUM SPEC-SCNC: 8.5 MG/DL (ref 8.7–10.4)
CHLORIDE SERPL-SCNC: 101 MMOL/L (ref 99–109)
CO2 SERPL-SCNC: 25 MMOL/L (ref 20–31)
CREAT BLD-MCNC: 0.6 MG/DL (ref 0.6–1.3)
ERYTHROCYTE [DISTWIDTH] IN BLOOD BY AUTOMATED COUNT: 18.4 % (ref 11.3–14.5)
GFR SERPL CREATININE-BSD FRML MDRD: 110 ML/MIN/1.73
GLOBULIN UR ELPH-MCNC: 1.8 GM/DL
GLUCOSE BLD-MCNC: 87 MG/DL (ref 70–100)
HCT VFR BLD AUTO: 30.8 % (ref 34.5–44)
HGB BLD-MCNC: 11.3 G/DL (ref 11.5–15.5)
LYMPHOCYTES # BLD AUTO: 1.4 10*3/MM3 (ref 0.6–4.8)
LYMPHOCYTES NFR BLD AUTO: 32.8 % (ref 24–44)
MCH RBC QN AUTO: 35 PG (ref 27–31)
MCHC RBC AUTO-ENTMCNC: 36.8 G/DL (ref 32–36)
MCV RBC AUTO: 95 FL (ref 80–99)
MONOCYTES # BLD AUTO: 0.3 10*3/MM3 (ref 0–1)
MONOCYTES NFR BLD AUTO: 6.2 % (ref 0–12)
NEUTROPHILS # BLD AUTO: 2.7 10*3/MM3 (ref 1.5–8.3)
NEUTROPHILS NFR BLD AUTO: 61 % (ref 41–71)
PLATELET # BLD AUTO: 279 10*3/MM3 (ref 150–450)
PMV BLD AUTO: 7.4 FL (ref 6–12)
POTASSIUM BLD-SCNC: 3.8 MMOL/L (ref 3.5–5.5)
PROT SERPL-MCNC: 5.4 G/DL (ref 5.7–8.2)
RBC # BLD AUTO: 3.24 10*6/MM3 (ref 3.89–5.14)
SODIUM BLD-SCNC: 133 MMOL/L (ref 132–146)
WBC NRBC COR # BLD: 4.4 10*3/MM3 (ref 3.5–10.8)

## 2018-04-24 PROCEDURE — 96375 TX/PRO/DX INJ NEW DRUG ADDON: CPT

## 2018-04-24 PROCEDURE — 25010000002 DIPHENHYDRAMINE PER 50 MG: Performed by: INTERNAL MEDICINE

## 2018-04-24 PROCEDURE — 96413 CHEMO IV INFUSION 1 HR: CPT

## 2018-04-24 PROCEDURE — 85025 COMPLETE CBC W/AUTO DIFF WBC: CPT

## 2018-04-24 PROCEDURE — 25010000002 PACLITAXEL PER 30 MG: Performed by: INTERNAL MEDICINE

## 2018-04-24 PROCEDURE — 25010000002 DEXAMETHASONE PER 1 MG: Performed by: INTERNAL MEDICINE

## 2018-04-24 PROCEDURE — 99213 OFFICE O/P EST LOW 20 MIN: CPT | Performed by: INTERNAL MEDICINE

## 2018-04-24 PROCEDURE — 25010000002 HEPARIN FLUSH (PORCINE) 100 UNIT/ML SOLUTION: Performed by: INTERNAL MEDICINE

## 2018-04-24 PROCEDURE — 80053 COMPREHEN METABOLIC PANEL: CPT

## 2018-04-24 RX ORDER — VENLAFAXINE HYDROCHLORIDE 75 MG/1
CAPSULE, EXTENDED RELEASE ORAL
Qty: 30 CAPSULE | Refills: 2 | Status: SHIPPED | OUTPATIENT
Start: 2018-04-24 | End: 2018-05-14 | Stop reason: SDUPTHER

## 2018-04-24 RX ORDER — FAMOTIDINE 10 MG/ML
20 INJECTION, SOLUTION INTRAVENOUS ONCE
Status: COMPLETED | OUTPATIENT
Start: 2018-04-24 | End: 2018-04-24

## 2018-04-24 RX ORDER — FAMOTIDINE 10 MG/ML
20 INJECTION, SOLUTION INTRAVENOUS ONCE
Status: CANCELLED | OUTPATIENT
Start: 2018-04-24

## 2018-04-24 RX ORDER — SODIUM CHLORIDE 9 MG/ML
250 INJECTION, SOLUTION INTRAVENOUS ONCE
Status: COMPLETED | OUTPATIENT
Start: 2018-04-24 | End: 2018-04-24

## 2018-04-24 RX ORDER — SODIUM CHLORIDE 9 MG/ML
250 INJECTION, SOLUTION INTRAVENOUS ONCE
Status: CANCELLED | OUTPATIENT
Start: 2018-04-24

## 2018-04-24 RX ORDER — SODIUM CHLORIDE 9 MG/ML
250 INJECTION, SOLUTION INTRAVENOUS ONCE
Status: CANCELLED | OUTPATIENT
Start: 2018-05-01

## 2018-04-24 RX ORDER — FAMOTIDINE 10 MG/ML
20 INJECTION, SOLUTION INTRAVENOUS ONCE
Status: CANCELLED | OUTPATIENT
Start: 2018-05-01

## 2018-04-24 RX ORDER — AMITRIPTYLINE HYDROCHLORIDE 25 MG/1
TABLET, FILM COATED ORAL
Qty: 60 TABLET | Refills: 2 | Status: SHIPPED | OUTPATIENT
Start: 2018-04-24 | End: 2018-05-14 | Stop reason: SDUPTHER

## 2018-04-24 RX ORDER — SODIUM CHLORIDE 0.9 % (FLUSH) 0.9 %
10 SYRINGE (ML) INJECTION AS NEEDED
Status: CANCELLED | OUTPATIENT
Start: 2018-04-24

## 2018-04-24 RX ADMIN — DEXAMETHASONE SODIUM PHOSPHATE 12 MG: 4 INJECTION, SOLUTION INTRAMUSCULAR; INTRAVENOUS at 10:40

## 2018-04-24 RX ADMIN — FAMOTIDINE 20 MG: 10 INJECTION INTRAVENOUS at 10:40

## 2018-04-24 RX ADMIN — HEPARIN 500 UNITS: 100 SYRINGE at 12:05

## 2018-04-24 RX ADMIN — DIPHENHYDRAMINE HYDROCHLORIDE 25 MG: 50 INJECTION INTRAMUSCULAR; INTRAVENOUS at 10:40

## 2018-04-24 RX ADMIN — PACLITAXEL 120 MG: 6 INJECTION, SOLUTION INTRAVENOUS at 11:00

## 2018-04-24 RX ADMIN — SODIUM CHLORIDE 250 ML: 9 INJECTION, SOLUTION INTRAVENOUS at 10:40

## 2018-04-24 NOTE — PROGRESS NOTES
"      PROBLEM LIST:  1. 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  Biopsy showed high grade invasive ductal carcinoma, ER negative, [place] negative, HER-2 negative.  The mass on imaging measures approximately 9.7 cm.  Enlarged abnormal lymph nodes are present on imaging.  FNA of 1 axillary lymph node was negative for malignancy.  PET/CT on 1/12/18 showed large necrotic right breast mass, 2 pathologically enlarged and hypermetabolic intramammary nodes, and no evidence of distant disease.  Breast MRI on 1/17/18 showed evidence of pectoral muscle involvement, as well as several satellite lesions.  B) neoadjuvant ddAC followed by weekly taxol started on 1/23/17.  2.  Hypertension  3.  Anxiety  4.  Bell's palsy    Subjective     HISTORY OF PRESENT ILLNESS:   Estefany Bryan returns today for follow-up.  She says she's feeling about the same.  She still has some muscle aches in her thighs a few days after each treatment.  She continues to note his neuropathy symptoms in her hands and feet.  They are fairly persistent on the Thursday after treatment, and then intermittent after that.  She is having some numbness on the bottom of her feet.    Past Medical History, Past Surgical History, Social History, Family History have been reviewed and are without significant changes except as mentioned.    Review of Systems   A comprehensive 14 point review of systems was performed and was negative except as mentioned.    Medications:  The current medication list was reviewed in the EMR    ALLERGIES:    Allergies   Allergen Reactions   • Chlorhexidine Other (See Comments)     Pt. developed redness and burning sensation after using.   • Codeine GI Intolerance       Objective      /84   Pulse (!) 122   Temp 98 °F (36.7 °C) (Temporal Artery )   Resp 16   Ht 170.2 cm (67.01\")   Wt 82.8 kg (182 lb 9.6 oz)  "  SpO2 98%   BMI 28.59 kg/m²      Performance Status: 0    General: well appearing female in no acute distress  Neuro: alert and oriented  HEENT: sclera anicteric, oropharynx clear  Lymphatics: no cervical, supraclavicular adenopathy. No palpable axillary adenopathy  Breast: In the right breast there is an upper outer quadrant mass which is approximately 4 cm in greatest dimension.  No erythema or warmth  Cardiovascular: regular rate and rhythm, no murmurs  Lungs: clear to auscultation bilaterally  Abdomen: soft, nontender, nondistended.  No palpable organomegaly  Extremeties: no lower extremity edema  Skin: no rashes, lesions, bruising, or petechiae  Psych: mood and affect appropriate          Assessment/Plan   Estefany Bryan is a 42 y.o. year old female with [id number] triple negative breast cancer who returns for follow up on weekly taxol.  Her neuropathy has progressed somewhat over the past few weeks.  I'm going to reduce her Taxol dose to 80%.  As long as her symptoms do not progress further we will continue with this dose reduction for the remainder of her treatment.  She otherwise is having some myalgias and fatigue related to therapy.  I will see her back again in 2 weeks for ongoing chemotherapy.                    Visit time was 15 minutes, greater than 50% spent in counseling      Winsome Romano MD  [facility] Hematology and Oncology    4/24/2018          CC:

## 2018-04-24 NOTE — TELEPHONE ENCOUNTER
I had her on one 25mg tablet at hs, if she has been taking two tabs that is ok but I need to know so I can send in correct amount, I take it she is sleeping ok on the dose she is taking?

## 2018-04-27 ENCOUNTER — TELEPHONE (OUTPATIENT)
Dept: ONCOLOGY | Facility: CLINIC | Age: 43
End: 2018-04-27

## 2018-04-27 RX ORDER — TRAMADOL HYDROCHLORIDE 50 MG/1
50 TABLET ORAL EVERY 6 HOURS PRN
Qty: 20 TABLET | Refills: 0 | OUTPATIENT
Start: 2018-04-27 | End: 2018-05-09 | Stop reason: SDUPTHER

## 2018-04-27 NOTE — TELEPHONE ENCOUNTER
----- Message from Darcie Obando sent at 4/27/2018 12:59 PM EDT -----  Regarding: ERIC - RX REFILL   Contact: [phone]  PATIENT IS NEEDING A REFILL ON TRAMADOL. SHE SAID SHE HAD CALLED THE OTHER DAY.

## 2018-05-01 ENCOUNTER — INFUSION (OUTPATIENT)
Dept: ONCOLOGY | Facility: HOSPITAL | Age: 43
End: 2018-05-01

## 2018-05-01 VITALS
DIASTOLIC BLOOD PRESSURE: 77 MMHG | HEART RATE: 79 BPM | SYSTOLIC BLOOD PRESSURE: 109 MMHG | HEIGHT: 67 IN | BODY MASS INDEX: 29.51 KG/M2 | WEIGHT: 188 LBS | RESPIRATION RATE: 16 BRPM | TEMPERATURE: 97.5 F

## 2018-05-01 DIAGNOSIS — C50.911 MALIGNANT NEOPLASM OF RIGHT BREAST IN FEMALE, ESTROGEN RECEPTOR NEGATIVE, UNSPECIFIED SITE OF BREAST (HCC): ICD-10-CM

## 2018-05-01 DIAGNOSIS — Z17.1 MALIGNANT NEOPLASM OF RIGHT BREAST IN FEMALE, ESTROGEN RECEPTOR NEGATIVE, UNSPECIFIED SITE OF BREAST (HCC): ICD-10-CM

## 2018-05-01 DIAGNOSIS — C50.011 MALIGNANT NEOPLASM OF AREOLA OF RIGHT BREAST IN FEMALE, ESTROGEN RECEPTOR NEGATIVE (HCC): Primary | ICD-10-CM

## 2018-05-01 DIAGNOSIS — Z17.1 MALIGNANT NEOPLASM OF AREOLA OF RIGHT BREAST IN FEMALE, ESTROGEN RECEPTOR NEGATIVE (HCC): Primary | ICD-10-CM

## 2018-05-01 LAB
ALBUMIN SERPL-MCNC: 3.6 G/DL (ref 3.2–4.8)
ALBUMIN/GLOB SERPL: 1.6 G/DL (ref 1.5–2.5)
ALP SERPL-CCNC: 69 U/L (ref 25–100)
ALT SERPL W P-5'-P-CCNC: 141 U/L (ref 7–40)
ANION GAP SERPL CALCULATED.3IONS-SCNC: 6 MMOL/L (ref 3–11)
AST SERPL-CCNC: 57 U/L (ref 0–33)
BILIRUB SERPL-MCNC: 0.3 MG/DL (ref 0.3–1.2)
BUN BLD-MCNC: 14 MG/DL (ref 9–23)
BUN/CREAT SERPL: 20 (ref 7–25)
CALCIUM SPEC-SCNC: 8.8 MG/DL (ref 8.7–10.4)
CHLORIDE SERPL-SCNC: 102 MMOL/L (ref 99–109)
CO2 SERPL-SCNC: 26 MMOL/L (ref 20–31)
CREAT BLD-MCNC: 0.7 MG/DL (ref 0.6–1.3)
ERYTHROCYTE [DISTWIDTH] IN BLOOD BY AUTOMATED COUNT: 18.3 % (ref 11.3–14.5)
GFR SERPL CREATININE-BSD FRML MDRD: 92 ML/MIN/1.73
GLOBULIN UR ELPH-MCNC: 2.2 GM/DL
GLUCOSE BLD-MCNC: 95 MG/DL (ref 70–100)
HCT VFR BLD AUTO: 30.7 % (ref 34.5–44)
HGB BLD-MCNC: 10.2 G/DL (ref 11.5–15.5)
LYMPHOCYTES # BLD AUTO: 1.5 10*3/MM3 (ref 0.6–4.8)
LYMPHOCYTES NFR BLD AUTO: 26.1 % (ref 24–44)
MCH RBC QN AUTO: 32.1 PG (ref 27–31)
MCHC RBC AUTO-ENTMCNC: 33.1 G/DL (ref 32–36)
MCV RBC AUTO: 97 FL (ref 80–99)
MONOCYTES # BLD AUTO: 0.4 10*3/MM3 (ref 0–1)
MONOCYTES NFR BLD AUTO: 7.2 % (ref 0–12)
NEUTROPHILS # BLD AUTO: 3.8 10*3/MM3 (ref 1.5–8.3)
NEUTROPHILS NFR BLD AUTO: 66.7 % (ref 41–71)
PLATELET # BLD AUTO: 261 10*3/MM3 (ref 150–450)
PMV BLD AUTO: 7.7 FL (ref 6–12)
POTASSIUM BLD-SCNC: 4.1 MMOL/L (ref 3.5–5.5)
PROT SERPL-MCNC: 5.8 G/DL (ref 5.7–8.2)
RBC # BLD AUTO: 3.17 10*6/MM3 (ref 3.89–5.14)
SODIUM BLD-SCNC: 134 MMOL/L (ref 132–146)
WBC NRBC COR # BLD: 5.7 10*3/MM3 (ref 3.5–10.8)

## 2018-05-01 PROCEDURE — 85025 COMPLETE CBC W/AUTO DIFF WBC: CPT

## 2018-05-01 PROCEDURE — 80053 COMPREHEN METABOLIC PANEL: CPT

## 2018-05-01 PROCEDURE — 25010000002 DEXAMETHASONE PER 1 MG: Performed by: INTERNAL MEDICINE

## 2018-05-01 PROCEDURE — 96375 TX/PRO/DX INJ NEW DRUG ADDON: CPT

## 2018-05-01 PROCEDURE — 25010000002 HEPARIN FLUSH (PORCINE) 100 UNIT/ML SOLUTION: Performed by: INTERNAL MEDICINE

## 2018-05-01 PROCEDURE — 96413 CHEMO IV INFUSION 1 HR: CPT

## 2018-05-01 PROCEDURE — 25010000002 PACLITAXEL PER 30 MG: Performed by: INTERNAL MEDICINE

## 2018-05-01 PROCEDURE — 25010000002 DIPHENHYDRAMINE PER 50 MG: Performed by: INTERNAL MEDICINE

## 2018-05-01 RX ORDER — SODIUM CHLORIDE 0.9 % (FLUSH) 0.9 %
10 SYRINGE (ML) INJECTION AS NEEDED
Status: CANCELLED | OUTPATIENT
Start: 2018-05-01

## 2018-05-01 RX ORDER — SODIUM CHLORIDE 9 MG/ML
250 INJECTION, SOLUTION INTRAVENOUS ONCE
Status: COMPLETED | OUTPATIENT
Start: 2018-05-01 | End: 2018-05-01

## 2018-05-01 RX ADMIN — PACLITAXEL 120 MG: 6 INJECTION, SOLUTION INTRAVENOUS at 09:30

## 2018-05-01 RX ADMIN — DEXAMETHASONE SODIUM PHOSPHATE 12 MG: 4 INJECTION, SOLUTION INTRAMUSCULAR; INTRAVENOUS at 09:04

## 2018-05-01 RX ADMIN — SODIUM CHLORIDE 250 ML: 9 INJECTION, SOLUTION INTRAVENOUS at 09:00

## 2018-05-01 RX ADMIN — DIPHENHYDRAMINE HYDROCHLORIDE 25 MG: 50 INJECTION INTRAMUSCULAR; INTRAVENOUS at 09:04

## 2018-05-01 RX ADMIN — FAMOTIDINE 20 MG: 10 INJECTION INTRAVENOUS at 09:02

## 2018-05-01 RX ADMIN — HEPARIN 500 UNITS: 100 SYRINGE at 10:34

## 2018-05-04 NOTE — PROGRESS NOTES
"      PROBLEM LIST:  1. 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  Biopsy showed high grade invasive ductal carcinoma, ER negative, [place] negative, HER-2 negative.  The mass on imaging measures approximately 9.7 cm.  Enlarged abnormal lymph nodes are present on imaging.  FNA of 1 axillary lymph node was negative for malignancy.  PET/CT on 1/12/18 showed large necrotic right breast mass, 2 pathologically enlarged and hypermetabolic intramammary nodes, and no evidence of distant disease.  Breast MRI on 1/17/18 showed evidence of pectoral muscle involvement, as well as several satellite lesions.  B) neoadjuvant ddAC followed by weekly taxol started on 1/23/17.  2.  Hypertension  3.  Anxiety  4.  Bell's palsy    Subjective     HISTORY OF PRESENT ILLNESS:   Estefany Bryan returns today for follow-up.  She says she's feeling about the same.  She has a lot of aching in her thigh muscles for 2-3 days after each treatment.  The neuropathy in her hands and feet is the same.  She does not notice any difficulty using her hands or difficulty walking.  The neuropathy in her hands and feet is not painful.  She takes Zofran every day and is not having any nausea.  She has gained some weight.    Past Medical History, Past Surgical History, Social History, Family History have been reviewed and are without significant changes except as mentioned.    Review of Systems   A comprehensive 14 point review of systems was performed and was negative except as mentioned.    Medications:  The current medication list was reviewed in the EMR    ALLERGIES:    Allergies   Allergen Reactions   • Chlorhexidine Other (See Comments)     Pt. developed redness and burning sensation after using.   • Codeine GI Intolerance       Objective      /86   Pulse 118   Temp 97.6 °F (36.4 °C)   Resp 16   Ht 170.2 cm (67\")   Wt " 87.1 kg (192 lb)   BMI 30.07 kg/m²      Performance Status: 0    General: well appearing female in no acute distress  Neuro: alert and oriented  HEENT: sclera anicteric, oropharynx clear  Lymphatics: no cervical, supraclavicular adenopathy. No palpable axillary adenopathy  Breast: In the right breast there is an upper outer quadrant mass which is approximately 4 cm in greatest dimension, continued improvement.    Cardiovascular: regular rate and rhythm, no murmurs  Lungs: clear to auscultation bilaterally  Abdomen: soft, nontender, nondistended.  No palpable organomegaly  Extremeties: no lower extremity edema  Skin: no rashes, lesions, bruising, or petechiae  Psych: mood and affect appropriate          Assessment/Plan   Estefany Bryan is a 42 y.o. year old female with [id number] triple negative breast cancer who returns for follow up on weekly taxol.  She has grade 1 neuropathy which is stable on a 20% dose reduction.  We will continue with the same dose.  If she has progression of her neuropathy symptoms I will plan to reduced to 50%.    She has myalgias which are common with taxane treatment.  We discussed that these will improve once treatment is complete.    Once she has completed her chemotherapy she will see Dr. Adeel Powers to discuss surgery, after surgery she will need radiation treatment.    Follow-up in 2 weeks.                  Visit time was 25 minutes, greater than 50% spent in counseling      Winsome Romano MD  [facility] Hematology and Oncology    5/8/2018          CC:

## 2018-05-08 ENCOUNTER — INFUSION (OUTPATIENT)
Dept: ONCOLOGY | Facility: HOSPITAL | Age: 43
End: 2018-05-08

## 2018-05-08 ENCOUNTER — OFFICE VISIT (OUTPATIENT)
Dept: ONCOLOGY | Facility: CLINIC | Age: 43
End: 2018-05-08

## 2018-05-08 VITALS
DIASTOLIC BLOOD PRESSURE: 86 MMHG | TEMPERATURE: 97.6 F | HEIGHT: 67 IN | RESPIRATION RATE: 16 BRPM | BODY MASS INDEX: 30.13 KG/M2 | HEART RATE: 118 BPM | WEIGHT: 192 LBS | SYSTOLIC BLOOD PRESSURE: 115 MMHG

## 2018-05-08 VITALS — SYSTOLIC BLOOD PRESSURE: 123 MMHG | DIASTOLIC BLOOD PRESSURE: 77 MMHG | HEART RATE: 91 BPM

## 2018-05-08 DIAGNOSIS — Z17.1 MALIGNANT NEOPLASM OF RIGHT BREAST IN FEMALE, ESTROGEN RECEPTOR NEGATIVE, UNSPECIFIED SITE OF BREAST (HCC): Primary | ICD-10-CM

## 2018-05-08 DIAGNOSIS — C50.011 MALIGNANT NEOPLASM OF AREOLA OF RIGHT BREAST IN FEMALE, ESTROGEN RECEPTOR NEGATIVE (HCC): Primary | ICD-10-CM

## 2018-05-08 DIAGNOSIS — C50.911 MALIGNANT NEOPLASM OF RIGHT BREAST IN FEMALE, ESTROGEN RECEPTOR NEGATIVE, UNSPECIFIED SITE OF BREAST (HCC): Primary | ICD-10-CM

## 2018-05-08 DIAGNOSIS — Z17.1 MALIGNANT NEOPLASM OF AREOLA OF RIGHT BREAST IN FEMALE, ESTROGEN RECEPTOR NEGATIVE (HCC): Primary | ICD-10-CM

## 2018-05-08 DIAGNOSIS — Z17.1 MALIGNANT NEOPLASM OF AREOLA OF RIGHT BREAST IN FEMALE, ESTROGEN RECEPTOR NEGATIVE (HCC): ICD-10-CM

## 2018-05-08 DIAGNOSIS — C50.911 MALIGNANT NEOPLASM OF RIGHT BREAST IN FEMALE, ESTROGEN RECEPTOR NEGATIVE, UNSPECIFIED SITE OF BREAST (HCC): ICD-10-CM

## 2018-05-08 DIAGNOSIS — C50.011 MALIGNANT NEOPLASM OF AREOLA OF RIGHT BREAST IN FEMALE, ESTROGEN RECEPTOR NEGATIVE (HCC): ICD-10-CM

## 2018-05-08 DIAGNOSIS — Z17.1 MALIGNANT NEOPLASM OF RIGHT BREAST IN FEMALE, ESTROGEN RECEPTOR NEGATIVE, UNSPECIFIED SITE OF BREAST (HCC): ICD-10-CM

## 2018-05-08 LAB
ALBUMIN SERPL-MCNC: 3.7 G/DL (ref 3.2–4.8)
ALBUMIN/GLOB SERPL: 1.8 G/DL (ref 1.5–2.5)
ALP SERPL-CCNC: 61 U/L (ref 25–100)
ALT SERPL W P-5'-P-CCNC: 66 U/L (ref 7–40)
ANION GAP SERPL CALCULATED.3IONS-SCNC: 3 MMOL/L (ref 3–11)
AST SERPL-CCNC: 28 U/L (ref 0–33)
BILIRUB SERPL-MCNC: 0.2 MG/DL (ref 0.3–1.2)
BUN BLD-MCNC: 16 MG/DL (ref 9–23)
BUN/CREAT SERPL: 22.9 (ref 7–25)
CALCIUM SPEC-SCNC: 8.7 MG/DL (ref 8.7–10.4)
CHLORIDE SERPL-SCNC: 105 MMOL/L (ref 99–109)
CO2 SERPL-SCNC: 27 MMOL/L (ref 20–31)
CREAT BLD-MCNC: 0.7 MG/DL (ref 0.6–1.3)
ERYTHROCYTE [DISTWIDTH] IN BLOOD BY AUTOMATED COUNT: 16.7 % (ref 11.3–14.5)
GFR SERPL CREATININE-BSD FRML MDRD: 92 ML/MIN/1.73
GLOBULIN UR ELPH-MCNC: 2.1 GM/DL
GLUCOSE BLD-MCNC: 100 MG/DL (ref 70–100)
HCT VFR BLD AUTO: 30.9 % (ref 34.5–44)
HGB BLD-MCNC: 10.3 G/DL (ref 11.5–15.5)
LYMPHOCYTES # BLD AUTO: 1.4 10*3/MM3 (ref 0.6–4.8)
LYMPHOCYTES NFR BLD AUTO: 27.9 % (ref 24–44)
MCH RBC QN AUTO: 32.4 PG (ref 27–31)
MCHC RBC AUTO-ENTMCNC: 33.2 G/DL (ref 32–36)
MCV RBC AUTO: 97.7 FL (ref 80–99)
MONOCYTES # BLD AUTO: 0.3 10*3/MM3 (ref 0–1)
MONOCYTES NFR BLD AUTO: 5.5 % (ref 0–12)
NEUTROPHILS # BLD AUTO: 3.3 10*3/MM3 (ref 1.5–8.3)
NEUTROPHILS NFR BLD AUTO: 66.6 % (ref 41–71)
PLATELET # BLD AUTO: 257 10*3/MM3 (ref 150–450)
PMV BLD AUTO: 7.7 FL (ref 6–12)
POTASSIUM BLD-SCNC: 4 MMOL/L (ref 3.5–5.5)
PROT SERPL-MCNC: 5.8 G/DL (ref 5.7–8.2)
RBC # BLD AUTO: 3.16 10*6/MM3 (ref 3.89–5.14)
SODIUM BLD-SCNC: 135 MMOL/L (ref 132–146)
WBC NRBC COR # BLD: 4.9 10*3/MM3 (ref 3.5–10.8)

## 2018-05-08 PROCEDURE — 25010000002 PACLITAXEL PER 30 MG: Performed by: INTERNAL MEDICINE

## 2018-05-08 PROCEDURE — 25010000002 HEPARIN FLUSH (PORCINE) 100 UNIT/ML SOLUTION: Performed by: INTERNAL MEDICINE

## 2018-05-08 PROCEDURE — 96375 TX/PRO/DX INJ NEW DRUG ADDON: CPT

## 2018-05-08 PROCEDURE — 80053 COMPREHEN METABOLIC PANEL: CPT

## 2018-05-08 PROCEDURE — 36415 COLL VENOUS BLD VENIPUNCTURE: CPT

## 2018-05-08 PROCEDURE — 99214 OFFICE O/P EST MOD 30 MIN: CPT | Performed by: INTERNAL MEDICINE

## 2018-05-08 PROCEDURE — 25010000002 DEXAMETHASONE PER 1 MG: Performed by: INTERNAL MEDICINE

## 2018-05-08 PROCEDURE — 85025 COMPLETE CBC W/AUTO DIFF WBC: CPT

## 2018-05-08 PROCEDURE — 96413 CHEMO IV INFUSION 1 HR: CPT

## 2018-05-08 PROCEDURE — 25010000002 DIPHENHYDRAMINE PER 50 MG: Performed by: INTERNAL MEDICINE

## 2018-05-08 RX ORDER — FAMOTIDINE 10 MG/ML
20 INJECTION, SOLUTION INTRAVENOUS ONCE
Status: COMPLETED | OUTPATIENT
Start: 2018-05-08 | End: 2018-05-08

## 2018-05-08 RX ORDER — FAMOTIDINE 10 MG/ML
20 INJECTION, SOLUTION INTRAVENOUS ONCE
Status: CANCELLED | OUTPATIENT
Start: 2018-05-15

## 2018-05-08 RX ORDER — SODIUM CHLORIDE 9 MG/ML
250 INJECTION, SOLUTION INTRAVENOUS ONCE
Status: CANCELLED | OUTPATIENT
Start: 2018-05-15

## 2018-05-08 RX ORDER — SODIUM CHLORIDE 0.9 % (FLUSH) 0.9 %
10 SYRINGE (ML) INJECTION AS NEEDED
Status: CANCELLED | OUTPATIENT
Start: 2018-05-08

## 2018-05-08 RX ORDER — FAMOTIDINE 10 MG/ML
20 INJECTION, SOLUTION INTRAVENOUS ONCE
Status: CANCELLED | OUTPATIENT
Start: 2018-05-08

## 2018-05-08 RX ORDER — SODIUM CHLORIDE 9 MG/ML
250 INJECTION, SOLUTION INTRAVENOUS ONCE
Status: CANCELLED | OUTPATIENT
Start: 2018-05-08

## 2018-05-08 RX ORDER — SODIUM CHLORIDE 9 MG/ML
250 INJECTION, SOLUTION INTRAVENOUS ONCE
Status: COMPLETED | OUTPATIENT
Start: 2018-05-08 | End: 2018-05-08

## 2018-05-08 RX ADMIN — HEPARIN 500 UNITS: 100 SYRINGE at 11:12

## 2018-05-08 RX ADMIN — DIPHENHYDRAMINE HYDROCHLORIDE 25 MG: 50 INJECTION INTRAMUSCULAR; INTRAVENOUS at 09:48

## 2018-05-08 RX ADMIN — PACLITAXEL 120 MG: 6 INJECTION, SOLUTION INTRAVENOUS at 10:06

## 2018-05-08 RX ADMIN — DEXAMETHASONE SODIUM PHOSPHATE 12 MG: 4 INJECTION, SOLUTION INTRAMUSCULAR; INTRAVENOUS at 09:48

## 2018-05-08 RX ADMIN — SODIUM CHLORIDE 250 ML: 9 INJECTION, SOLUTION INTRAVENOUS at 09:41

## 2018-05-08 RX ADMIN — FAMOTIDINE 20 MG: 10 INJECTION, SOLUTION INTRAVENOUS at 09:41

## 2018-05-09 ENCOUNTER — TELEPHONE (OUTPATIENT)
Dept: ONCOLOGY | Facility: CLINIC | Age: 43
End: 2018-05-09

## 2018-05-09 RX ORDER — TRAMADOL HYDROCHLORIDE 50 MG/1
50 TABLET ORAL EVERY 6 HOURS PRN
Qty: 20 TABLET | Refills: 0 | OUTPATIENT
Start: 2018-05-09 | End: 2018-05-22 | Stop reason: SDUPTHER

## 2018-05-09 NOTE — TELEPHONE ENCOUNTER
----- Message from Aleksandra London sent at 5/9/2018  1:17 PM EDT -----  Regarding: Juan Antonio-Refill  Pt needs refill on Tramadol 50MG called into [facility]

## 2018-05-14 ENCOUNTER — OFFICE VISIT (OUTPATIENT)
Dept: PSYCHIATRY | Facility: CLINIC | Age: 43
End: 2018-05-14

## 2018-05-14 VITALS — WEIGHT: 193 LBS | BODY MASS INDEX: 30.23 KG/M2

## 2018-05-14 DIAGNOSIS — F33.1 MODERATE EPISODE OF RECURRENT MAJOR DEPRESSIVE DISORDER (HCC): ICD-10-CM

## 2018-05-14 DIAGNOSIS — F41.1 GENERALIZED ANXIETY DISORDER: Primary | ICD-10-CM

## 2018-05-14 DIAGNOSIS — F51.05 INSOMNIA DUE TO MENTAL CONDITION: ICD-10-CM

## 2018-05-14 PROCEDURE — 99213 OFFICE O/P EST LOW 20 MIN: CPT | Performed by: NURSE PRACTITIONER

## 2018-05-14 RX ORDER — VENLAFAXINE HYDROCHLORIDE 75 MG/1
CAPSULE, EXTENDED RELEASE ORAL
Qty: 30 CAPSULE | Refills: 5 | Status: SHIPPED | OUTPATIENT
Start: 2018-05-14 | End: 2019-08-27

## 2018-05-14 RX ORDER — AMITRIPTYLINE HYDROCHLORIDE 50 MG/1
TABLET, FILM COATED ORAL
Qty: 30 TABLET | Refills: 5 | Status: SHIPPED | OUTPATIENT
Start: 2018-05-14 | End: 2018-11-27

## 2018-05-14 RX ORDER — AMITRIPTYLINE HYDROCHLORIDE 50 MG/1
TABLET, FILM COATED ORAL
Qty: 30 TABLET | Refills: 5 | Status: SHIPPED | OUTPATIENT
Start: 2018-05-14 | End: 2018-05-14 | Stop reason: SDUPTHER

## 2018-05-15 ENCOUNTER — INFUSION (OUTPATIENT)
Dept: ONCOLOGY | Facility: HOSPITAL | Age: 43
End: 2018-05-15

## 2018-05-15 VITALS
BODY MASS INDEX: 30.29 KG/M2 | DIASTOLIC BLOOD PRESSURE: 73 MMHG | SYSTOLIC BLOOD PRESSURE: 112 MMHG | HEIGHT: 67 IN | HEART RATE: 83 BPM | WEIGHT: 193 LBS | TEMPERATURE: 97.6 F | RESPIRATION RATE: 16 BRPM

## 2018-05-15 DIAGNOSIS — C50.911 MALIGNANT NEOPLASM OF RIGHT BREAST IN FEMALE, ESTROGEN RECEPTOR NEGATIVE, UNSPECIFIED SITE OF BREAST (HCC): ICD-10-CM

## 2018-05-15 DIAGNOSIS — C50.011 MALIGNANT NEOPLASM OF AREOLA OF RIGHT BREAST IN FEMALE, ESTROGEN RECEPTOR NEGATIVE (HCC): Primary | ICD-10-CM

## 2018-05-15 DIAGNOSIS — Z17.1 MALIGNANT NEOPLASM OF RIGHT BREAST IN FEMALE, ESTROGEN RECEPTOR NEGATIVE, UNSPECIFIED SITE OF BREAST (HCC): ICD-10-CM

## 2018-05-15 DIAGNOSIS — Z17.1 MALIGNANT NEOPLASM OF AREOLA OF RIGHT BREAST IN FEMALE, ESTROGEN RECEPTOR NEGATIVE (HCC): Primary | ICD-10-CM

## 2018-05-15 LAB
ALBUMIN SERPL-MCNC: 3.5 G/DL (ref 3.2–4.8)
ALBUMIN/GLOB SERPL: 1.6 G/DL (ref 1.5–2.5)
ALP SERPL-CCNC: 58 U/L (ref 25–100)
ALT SERPL W P-5'-P-CCNC: 52 U/L (ref 7–40)
ANION GAP SERPL CALCULATED.3IONS-SCNC: 11 MMOL/L (ref 3–11)
AST SERPL-CCNC: 41 U/L (ref 0–33)
BILIRUB SERPL-MCNC: 0.2 MG/DL (ref 0.3–1.2)
BUN BLD-MCNC: <5 MG/DL (ref 9–23)
BUN/CREAT SERPL: ABNORMAL (ref 7–25)
CALCIUM SPEC-SCNC: 8.6 MG/DL (ref 8.7–10.4)
CHLORIDE SERPL-SCNC: 105 MMOL/L (ref 99–109)
CO2 SERPL-SCNC: 18 MMOL/L (ref 20–31)
CREAT BLD-MCNC: 0.7 MG/DL (ref 0.6–1.3)
ERYTHROCYTE [DISTWIDTH] IN BLOOD BY AUTOMATED COUNT: 16 % (ref 11.3–14.5)
GFR SERPL CREATININE-BSD FRML MDRD: 92 ML/MIN/1.73
GLOBULIN UR ELPH-MCNC: 2.2 GM/DL
GLUCOSE BLD-MCNC: 125 MG/DL (ref 70–100)
HCT VFR BLD AUTO: 32.5 % (ref 34.5–44)
HGB BLD-MCNC: 10.7 G/DL (ref 11.5–15.5)
LYMPHOCYTES # BLD AUTO: 1.5 10*3/MM3 (ref 0.6–4.8)
LYMPHOCYTES NFR BLD AUTO: 31.3 % (ref 24–44)
MCH RBC QN AUTO: 32.6 PG (ref 27–31)
MCHC RBC AUTO-ENTMCNC: 33 G/DL (ref 32–36)
MCV RBC AUTO: 98.9 FL (ref 80–99)
MONOCYTES # BLD AUTO: 0.4 10*3/MM3 (ref 0–1)
MONOCYTES NFR BLD AUTO: 8.3 % (ref 0–12)
NEUTROPHILS # BLD AUTO: 3 10*3/MM3 (ref 1.5–8.3)
NEUTROPHILS NFR BLD AUTO: 60.4 % (ref 41–71)
PLATELET # BLD AUTO: 291 10*3/MM3 (ref 150–450)
PMV BLD AUTO: 7.5 FL (ref 6–12)
POTASSIUM BLD-SCNC: 3.9 MMOL/L (ref 3.5–5.5)
PROT SERPL-MCNC: 5.7 G/DL (ref 5.7–8.2)
RBC # BLD AUTO: 3.28 10*6/MM3 (ref 3.89–5.14)
SODIUM BLD-SCNC: 134 MMOL/L (ref 132–146)
WBC NRBC COR # BLD: 4.9 10*3/MM3 (ref 3.5–10.8)

## 2018-05-15 PROCEDURE — 96413 CHEMO IV INFUSION 1 HR: CPT

## 2018-05-15 PROCEDURE — 25010000002 DIPHENHYDRAMINE PER 50 MG: Performed by: INTERNAL MEDICINE

## 2018-05-15 PROCEDURE — 25010000002 PACLITAXEL PER 30 MG: Performed by: INTERNAL MEDICINE

## 2018-05-15 PROCEDURE — 85025 COMPLETE CBC W/AUTO DIFF WBC: CPT

## 2018-05-15 PROCEDURE — 80053 COMPREHEN METABOLIC PANEL: CPT

## 2018-05-15 PROCEDURE — 96367 TX/PROPH/DG ADDL SEQ IV INF: CPT

## 2018-05-15 PROCEDURE — 25010000002 DEXAMETHASONE PER 1 MG: Performed by: INTERNAL MEDICINE

## 2018-05-15 PROCEDURE — 96375 TX/PRO/DX INJ NEW DRUG ADDON: CPT

## 2018-05-15 PROCEDURE — 25010000002 HEPARIN FLUSH (PORCINE) 100 UNIT/ML SOLUTION: Performed by: INTERNAL MEDICINE

## 2018-05-15 PROCEDURE — 96368 THER/DIAG CONCURRENT INF: CPT

## 2018-05-15 RX ORDER — SODIUM CHLORIDE 0.9 % (FLUSH) 0.9 %
10 SYRINGE (ML) INJECTION AS NEEDED
Status: CANCELLED | OUTPATIENT
Start: 2018-05-15

## 2018-05-15 RX ORDER — SODIUM CHLORIDE 9 MG/ML
250 INJECTION, SOLUTION INTRAVENOUS ONCE
Status: COMPLETED | OUTPATIENT
Start: 2018-05-15 | End: 2018-05-15

## 2018-05-15 RX ORDER — FAMOTIDINE 10 MG/ML
20 INJECTION, SOLUTION INTRAVENOUS ONCE
Status: COMPLETED | OUTPATIENT
Start: 2018-05-15 | End: 2018-05-15

## 2018-05-15 RX ORDER — SODIUM CHLORIDE 0.9 % (FLUSH) 0.9 %
10 SYRINGE (ML) INJECTION AS NEEDED
Status: DISCONTINUED | OUTPATIENT
Start: 2018-05-15 | End: 2018-05-15 | Stop reason: HOSPADM

## 2018-05-15 RX ADMIN — SODIUM CHLORIDE 250 ML: 9 INJECTION, SOLUTION INTRAVENOUS at 09:06

## 2018-05-15 RX ADMIN — HEPARIN 500 UNITS: 100 SYRINGE at 10:37

## 2018-05-15 RX ADMIN — DEXAMETHASONE SODIUM PHOSPHATE 12 MG: 4 INJECTION, SOLUTION INTRAMUSCULAR; INTRAVENOUS at 09:06

## 2018-05-15 RX ADMIN — Medication 10 ML: at 10:37

## 2018-05-15 RX ADMIN — DIPHENHYDRAMINE HYDROCHLORIDE 25 MG: 50 INJECTION INTRAMUSCULAR; INTRAVENOUS at 09:06

## 2018-05-15 RX ADMIN — PACLITAXEL 120 MG: 6 INJECTION, SOLUTION INTRAVENOUS at 09:31

## 2018-05-15 RX ADMIN — FAMOTIDINE 20 MG: 10 INJECTION INTRAVENOUS at 09:07

## 2018-05-22 ENCOUNTER — INFUSION (OUTPATIENT)
Dept: ONCOLOGY | Facility: HOSPITAL | Age: 43
End: 2018-05-22

## 2018-05-22 ENCOUNTER — OFFICE VISIT (OUTPATIENT)
Dept: ONCOLOGY | Facility: CLINIC | Age: 43
End: 2018-05-22

## 2018-05-22 VITALS
BODY MASS INDEX: 30.76 KG/M2 | HEIGHT: 67 IN | SYSTOLIC BLOOD PRESSURE: 115 MMHG | RESPIRATION RATE: 16 BRPM | WEIGHT: 196 LBS | HEART RATE: 124 BPM | TEMPERATURE: 97.2 F | DIASTOLIC BLOOD PRESSURE: 81 MMHG

## 2018-05-22 VITALS — SYSTOLIC BLOOD PRESSURE: 106 MMHG | DIASTOLIC BLOOD PRESSURE: 73 MMHG | HEART RATE: 100 BPM

## 2018-05-22 DIAGNOSIS — C50.911 MALIGNANT NEOPLASM OF RIGHT BREAST IN FEMALE, ESTROGEN RECEPTOR NEGATIVE, UNSPECIFIED SITE OF BREAST (HCC): Primary | ICD-10-CM

## 2018-05-22 DIAGNOSIS — C50.911 MALIGNANT NEOPLASM OF RIGHT BREAST IN FEMALE, ESTROGEN RECEPTOR NEGATIVE, UNSPECIFIED SITE OF BREAST (HCC): ICD-10-CM

## 2018-05-22 DIAGNOSIS — Z17.1 MALIGNANT NEOPLASM OF AREOLA OF RIGHT BREAST IN FEMALE, ESTROGEN RECEPTOR NEGATIVE (HCC): ICD-10-CM

## 2018-05-22 DIAGNOSIS — Z17.1 MALIGNANT NEOPLASM OF RIGHT BREAST IN FEMALE, ESTROGEN RECEPTOR NEGATIVE, UNSPECIFIED SITE OF BREAST (HCC): Primary | ICD-10-CM

## 2018-05-22 DIAGNOSIS — C50.011 MALIGNANT NEOPLASM OF AREOLA OF RIGHT BREAST IN FEMALE, ESTROGEN RECEPTOR NEGATIVE (HCC): ICD-10-CM

## 2018-05-22 DIAGNOSIS — C50.011 MALIGNANT NEOPLASM OF AREOLA OF RIGHT BREAST IN FEMALE, ESTROGEN RECEPTOR NEGATIVE (HCC): Primary | ICD-10-CM

## 2018-05-22 DIAGNOSIS — Z17.1 MALIGNANT NEOPLASM OF AREOLA OF RIGHT BREAST IN FEMALE, ESTROGEN RECEPTOR NEGATIVE (HCC): Primary | ICD-10-CM

## 2018-05-22 DIAGNOSIS — Z17.1 MALIGNANT NEOPLASM OF RIGHT BREAST IN FEMALE, ESTROGEN RECEPTOR NEGATIVE, UNSPECIFIED SITE OF BREAST (HCC): ICD-10-CM

## 2018-05-22 LAB
ALBUMIN SERPL-MCNC: 3.9 G/DL (ref 3.2–4.8)
ALBUMIN/GLOB SERPL: 1.9 G/DL (ref 1.5–2.5)
ALP SERPL-CCNC: 69 U/L (ref 25–100)
ALT SERPL W P-5'-P-CCNC: 60 U/L (ref 7–40)
ANION GAP SERPL CALCULATED.3IONS-SCNC: 16 MMOL/L (ref 3–11)
AST SERPL-CCNC: 39 U/L (ref 0–33)
BILIRUB SERPL-MCNC: 0.3 MG/DL (ref 0.3–1.2)
BUN BLD-MCNC: 9 MG/DL (ref 9–23)
BUN/CREAT SERPL: 10 (ref 7–25)
CALCIUM SPEC-SCNC: 9 MG/DL (ref 8.7–10.4)
CHLORIDE SERPL-SCNC: 96 MMOL/L (ref 99–109)
CO2 SERPL-SCNC: 17 MMOL/L (ref 20–31)
CREAT BLD-MCNC: 0.9 MG/DL (ref 0.6–1.3)
ERYTHROCYTE [DISTWIDTH] IN BLOOD BY AUTOMATED COUNT: 15.1 % (ref 11.3–14.5)
GFR SERPL CREATININE-BSD FRML MDRD: 69 ML/MIN/1.73
GLOBULIN UR ELPH-MCNC: 2.1 GM/DL
GLUCOSE BLD-MCNC: 105 MG/DL (ref 70–100)
HCT VFR BLD AUTO: 31 % (ref 34.5–44)
HGB BLD-MCNC: 11.9 G/DL (ref 11.5–15.5)
LYMPHOCYTES # BLD AUTO: 1.5 10*3/MM3 (ref 0.6–4.8)
LYMPHOCYTES NFR BLD AUTO: 25.7 % (ref 24–44)
MCH RBC QN AUTO: 37.7 PG (ref 27–31)
MCHC RBC AUTO-ENTMCNC: 38.4 G/DL (ref 32–36)
MCV RBC AUTO: 98 FL (ref 80–99)
MONOCYTES # BLD AUTO: 0.3 10*3/MM3 (ref 0–1)
MONOCYTES NFR BLD AUTO: 4.4 % (ref 0–12)
NEUTROPHILS # BLD AUTO: 4.2 10*3/MM3 (ref 1.5–8.3)
NEUTROPHILS NFR BLD AUTO: 69.9 % (ref 41–71)
PLATELET # BLD AUTO: 280 10*3/MM3 (ref 150–450)
PMV BLD AUTO: 7.4 FL (ref 6–12)
POTASSIUM BLD-SCNC: 4.1 MMOL/L (ref 3.5–5.5)
PROT SERPL-MCNC: 6 G/DL (ref 5.7–8.2)
RBC # BLD AUTO: 3.16 10*6/MM3 (ref 3.89–5.14)
SODIUM BLD-SCNC: 129 MMOL/L (ref 132–146)
WBC NRBC COR # BLD: 6 10*3/MM3 (ref 3.5–10.8)

## 2018-05-22 PROCEDURE — 96361 HYDRATE IV INFUSION ADD-ON: CPT

## 2018-05-22 PROCEDURE — 25010000002 PACLITAXEL PER 30 MG: Performed by: INTERNAL MEDICINE

## 2018-05-22 PROCEDURE — 96413 CHEMO IV INFUSION 1 HR: CPT

## 2018-05-22 PROCEDURE — 25010000002 HEPARIN FLUSH (PORCINE) 100 UNIT/ML SOLUTION: Performed by: INTERNAL MEDICINE

## 2018-05-22 PROCEDURE — 25010000002 DIPHENHYDRAMINE PER 50 MG: Performed by: INTERNAL MEDICINE

## 2018-05-22 PROCEDURE — 96375 TX/PRO/DX INJ NEW DRUG ADDON: CPT

## 2018-05-22 PROCEDURE — 99214 OFFICE O/P EST MOD 30 MIN: CPT | Performed by: INTERNAL MEDICINE

## 2018-05-22 PROCEDURE — 80053 COMPREHEN METABOLIC PANEL: CPT

## 2018-05-22 PROCEDURE — 85025 COMPLETE CBC W/AUTO DIFF WBC: CPT

## 2018-05-22 PROCEDURE — 25010000002 DEXAMETHASONE PER 1 MG: Performed by: INTERNAL MEDICINE

## 2018-05-22 RX ORDER — SODIUM CHLORIDE 0.9 % (FLUSH) 0.9 %
10 SYRINGE (ML) INJECTION AS NEEDED
Status: DISCONTINUED | OUTPATIENT
Start: 2018-05-22 | End: 2018-05-22 | Stop reason: HOSPADM

## 2018-05-22 RX ORDER — HEPARIN SODIUM (PORCINE) LOCK FLUSH IV SOLN 100 UNIT/ML 100 UNIT/ML
300 SOLUTION INTRAVENOUS ONCE
OUTPATIENT
Start: 2018-05-22

## 2018-05-22 RX ORDER — TRAMADOL HYDROCHLORIDE 50 MG/1
50 TABLET ORAL EVERY 6 HOURS PRN
Qty: 20 TABLET | Refills: 0 | OUTPATIENT
Start: 2018-05-22 | End: 2018-06-05 | Stop reason: SDUPTHER

## 2018-05-22 RX ORDER — FAMOTIDINE 10 MG/ML
20 INJECTION, SOLUTION INTRAVENOUS ONCE
Status: CANCELLED | OUTPATIENT
Start: 2018-05-29

## 2018-05-22 RX ORDER — FAMOTIDINE 10 MG/ML
20 INJECTION, SOLUTION INTRAVENOUS ONCE
Status: COMPLETED | OUTPATIENT
Start: 2018-05-22 | End: 2018-05-22

## 2018-05-22 RX ORDER — SODIUM CHLORIDE 9 MG/ML
1000 INJECTION, SOLUTION INTRAVENOUS ONCE
Status: COMPLETED | OUTPATIENT
Start: 2018-05-22 | End: 2018-05-22

## 2018-05-22 RX ORDER — FAMOTIDINE 10 MG/ML
20 INJECTION, SOLUTION INTRAVENOUS ONCE
Status: CANCELLED | OUTPATIENT
Start: 2018-05-22

## 2018-05-22 RX ORDER — SODIUM CHLORIDE 9 MG/ML
250 INJECTION, SOLUTION INTRAVENOUS ONCE
Status: CANCELLED | OUTPATIENT
Start: 2018-05-29

## 2018-05-22 RX ORDER — HEPARIN SODIUM (PORCINE) LOCK FLUSH IV SOLN 100 UNIT/ML 100 UNIT/ML
500 SOLUTION INTRAVENOUS AS NEEDED
OUTPATIENT
Start: 2018-05-22

## 2018-05-22 RX ORDER — SODIUM CHLORIDE 9 MG/ML
1000 INJECTION, SOLUTION INTRAVENOUS ONCE
Status: CANCELLED
Start: 2018-05-22 | End: 2018-05-22

## 2018-05-22 RX ORDER — SODIUM CHLORIDE 9 MG/ML
250 INJECTION, SOLUTION INTRAVENOUS ONCE
Status: CANCELLED | OUTPATIENT
Start: 2018-05-22

## 2018-05-22 RX ORDER — SODIUM CHLORIDE 0.9 % (FLUSH) 0.9 %
10 SYRINGE (ML) INJECTION AS NEEDED
OUTPATIENT
Start: 2018-05-22

## 2018-05-22 RX ORDER — SODIUM CHLORIDE 9 MG/ML
250 INJECTION, SOLUTION INTRAVENOUS ONCE
Status: COMPLETED | OUTPATIENT
Start: 2018-05-22 | End: 2018-05-22

## 2018-05-22 RX ADMIN — SODIUM CHLORIDE 1000 ML: 9 INJECTION, SOLUTION INTRAVENOUS at 09:36

## 2018-05-22 RX ADMIN — DIPHENHYDRAMINE HYDROCHLORIDE 25 MG: 50 INJECTION INTRAMUSCULAR; INTRAVENOUS at 09:42

## 2018-05-22 RX ADMIN — SODIUM CHLORIDE 250 ML: 9 INJECTION, SOLUTION INTRAVENOUS at 09:42

## 2018-05-22 RX ADMIN — PACLITAXEL 75 MG: 6 INJECTION, SOLUTION INTRAVENOUS at 10:05

## 2018-05-22 RX ADMIN — DEXAMETHASONE SODIUM PHOSPHATE 12 MG: 4 INJECTION, SOLUTION INTRAMUSCULAR; INTRAVENOUS at 09:42

## 2018-05-22 RX ADMIN — Medication 20 ML: at 11:21

## 2018-05-22 RX ADMIN — FAMOTIDINE 20 MG: 10 INJECTION INTRAVENOUS at 09:42

## 2018-05-22 RX ADMIN — SODIUM CHLORIDE, PRESERVATIVE FREE 500 UNITS: 5 INJECTION INTRAVENOUS at 11:21

## 2018-05-22 NOTE — PROGRESS NOTES
"      PROBLEM LIST:  1. 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  Biopsy showed high grade invasive ductal carcinoma, ER negative, OR negative, HER-2 negative.  The mass on imaging measures approximately 9.7 cm.  Enlarged abnormal lymph nodes are present on imaging.  FNA of 1 axillary lymph node was negative for malignancy.  PET/CT on 1/12/18 showed large necrotic right breast mass, 2 pathologically enlarged and hypermetabolic intramammary nodes, and no evidence of distant disease.  Breast MRI on 1/17/18 showed evidence of pectoral muscle involvement, as well as several satellite lesions.  B) neoadjuvant ddAC followed by weekly taxol started on 1/23/17.  2.  Hypertension  3.  Anxiety  4.  Bell's palsy    Subjective     HISTORY OF PRESENT ILLNESS:   Estefany Bryan returns today for follow-up.  She is feeling more tired.  This last week has been rough.  She feels aching in her hips and in her armpits.  She notices that her thinking is not as clear.  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    Past Medical History, Past Surgical History, Social History, Family History have been reviewed and are without significant changes except as mentioned.    Review of Systems   A comprehensive 14 point review of systems was performed and was negative except as mentioned.    Medications:  The current medication list was reviewed in the EMR    ALLERGIES:    Allergies   Allergen Reactions   • Chlorhexidine Other (See Comments)     Pt. developed redness and burning sensation after using.   • Codeine GI Intolerance       Objective      /81   Pulse (!) 124   Temp 97.2 °F (36.2 °C)   Resp 16   Ht 170.2 cm (67\")   Wt 88.9 kg (196 lb)   BMI 30.70 kg/m²      Performance Status: 0    General: well appearing female in no acute distress  Neuro: alert and " oriented  HEENT: sclera anicteric, oropharynx clear  Lymphatics: no cervical, supraclavicular adenopathy. No palpable axillary adenopathy  Breast: In the right breast there is an upper outer quadrant mass which is approximately 4 cm in greatest dimension, continued improvement.    Cardiovascular: regular rate and rhythm, no murmurs  Lungs: clear to auscultation bilaterally  Abdomen: soft, nontender, nondistended.  No palpable organomegaly  Extremeties: no lower extremity edema  Skin: no rashes, lesions, bruising, or petechiae  Psych: mood and affect appropriate          Assessment/Plan   Estefany Bryan is a 42 y.o. year old female with [id number] triple negative breast cancer who returns for follow up on weekly taxol.  Her neuropathy is now grade 2-3.  I'm going to reduce her dose of paclitaxel to 50% for the remainder of the treatment.  If her neuropathy worsens any more from this point I want her to call me and we will stop treatment early.    We discussed the neuropathy can continue to worsen for up to 2 months out from the final dose.  We also discussed that her focus and memory aren't likely affected by her treatment as well as stress and fatigue.  I do anticipate that this will improve after her treatment is complete, but she is not able to work doing her job at this point.    Once she has completed her chemotherapy she will see Dr. Adeel Powers to discuss surgery, after surgery she will need radiation treatment.    Follow-up in 2 weeks.                  Visit time was 25 minutes, greater than 50% spent in counseling      Winsome Romano MD  [facility] Hematology and Oncology    5/22/2018          CC:

## 2018-05-25 ENCOUNTER — TELEPHONE (OUTPATIENT)
Dept: ONCOLOGY | Facility: CLINIC | Age: 43
End: 2018-05-25

## 2018-05-25 NOTE — TELEPHONE ENCOUNTER
----- Message from Radha Martinez MA sent at 5/25/2018 11:18 AM EDT -----  Regarding: david- elevated HR  Contact: [phone]  Pt states her pulse has been 100 or greater for the entire week. Pt's pulse is currently at 101. She is wondering if she should still plan on getting taxol on Tuesday?    Please call to discuss

## 2018-05-25 NOTE — TELEPHONE ENCOUNTER
Attempted to call patient back, left VM asking her to call me back.     Wanted to get a little more information about her elevated HR this week, s/a: how high has it been running, is she experiencing any accompanying symptoms when her HR is high, do any activities provoke the increased hr, is she otherwise feeling ok, how has bp been running in comparison to elevated hr, has she started any new medications that could cause increased hr.    If she is otherwise ok and it has not been higher than 105-110ish, we will likely proceed with treatment as scheduled. I will double check this with Dr. Romano before telling the patient.

## 2018-05-29 ENCOUNTER — APPOINTMENT (OUTPATIENT)
Dept: ONCOLOGY | Facility: HOSPITAL | Age: 43
End: 2018-05-29

## 2018-05-29 ENCOUNTER — TELEPHONE (OUTPATIENT)
Dept: ONCOLOGY | Facility: CLINIC | Age: 43
End: 2018-05-29

## 2018-05-29 DIAGNOSIS — R00.0 TACHYCARDIA: Primary | ICD-10-CM

## 2018-05-29 NOTE — TELEPHONE ENCOUNTER
----- Message from Darcie Obando sent at 5/29/2018 12:55 PM EDT -----  Regarding: ERIC - CANCELED TREATMENT THIS MORNING   Contact: [phone]  PATIENT CALLED SECOND TIME, SHE HASN'T GOTTEN A CALL BACK YET.     PATIENT CALLED AND SAID HER HEART RATE HAS NOT WENT DOWN. IT HAS STAYED IN THE HIGH 90'S AND AS HIGH  ALL LAST WEEK.     SHE CANCELED HER TREATMENT TODAY BECAUSE SHE DOESN'T WANT HEART DAMAGE.

## 2018-05-29 NOTE — TELEPHONE ENCOUNTER
Spoke with patient. HR has been running between 90-120s consistently since May 22nd. No other accompanying symptoms. Happens both at rest and with activity. Dr. Romano aware and will call the patient after clinic today.

## 2018-05-30 ENCOUNTER — HOSPITAL ENCOUNTER (OUTPATIENT)
Dept: CARDIOLOGY | Facility: HOSPITAL | Age: 43
Discharge: HOME | End: 2018-05-30

## 2018-05-31 ENCOUNTER — TELEPHONE (OUTPATIENT)
Dept: ONCOLOGY | Facility: CLINIC | Age: 43
End: 2018-05-31

## 2018-05-31 RX ORDER — METOPROLOL SUCCINATE 100 MG/1
100 TABLET, EXTENDED RELEASE ORAL DAILY
Qty: 30 TABLET | Refills: 5 | Status: SHIPPED | OUTPATIENT
Start: 2018-05-31 | End: 2018-06-05

## 2018-05-31 NOTE — TELEPHONE ENCOUNTER
Spoke with Estefany. Let her know Dr. Romano reviewed her ekg, which showed a normal heart rhythm and fast rate. Dr. Romano would like for her to increase Toprol XL from 50mg to 100 mg daily. I will send new Rx to [facility]. Instructed her to continue keeping a log at home of heart rate and bp readings.

## 2018-06-01 NOTE — PROGRESS NOTES
PROBLEM LIST:  1. [id number] (stage IIIC) triple negative IDC of the right breast  A) patient presented with a enlarging mass in the right breast for 5-6 months associated with shooting pain.  Biopsy showed high grade invasive ductal carcinoma, ER negative, [place] negative, HER-2 negative.  The mass on imaging measures approximately 9.7 cm.  Enlarged abnormal lymph nodes are present on imaging.  FNA of 1 axillary lymph node was negative for malignancy.  PET/CT on 1/12/18 showed large necrotic right breast mass, 2 pathologically enlarged and hypermetabolic intramammary nodes, and no evidence of distant disease.  Breast MRI on 1/17/18 showed evidence of pectoral muscle involvement, as well as several satellite lesions.  B) neoadjuvant ddAC followed by weekly taxol started on 1/23/17.  2.  Hypertension  3.  Anxiety  4.  Bell's palsy    Subjective     HISTORY OF PRESENT ILLNESS:   Estefany Bryan returns today for follow-up.  Last week she cancelled her treatment because of concerns about her heart rate.  She was persistently tachycardic.  EKG showed sinus tach.  She has been taking toprol xl 100 mg.  with the increased dose there has not been much improvement in her heart rate.  It is still generally greater than 100.  She does not have many symptoms with this.  However she is very concerned about the heart rate and says she does not want to continue chemotherapy treatment.  Her neuropathy symptoms are about the same.    Past Medical History, Past Surgical History, Social History, Family History have been reviewed and are without significant changes except as mentioned.    Review of Systems   A comprehensive 14 point review of systems was performed and was negative except as mentioned.    Medications:  The current medication list was reviewed in the EMR    ALLERGIES:    Allergies   Allergen Reactions   • Chlorhexidine Other (See Comments)     Pt. developed redness and burning sensation after using.   • Codeine GI  "Intolerance       Objective      /82 Comment: RUE  Pulse 114   Temp 97.7 °F (36.5 °C) (Temporal Artery )   Resp 16   Ht 170.2 cm (67\")   Wt 90.7 kg (200 lb)   BMI 31.32 kg/m²      Performance Status: 0    General: well appearing female in no acute distress  Neuro: alert and oriented  HEENT: sclera anicteric, oropharynx clear  Lymphatics: no cervical, supraclavicular adenopathy. No palpable axillary adenopathy  Breast: In the right breast there is an upper outer quadrant mass which is approximately 3.5 cm in greatest dimension, continued improvement.    Cardiovascular: regular rate and rhythm, no murmurs  Lungs: clear to auscultation bilaterally  Abdomen: soft, nontender, nondistended.  No palpable organomegaly  Extremeties: no lower extremity edema  Skin: no rashes, lesions, bruising, or petechiae  Psych: mood and affect appropriate          Assessment/Plan   Estefany Bryan is a 42 y.o. year old female with [id number] triple negative breast cancer who returns for follow up on weekly taxol.  Neuropathy symptoms are stable.    She has developed tachycardia which has continued despite an increase in her beta blocker dose.  I did discuss this with Dr. Powell of cardiology.  He suggested changing to metoprolol tartrate 100 mg twice a day, and also repeating an echocardiogram which we will do.  I told Estefany I thought it was reasonable to continue with the final 2 doses of treatment while we work on controlling her heart rate and evaluate her cardiac function.  However she was not comfortable with doing this.  She completed 10 out of 12 doses of Taxol.  I will go ahead and refer her to see Dr. Adeel Powers again to discuss surgical planning.  We also discussed that we have a clinical trial that is looking at adding additional immunotherapy treatment after completion of neoadjuvant chemotherapy, if there is still residual disease.  For this reason, it may be reasonable to leave her port in place until after we " know her final pathology results and decide our next steps.    If her heart rate does not improve with the above intervention, I will plan to refer her to see Dr. Powell.    Follow-up in 6 weeks.                  Visit time was 25 minutes, greater than 50% spent in counseling      Winsome Romano MD  [facility] Hematology and Oncology    6/5/2018          CC:

## 2018-06-05 ENCOUNTER — OFFICE VISIT (OUTPATIENT)
Dept: ONCOLOGY | Facility: CLINIC | Age: 43
End: 2018-06-05

## 2018-06-05 ENCOUNTER — APPOINTMENT (OUTPATIENT)
Dept: ONCOLOGY | Facility: HOSPITAL | Age: 43
End: 2018-06-05

## 2018-06-05 VITALS
WEIGHT: 200 LBS | DIASTOLIC BLOOD PRESSURE: 82 MMHG | RESPIRATION RATE: 16 BRPM | HEIGHT: 67 IN | BODY MASS INDEX: 31.39 KG/M2 | SYSTOLIC BLOOD PRESSURE: 111 MMHG | HEART RATE: 114 BPM | TEMPERATURE: 97.7 F

## 2018-06-05 DIAGNOSIS — Z17.1 MALIGNANT NEOPLASM OF RIGHT BREAST IN FEMALE, ESTROGEN RECEPTOR NEGATIVE, UNSPECIFIED SITE OF BREAST (HCC): Primary | ICD-10-CM

## 2018-06-05 DIAGNOSIS — C50.911 MALIGNANT NEOPLASM OF RIGHT BREAST IN FEMALE, ESTROGEN RECEPTOR NEGATIVE, UNSPECIFIED SITE OF BREAST (HCC): Primary | ICD-10-CM

## 2018-06-05 PROCEDURE — 99214 OFFICE O/P EST MOD 30 MIN: CPT | Performed by: INTERNAL MEDICINE

## 2018-06-05 RX ORDER — TRAMADOL HYDROCHLORIDE 50 MG/1
50 TABLET ORAL EVERY 6 HOURS PRN
Qty: 20 TABLET | Refills: 0 | Status: SHIPPED | OUTPATIENT
Start: 2018-06-05 | End: 2018-11-27

## 2018-06-05 RX ORDER — METOPROLOL TARTRATE 100 MG/1
100 TABLET ORAL 2 TIMES DAILY
Qty: 60 TABLET | Refills: 2 | Status: SHIPPED | OUTPATIENT
Start: 2018-06-05 | End: 2018-11-27 | Stop reason: ALTCHOICE

## 2018-06-06 ENCOUNTER — HOSPITAL ENCOUNTER (OUTPATIENT)
Dept: CARDIOLOGY | Facility: HOSPITAL | Age: 43
Discharge: HOME | End: 2018-06-06
Attending: INTERNAL MEDICINE | Admitting: INTERNAL MEDICINE

## 2018-06-06 VITALS
BODY MASS INDEX: 31.14 KG/M2 | WEIGHT: 198.41 LBS | SYSTOLIC BLOOD PRESSURE: 101 MMHG | DIASTOLIC BLOOD PRESSURE: 82 MMHG | HEIGHT: 67 IN

## 2018-06-06 DIAGNOSIS — C50.911 MALIGNANT NEOPLASM OF RIGHT BREAST IN FEMALE, ESTROGEN RECEPTOR NEGATIVE, UNSPECIFIED SITE OF BREAST (HCC): ICD-10-CM

## 2018-06-06 DIAGNOSIS — Z17.1 MALIGNANT NEOPLASM OF RIGHT BREAST IN FEMALE, ESTROGEN RECEPTOR NEGATIVE, UNSPECIFIED SITE OF BREAST (HCC): ICD-10-CM

## 2018-06-06 PROCEDURE — 25010000002 SULFUR HEXAFLUORIDE MICROSPH 60.7-25 MG RECONSTITUTED SUSPENSION: Performed by: INTERNAL MEDICINE

## 2018-06-06 PROCEDURE — 93306 TTE W/DOPPLER COMPLETE: CPT | Performed by: INTERNAL MEDICINE

## 2018-06-06 PROCEDURE — 93306 TTE W/DOPPLER COMPLETE: CPT

## 2018-06-06 RX ADMIN — SULFUR HEXAFLUORIDE 3 ML: KIT at 09:40

## 2018-06-07 ENCOUNTER — TELEPHONE (OUTPATIENT)
Dept: ONCOLOGY | Facility: CLINIC | Age: 43
End: 2018-06-07

## 2018-06-07 DIAGNOSIS — R00.0 TACHYCARDIA: Primary | ICD-10-CM

## 2018-06-07 LAB
BH CV ECHO MEAS - AO ROOT AREA (BSA CORRECTED): 1.9
BH CV ECHO MEAS - AO ROOT AREA: 11.3 CM^2
BH CV ECHO MEAS - AO ROOT DIAM: 3.8 CM
BH CV ECHO MEAS - BSA(HAYCOCK): 2.1 M^2
BH CV ECHO MEAS - BSA: 2 M^2
BH CV ECHO MEAS - BZI_BMI: 31.3 KILOGRAMS/M^2
BH CV ECHO MEAS - BZI_METRIC_HEIGHT: 170.2 CM
BH CV ECHO MEAS - BZI_METRIC_WEIGHT: 90.7 KG
BH CV ECHO MEAS - CONTRAST EF (2CH): 50 ML/M^2
BH CV ECHO MEAS - CONTRAST EF 4CH: 41.9 ML/M^2
BH CV ECHO MEAS - EDV(CUBED): 49.8 ML
BH CV ECHO MEAS - EDV(MOD-SP2): 44 ML
BH CV ECHO MEAS - EDV(MOD-SP4): 43 ML
BH CV ECHO MEAS - EDV(TEICH): 57.4 ML
BH CV ECHO MEAS - EF(CUBED): 62.7 %
BH CV ECHO MEAS - EF(MOD-BP): 45 %
BH CV ECHO MEAS - EF(MOD-SP2): 50 %
BH CV ECHO MEAS - EF(MOD-SP4): 41.9 %
BH CV ECHO MEAS - EF(TEICH): 55 %
BH CV ECHO MEAS - ESV(CUBED): 18.6 ML
BH CV ECHO MEAS - ESV(MOD-SP2): 22 ML
BH CV ECHO MEAS - ESV(MOD-SP4): 25 ML
BH CV ECHO MEAS - ESV(TEICH): 25.8 ML
BH CV ECHO MEAS - FS: 28 %
BH CV ECHO MEAS - IVS/LVPW: 1.9
BH CV ECHO MEAS - IVSD: 1.3 CM
BH CV ECHO MEAS - LA DIMENSION: 2.9 CM
BH CV ECHO MEAS - LA/AO: 0.76
BH CV ECHO MEAS - LV DIASTOLIC VOL/BSA (35-75): 21.3 ML/M^2
BH CV ECHO MEAS - LV MASS(C)D: 113 GRAMS
BH CV ECHO MEAS - LV MASS(C)DI: 55.9 GRAMS/M^2
BH CV ECHO MEAS - LV MAX PG: 2.1 MMHG
BH CV ECHO MEAS - LV MEAN PG: 1 MMHG
BH CV ECHO MEAS - LV SYSTOLIC VOL/BSA (12-30): 12.4 ML/M^2
BH CV ECHO MEAS - LV V1 MAX: 72.9 CM/SEC
BH CV ECHO MEAS - LV V1 MEAN: 44.2 CM/SEC
BH CV ECHO MEAS - LV V1 VTI: 12.2 CM
BH CV ECHO MEAS - LVIDD: 3.7 CM
BH CV ECHO MEAS - LVIDS: 2.7 CM
BH CV ECHO MEAS - LVLD AP2: 6.2 CM
BH CV ECHO MEAS - LVLD AP4: 6.5 CM
BH CV ECHO MEAS - LVLS AP2: 5.4 CM
BH CV ECHO MEAS - LVLS AP4: 5.9 CM
BH CV ECHO MEAS - LVOT AREA (M): 4.5 CM^2
BH CV ECHO MEAS - LVOT AREA: 4.5 CM^2
BH CV ECHO MEAS - LVOT DIAM: 2.4 CM
BH CV ECHO MEAS - LVPWD: 0.71 CM
BH CV ECHO MEAS - MV A MAX VEL: 72.1 CM/SEC
BH CV ECHO MEAS - MV DEC SLOPE: 123 CM/SEC^2
BH CV ECHO MEAS - MV E MAX VEL: 57.8 CM/SEC
BH CV ECHO MEAS - MV E/A: 0.8
BH CV ECHO MEAS - MV P1/2T MAX VEL: 57.3 CM/SEC
BH CV ECHO MEAS - MV P1/2T: 136.4 MSEC
BH CV ECHO MEAS - MVA P1/2T LCG: 3.8 CM^2
BH CV ECHO MEAS - MVA(P1/2T): 1.6 CM^2
BH CV ECHO MEAS - PA ACC SLOPE: 748 CM/SEC^2
BH CV ECHO MEAS - PA ACC TIME: 0.11 SEC
BH CV ECHO MEAS - PA PR(ACCEL): 30.4 MMHG
BH CV ECHO MEAS - RVDD: 3 CM
BH CV ECHO MEAS - SI(CUBED): 15.4 ML/M^2
BH CV ECHO MEAS - SI(LVOT): 27.3 ML/M^2
BH CV ECHO MEAS - SI(MOD-SP2): 10.9 ML/M^2
BH CV ECHO MEAS - SI(MOD-SP4): 8.9 ML/M^2
BH CV ECHO MEAS - SI(TEICH): 15.6 ML/M^2
BH CV ECHO MEAS - SV(CUBED): 31.2 ML
BH CV ECHO MEAS - SV(LVOT): 55.2 ML
BH CV ECHO MEAS - SV(MOD-SP2): 22 ML
BH CV ECHO MEAS - SV(MOD-SP4): 18 ML
BH CV ECHO MEAS - SV(TEICH): 31.6 ML
BH CV VAS BP LEFT ARM: NORMAL MMHG
LV EF 2D ECHO EST: 45 %

## 2018-06-07 NOTE — TELEPHONE ENCOUNTER
Called jamila re: echo results. This shows a slightly decrease in EF compared to pre-treatment echo.  Discussed with dr. Powell - will refer her to see Dr. Powell to review meds and discuss management.  Discussed with jamila that adriamycin chemotherapy can affect heart function, but I am hopeful this can improve with medical management.

## 2018-06-14 ENCOUNTER — CONSULT (OUTPATIENT)
Dept: CARDIOLOGY | Facility: CLINIC | Age: 43
End: 2018-06-14

## 2018-06-14 VITALS
HEART RATE: 128 BPM | SYSTOLIC BLOOD PRESSURE: 106 MMHG | WEIGHT: 201.2 LBS | DIASTOLIC BLOOD PRESSURE: 70 MMHG | BODY MASS INDEX: 29.8 KG/M2 | HEIGHT: 69 IN

## 2018-06-14 DIAGNOSIS — R00.0 SINUS TACHYCARDIA: Primary | ICD-10-CM

## 2018-06-14 PROBLEM — Z01.810 PREOPERATIVE CARDIOVASCULAR EXAMINATION: Status: ACTIVE | Noted: 2018-06-14

## 2018-06-14 PROCEDURE — 99244 OFF/OP CNSLTJ NEW/EST MOD 40: CPT | Performed by: INTERNAL MEDICINE

## 2018-06-14 PROCEDURE — 93000 ELECTROCARDIOGRAM COMPLETE: CPT | Performed by: INTERNAL MEDICINE

## 2018-06-14 RX ORDER — EAR PLUGS
5000 EACH OTIC (EAR) AS NEEDED
COMMUNITY
End: 2020-06-11

## 2018-06-14 NOTE — PROGRESS NOTES
Encounter Date:06/14/2018    Patient ID: Estefany Bryan is a 42 y.o. female who resides in [facility]/Reason for visit:  Rapid Heart Rate (due to Taxol)            Estefany Bryan is referred to me by Winsome Romano evaluation of sinus tachycardia and mild LV systolic dysfunction which has developed in the setting of chemotherapy for breast cancer. The patient is a 42-year-old female who in January was found to have receptor negative breast cancer. She has been undergoing chemotherapy with paclitaxel with a excellent clinical response. After initiation of chemotherapy, however, her heart rate has been persistently elevated. Several EKGs have been performed which I'll demonstrate sinus tachycardia. She states that her resting heart rate now that she is starting metoprolol is in the range of  bpm. The patient states that she can feel her heart racing particularly when she tries to do something physical. She denies having a history of arrhythmia or resting sinus tachycardia in the past. She denies any exertional chest pain to suggest angina. She underwent an echocardiogram due to these symptoms and her LV systolic function appeared mildly reduced or low normal with an EF of 45%.  She plans to undergo bilateral mastectomy with Dr. SOUZA in the near future.    Review of Systems   Constitution: Positive for diaphoresis, malaise/fatigue, night sweats and weight gain.   Cardiovascular: Positive for irregular heartbeat.   Respiratory: Positive for snoring.    Musculoskeletal: Positive for back pain.   Neurological: Positive for excessive daytime sleepiness and headaches.   All other systems reviewed and are negative.      The patient's past medical, social, family history and ROS reviewed in the patient's electronic medical record.    Allergies  Chlorhexidine and Codeine    Outpatient Prescriptions Marked as Taking for the 6/14/18 encounter (Consult) with Erick Powell IV, MD   Medication Sig Dispense Refill    • ALPRAZolam (XANAX) 1 MG tablet TK 1 T PO TID  2   • amitriptyline (ELAVIL) 50 MG tablet Take one tablet at bedtime for sleep 30 tablet 5   • Cholecalciferol (VITAMIN D) 2000 units capsule Take 2,000 Units by mouth Daily.     • Cyanocobalamin (VITAMIN B-12) 1000 MCG/15ML liquid Take 1,000 mcg by mouth Daily.     • esomeprazole (nexIUM) 20 MG capsule Take 20 mg by mouth Every Morning Before Breakfast.     • lidocaine-prilocaine (EMLA) 2.5-2.5 % cream Apply  topically As Needed (45-60 minutes prior to port access.  Cover with saran/plastic wrap.). 30 g 3   • magnesium oxide (MAG-OX) 400 MG tablet Take 400 mg by mouth Daily.     • metoprolol tartrate (LOPRESSOR) 100 MG tablet Take 1 tablet by mouth 2 (Two) Times a Day. Take half tablet or as directed (Patient taking differently: Take 100 mg by mouth 2 (Two) Times a Day.) 60 tablet 2   • Multiple Vitamins-Minerals (MULTIVITAMIN ADULT PO) Take 1 tablet by mouth Daily.     • ondansetron (ZOFRAN) 8 MG tablet Take 1 tablet by mouth 3 (Three) Times a Day As Needed for Nausea or Vomiting. 60 tablet 5   • ondansetron (ZOFRAN) 8 MG tablet Take 1 tablet by mouth Every 8 (Eight) Hours As Needed for Nausea or Vomiting. 90 tablet 5   • polyethyl glycol-propyl glycol (SYSTANE) 0.4-0.3 % solution ophthalmic solution 1 drop As Needed (for itchy eyes).     • promethazine (PHENERGAN) 12.5 MG tablet Take 1-2 tablets Q6 hours PRN nausea. 30 tablet 1   • traMADol (ULTRAM) 50 MG tablet Take 1 tablet by mouth Every 6 (Six) Hours As Needed for Moderate Pain . 20 tablet 0   • venlafaxine XR (EFFEXOR-XR) 75 MG 24 hr capsule Take one daily 30 capsule 5     Current Facility-Administered Medications for the 6/14/18 encounter (Consult) with Erick Powell IV, MD   Medication Dose Route Frequency Provider Last Rate Last Dose   • lidocaine (XYLOCAINE) 1 % injection 5 mL  5 mL Infiltration Once Oanh Clark MD       • lidocaine-EPINEPHrine (XYLOCAINE W/EPI) 1 %-1:[id number] injection 10  "mL  10 mL Infiltration Once Oanh Clark MD             Blood pressure 106/70, pulse (!) 128, height 174 cm (68.5\"), weight 91.3 kg (201 lb 3.2 oz).  Body mass index is 30.15 kg/m².    Physical Exam   Constitutional: She is oriented to person, place, and time. She appears well-developed and well-nourished.   HENT:   Head: Normocephalic and atraumatic.   Eyes: Pupils are equal, round, and reactive to light. No scleral icterus.   Neck: No JVD present. Carotid bruit is not present. No thyromegaly present.   Cardiovascular: Normal rate and regular rhythm.  Exam reveals no gallop.    No murmur heard.  Pulmonary/Chest: Effort normal and breath sounds normal.   Abdominal: Soft. She exhibits no distension. There is no hepatosplenomegaly.   Musculoskeletal: She exhibits no edema.   Neurological: She is alert and oriented to person, place, and time.   Skin: Skin is warm and dry.   Psychiatric: She has a normal mood and affect. Her behavior is normal.       Data Review:       ECG 12 Lead  Date/Time: 6/14/2018 5:16 PM  Performed by: JANAY CORONA IV  Authorized by: JANAY CORONA IV   Rhythm: sinus tachycardia  Rate: normal  BPM: 132  QRS axis: normal  Clinical impression: normal ECG            Lab Results   Component Value Date    AST 39 (H) 05/22/2018    ALT 60 (H) 05/22/2018       No results found for: HGBA1C         Problem List Items Addressed This Visit        Cardiovascular and Mediastinum    Sinus tachycardia - Primary    Overview     · Tachycardia noted after administration of paclitaxel for breast cancer, Spring 2018  · Echo (6/6/2018):  LVEF 45%  · EKG on metoprolol (6/14/2018): Sinus tachycardia at 132 BPM         Current Assessment & Plan     The patient has newly diagnosed sinus tachycardia which coincided with treatment with paclitaxel for cancer. The patient is mildly symptomatic for tachypalpitations and continues to have tachycardia despite escalating doses of metoprolol. She has " no other identified physiologic reasons for sinus tachycardia such as fever, hypovolemia, and has no suggestion of pulmonary embolus on her echocardiogram. I will repeat lab work including a blood count and thyroid function studies. Her echocardiogram did show mild LV systolic dysfunction which may be related to persistent tachycardia alone. I would like to slow her heart rate down for symptomatic relief although I do not believe her blood pressure will tolerate further escalation of metoprolol. Therefore, I will add Corlanor to her medical regimen in hopes of slowing her heart rate down. In the long-term, I hope her heart rate will readjust a normal as the effects of chemotherapy diminish.         Relevant Medications    ivabradine HCl (CORLANOR) 5 MG tablet tablet    Other Relevant Orders    Comprehensive Metabolic Panel    CBC & Differential    TSH               · Start Corlanor 5 mg bid  · CBC, CMP, and TSH  · Heart rate to be monitored at home and resulted to my office.  · Proceed with mastectomy with no further cardiac workup  · Eventual repeat echocardiogram and reassessment of LV systolic function  Return in about 6 weeks (around 7/26/2018).      Erick Powell IV, MD  6/14/2018

## 2018-06-14 NOTE — ASSESSMENT & PLAN NOTE
The patient has newly diagnosed sinus tachycardia which coincided with treatment with paclitaxel for cancer. The patient is mildly symptomatic for tachypalpitations and continues to have tachycardia despite escalating doses of metoprolol. She has no other identified physiologic reasons for sinus tachycardia such as fever, hypovolemia, and has no suggestion of pulmonary embolus on her echocardiogram. I will repeat lab work including a blood count and thyroid function studies. Her echocardiogram did show mild LV systolic dysfunction which may be related to persistent tachycardia alone. I would like to slow her heart rate down for symptomatic relief although I do not believe her blood pressure will tolerate further escalation of metoprolol. Therefore, I will add Corlanor to her medical regimen in hopes of slowing her heart rate down. In the long-term, I hope her heart rate will readjust a normal as the effects of chemotherapy diminish.

## 2018-06-15 ENCOUNTER — LAB (OUTPATIENT)
Dept: LAB | Facility: HOSPITAL | Age: 43
End: 2018-06-15

## 2018-06-15 DIAGNOSIS — R00.0 SINUS TACHYCARDIA: ICD-10-CM

## 2018-06-15 LAB
ALBUMIN SERPL-MCNC: 4.2 G/DL (ref 3.5–5)
ALBUMIN/GLOB SERPL: 1.6 G/DL (ref 1–2)
ALP SERPL-CCNC: 78 U/L (ref 38–126)
ALT SERPL W P-5'-P-CCNC: 50 U/L (ref 13–69)
ANION GAP SERPL CALCULATED.3IONS-SCNC: 14.5 MMOL/L (ref 10–20)
AST SERPL-CCNC: 37 U/L (ref 15–46)
BASOPHILS # BLD AUTO: 0.05 10*3/MM3 (ref 0–0.2)
BASOPHILS NFR BLD AUTO: 1.1 % (ref 0–2.5)
BILIRUB SERPL-MCNC: 0.3 MG/DL (ref 0.2–1.3)
BUN BLD-MCNC: 19 MG/DL (ref 7–20)
BUN/CREAT SERPL: 23.8 (ref 7.1–23.5)
CALCIUM SPEC-SCNC: 9.8 MG/DL (ref 8.4–10.2)
CHLORIDE SERPL-SCNC: 104 MMOL/L (ref 98–107)
CO2 SERPL-SCNC: 23 MMOL/L (ref 26–30)
CREAT BLD-MCNC: 0.8 MG/DL (ref 0.6–1.3)
DEPRECATED RDW RBC AUTO: 42.4 FL (ref 37–54)
EOSINOPHIL # BLD AUTO: 0.19 10*3/MM3 (ref 0–0.7)
EOSINOPHIL NFR BLD AUTO: 4.3 % (ref 0–7)
ERYTHROCYTE [DISTWIDTH] IN BLOOD BY AUTOMATED COUNT: 12.3 % (ref 11.5–14.5)
GFR SERPL CREATININE-BSD FRML MDRD: 79 ML/MIN/1.73
GLOBULIN UR ELPH-MCNC: 2.7 GM/DL
GLUCOSE BLD-MCNC: 99 MG/DL (ref 74–98)
HCT VFR BLD AUTO: 36.5 % (ref 37–47)
HGB BLD-MCNC: 12.4 G/DL (ref 12–16)
IMM GRANULOCYTES # BLD: 0.01 10*3/MM3 (ref 0–0.06)
IMM GRANULOCYTES NFR BLD: 0.2 % (ref 0–0.6)
LYMPHOCYTES # BLD AUTO: 1.16 10*3/MM3 (ref 0.6–3.4)
LYMPHOCYTES NFR BLD AUTO: 26 % (ref 10–50)
MCH RBC QN AUTO: 32.5 PG (ref 27–31)
MCHC RBC AUTO-ENTMCNC: 34 G/DL (ref 30–37)
MCV RBC AUTO: 95.5 FL (ref 81–99)
MONOCYTES # BLD AUTO: 0.54 10*3/MM3 (ref 0–0.9)
MONOCYTES NFR BLD AUTO: 12.1 % (ref 0–12)
NEUTROPHILS # BLD AUTO: 2.51 10*3/MM3 (ref 2–6.9)
NEUTROPHILS NFR BLD AUTO: 56.3 % (ref 37–80)
NRBC BLD MANUAL-RTO: 0 /100 WBC (ref 0–0)
PLATELET # BLD AUTO: 258 10*3/MM3 (ref 130–400)
PMV BLD AUTO: 11.1 FL (ref 6–12)
POTASSIUM BLD-SCNC: 4.5 MMOL/L (ref 3.5–5.1)
PROT SERPL-MCNC: 6.9 G/DL (ref 6.3–8.2)
RBC # BLD AUTO: 3.82 10*6/MM3 (ref 4.2–5.4)
SODIUM BLD-SCNC: 137 MMOL/L (ref 137–145)
TSH SERPL DL<=0.05 MIU/L-ACNC: 2.41 MIU/ML (ref 0.47–4.68)
WBC NRBC COR # BLD: 4.46 10*3/MM3 (ref 4.8–10.8)

## 2018-06-15 PROCEDURE — 36415 COLL VENOUS BLD VENIPUNCTURE: CPT

## 2018-06-15 PROCEDURE — 80053 COMPREHEN METABOLIC PANEL: CPT

## 2018-06-15 PROCEDURE — 85025 COMPLETE CBC W/AUTO DIFF WBC: CPT

## 2018-06-15 PROCEDURE — 84443 ASSAY THYROID STIM HORMONE: CPT

## 2018-06-18 ENCOUNTER — DOCUMENTATION (OUTPATIENT)
Dept: CARDIOLOGY | Facility: CLINIC | Age: 43
End: 2018-06-18

## 2018-06-19 ENCOUNTER — TELEPHONE (OUTPATIENT)
Dept: CARDIOLOGY | Facility: CLINIC | Age: 43
End: 2018-06-19

## 2018-06-20 ENCOUNTER — APPOINTMENT (OUTPATIENT)
Dept: PREADMISSION TESTING | Facility: HOSPITAL | Age: 43
End: 2018-06-20

## 2018-06-20 ENCOUNTER — TELEPHONE (OUTPATIENT)
Dept: CARDIOLOGY | Facility: CLINIC | Age: 43
End: 2018-06-20

## 2018-06-20 ENCOUNTER — TRANSCRIBE ORDERS (OUTPATIENT)
Dept: ADMINISTRATIVE | Facility: HOSPITAL | Age: 43
End: 2018-06-20

## 2018-06-20 VITALS — WEIGHT: 201.94 LBS | BODY MASS INDEX: 29.91 KG/M2 | HEIGHT: 69 IN

## 2018-06-20 DIAGNOSIS — C50.411 MALIGNANT NEOPLASM OF UPPER-OUTER QUADRANT OF RIGHT FEMALE BREAST, UNSPECIFIED ESTROGEN RECEPTOR STATUS (HCC): Primary | ICD-10-CM

## 2018-06-20 NOTE — TELEPHONE ENCOUNTER
I notified the patient that her Corlanor PA was denied. I have sent an appeal to [phone]. Will update patient once a response is received.

## 2018-06-21 ENCOUNTER — HOSPITAL ENCOUNTER (OUTPATIENT)
Dept: NUCLEAR MEDICINE | Facility: HOSPITAL | Age: 43
Discharge: HOME | End: 2018-06-21
Attending: SURGERY

## 2018-06-21 ENCOUNTER — ANESTHESIA EVENT (OUTPATIENT)
Dept: PERIOP | Facility: HOSPITAL | Age: 43
End: 2018-06-21

## 2018-06-21 ENCOUNTER — ANESTHESIA (OUTPATIENT)
Dept: PERIOP | Facility: HOSPITAL | Age: 43
End: 2018-06-21

## 2018-06-21 ENCOUNTER — HOSPITAL ENCOUNTER (OUTPATIENT)
Facility: HOSPITAL | Age: 43
Discharge: HOME | End: 2018-06-22
Attending: SURGERY | Admitting: SURGERY

## 2018-06-21 DIAGNOSIS — C50.919 BREAST CANCER (HCC): ICD-10-CM

## 2018-06-21 DIAGNOSIS — C50.411 MALIGNANT NEOPLASM OF UPPER-OUTER QUADRANT OF RIGHT FEMALE BREAST, UNSPECIFIED ESTROGEN RECEPTOR STATUS (HCC): ICD-10-CM

## 2018-06-21 LAB
B-HCG UR QL: NEGATIVE
INTERNAL NEGATIVE CONTROL: NEGATIVE
INTERNAL POSITIVE CONTROL: POSITIVE
Lab: NORMAL
POTASSIUM BLDA-SCNC: 4 MMOL/L (ref 3.5–5.3)

## 2018-06-21 PROCEDURE — 25010000002 DEXAMETHASONE PER 1 MG: Performed by: NURSE ANESTHETIST, CERTIFIED REGISTERED

## 2018-06-21 PROCEDURE — 25010000002 NEOSTIGMINE PER 0.5 MG: Performed by: NURSE ANESTHETIST, CERTIFIED REGISTERED

## 2018-06-21 PROCEDURE — 88331 PATH CONSLTJ SURG 1 BLK 1SPC: CPT | Performed by: PATHOLOGY

## 2018-06-21 PROCEDURE — 84132 ASSAY OF SERUM POTASSIUM: CPT | Performed by: ANESTHESIOLOGY

## 2018-06-21 PROCEDURE — A9541 TC99M SULFUR COLLOID: HCPCS | Performed by: SURGERY

## 2018-06-21 PROCEDURE — 88307 TISSUE EXAM BY PATHOLOGIST: CPT | Performed by: SURGERY

## 2018-06-21 PROCEDURE — 25010000002 DEXAMETHASONE SODIUM PHOSPHATE 10 MG/ML SOLUTION 1 ML VIAL: Performed by: NURSE ANESTHETIST, CERTIFIED REGISTERED

## 2018-06-21 PROCEDURE — 25010000002 FENTANYL CITRATE (PF) 100 MCG/2ML SOLUTION: Performed by: NURSE ANESTHETIST, CERTIFIED REGISTERED

## 2018-06-21 PROCEDURE — 25010000002 ONDANSETRON PER 1 MG: Performed by: NURSE ANESTHETIST, CERTIFIED REGISTERED

## 2018-06-21 PROCEDURE — 25010000003 CEFAZOLIN IN DEXTROSE 2-4 GM/100ML-% SOLUTION: Performed by: SURGERY

## 2018-06-21 PROCEDURE — 25010000002 HYDROMORPHONE PER 4 MG: Performed by: SURGERY

## 2018-06-21 PROCEDURE — 88305 TISSUE EXAM BY PATHOLOGIST: CPT | Performed by: SURGERY

## 2018-06-21 PROCEDURE — 0 TECHNETIUM FILTERED SULFUR COLLOID: Performed by: SURGERY

## 2018-06-21 PROCEDURE — 25010000002 MIDAZOLAM PER 1 MG: Performed by: ANESTHESIOLOGY

## 2018-06-21 PROCEDURE — 38792 RA TRACER ID OF SENTINL NODE: CPT

## 2018-06-21 PROCEDURE — 25010000002 BUPRENORPHINE PER 0.1 MG: Performed by: NURSE ANESTHETIST, CERTIFIED REGISTERED

## 2018-06-21 PROCEDURE — 25010000002 PROPOFOL 10 MG/ML EMULSION: Performed by: NURSE ANESTHETIST, CERTIFIED REGISTERED

## 2018-06-21 RX ORDER — OXYCODONE HYDROCHLORIDE 5 MG/1
5 TABLET ORAL EVERY 4 HOURS PRN
Status: DISCONTINUED | OUTPATIENT
Start: 2018-06-21 | End: 2018-06-22 | Stop reason: HOSPADM

## 2018-06-21 RX ORDER — GLYCOPYRROLATE 0.2 MG/ML
INJECTION INTRAMUSCULAR; INTRAVENOUS AS NEEDED
Status: DISCONTINUED | OUTPATIENT
Start: 2018-06-21 | End: 2018-06-21 | Stop reason: SURG

## 2018-06-21 RX ORDER — CEFAZOLIN SODIUM 2 G/100ML
2 INJECTION, SOLUTION INTRAVENOUS ONCE
Status: COMPLETED | OUTPATIENT
Start: 2018-06-21 | End: 2018-06-21

## 2018-06-21 RX ORDER — ESMOLOL HYDROCHLORIDE 10 MG/ML
INJECTION INTRAVENOUS AS NEEDED
Status: DISCONTINUED | OUTPATIENT
Start: 2018-06-21 | End: 2018-06-21 | Stop reason: SURG

## 2018-06-21 RX ORDER — FENTANYL CITRATE 50 UG/ML
50 INJECTION, SOLUTION INTRAMUSCULAR; INTRAVENOUS
Status: DISCONTINUED | OUTPATIENT
Start: 2018-06-21 | End: 2018-06-21 | Stop reason: HOSPADM

## 2018-06-21 RX ORDER — PROMETHAZINE HYDROCHLORIDE 25 MG/1
25 SUPPOSITORY RECTAL ONCE AS NEEDED
Status: DISCONTINUED | OUTPATIENT
Start: 2018-06-21 | End: 2018-06-21 | Stop reason: HOSPADM

## 2018-06-21 RX ORDER — CEFAZOLIN SODIUM 2 G/100ML
2 INJECTION, SOLUTION INTRAVENOUS EVERY 8 HOURS
Status: COMPLETED | OUTPATIENT
Start: 2018-06-21 | End: 2018-06-22

## 2018-06-21 RX ORDER — CELECOXIB 200 MG/1
200 CAPSULE ORAL EVERY 12 HOURS
Status: DISCONTINUED | OUTPATIENT
Start: 2018-06-21 | End: 2018-06-22 | Stop reason: HOSPADM

## 2018-06-21 RX ORDER — FAMOTIDINE 20 MG/1
20 TABLET, FILM COATED ORAL ONCE
Status: COMPLETED | OUTPATIENT
Start: 2018-06-21 | End: 2018-06-21

## 2018-06-21 RX ORDER — SCOLOPAMINE TRANSDERMAL SYSTEM 1 MG/1
1 PATCH, EXTENDED RELEASE TRANSDERMAL ONCE
Status: DISCONTINUED | OUTPATIENT
Start: 2018-06-21 | End: 2018-06-22

## 2018-06-21 RX ORDER — PROMETHAZINE HYDROCHLORIDE 25 MG/1
25 TABLET ORAL ONCE AS NEEDED
Status: DISCONTINUED | OUTPATIENT
Start: 2018-06-21 | End: 2018-06-21 | Stop reason: HOSPADM

## 2018-06-21 RX ORDER — ACETAMINOPHEN 500 MG
1000 TABLET ORAL EVERY 6 HOURS SCHEDULED
Status: DISCONTINUED | OUTPATIENT
Start: 2018-06-21 | End: 2018-06-22 | Stop reason: HOSPADM

## 2018-06-21 RX ORDER — MIDAZOLAM HYDROCHLORIDE 1 MG/ML
2 INJECTION INTRAMUSCULAR; INTRAVENOUS ONCE
Status: COMPLETED | OUTPATIENT
Start: 2018-06-21 | End: 2018-06-21

## 2018-06-21 RX ORDER — PANTOPRAZOLE SODIUM 40 MG/1
40 TABLET, DELAYED RELEASE ORAL
Status: DISCONTINUED | OUTPATIENT
Start: 2018-06-21 | End: 2018-06-22 | Stop reason: HOSPADM

## 2018-06-21 RX ORDER — ONDANSETRON 2 MG/ML
INJECTION INTRAMUSCULAR; INTRAVENOUS AS NEEDED
Status: DISCONTINUED | OUTPATIENT
Start: 2018-06-21 | End: 2018-06-21 | Stop reason: SURG

## 2018-06-21 RX ORDER — PROMETHAZINE HYDROCHLORIDE 25 MG/ML
6.25 INJECTION, SOLUTION INTRAMUSCULAR; INTRAVENOUS ONCE AS NEEDED
Status: DISCONTINUED | OUTPATIENT
Start: 2018-06-21 | End: 2018-06-21 | Stop reason: HOSPADM

## 2018-06-21 RX ORDER — ONDANSETRON 2 MG/ML
4 INJECTION INTRAMUSCULAR; INTRAVENOUS EVERY 6 HOURS PRN
Status: DISCONTINUED | OUTPATIENT
Start: 2018-06-21 | End: 2018-06-22 | Stop reason: HOSPADM

## 2018-06-21 RX ORDER — LORAZEPAM 0.5 MG/1
0.5 TABLET ORAL EVERY 8 HOURS PRN
Status: DISCONTINUED | OUTPATIENT
Start: 2018-06-21 | End: 2018-06-22 | Stop reason: HOSPADM

## 2018-06-21 RX ORDER — AMITRIPTYLINE HYDROCHLORIDE 50 MG/1
50 TABLET, FILM COATED ORAL NIGHTLY
Status: DISCONTINUED | OUTPATIENT
Start: 2018-06-21 | End: 2018-06-22 | Stop reason: HOSPADM

## 2018-06-21 RX ORDER — CELECOXIB 200 MG/1
200 CAPSULE ORAL ONCE
Status: COMPLETED | OUTPATIENT
Start: 2018-06-21 | End: 2018-06-21

## 2018-06-21 RX ORDER — ATRACURIUM BESYLATE 10 MG/ML
INJECTION, SOLUTION INTRAVENOUS AS NEEDED
Status: DISCONTINUED | OUTPATIENT
Start: 2018-06-21 | End: 2018-06-21 | Stop reason: SURG

## 2018-06-21 RX ORDER — PREGABALIN 75 MG/1
75 CAPSULE ORAL ONCE
Status: COMPLETED | OUTPATIENT
Start: 2018-06-21 | End: 2018-06-21

## 2018-06-21 RX ORDER — PROPOFOL 10 MG/ML
VIAL (ML) INTRAVENOUS CONTINUOUS PRN
Status: DISCONTINUED | OUTPATIENT
Start: 2018-06-21 | End: 2018-06-21 | Stop reason: SURG

## 2018-06-21 RX ORDER — SODIUM CHLORIDE, SODIUM LACTATE, POTASSIUM CHLORIDE, CALCIUM CHLORIDE 600; 310; 30; 20 MG/100ML; MG/100ML; MG/100ML; MG/100ML
9 INJECTION, SOLUTION INTRAVENOUS CONTINUOUS
Status: DISCONTINUED | OUTPATIENT
Start: 2018-06-21 | End: 2018-06-22 | Stop reason: HOSPADM

## 2018-06-21 RX ORDER — VENLAFAXINE HYDROCHLORIDE 75 MG/1
75 CAPSULE, EXTENDED RELEASE ORAL DAILY
Status: DISCONTINUED | OUTPATIENT
Start: 2018-06-21 | End: 2018-06-22 | Stop reason: HOSPADM

## 2018-06-21 RX ORDER — NALOXONE HCL 0.4 MG/ML
0.1 VIAL (ML) INJECTION
Status: DISCONTINUED | OUTPATIENT
Start: 2018-06-21 | End: 2018-06-22 | Stop reason: HOSPADM

## 2018-06-21 RX ORDER — OXYCODONE HYDROCHLORIDE AND ACETAMINOPHEN 5; 325 MG/1; MG/1
1 TABLET ORAL ONCE AS NEEDED
Status: DISCONTINUED | OUTPATIENT
Start: 2018-06-21 | End: 2018-06-21 | Stop reason: HOSPADM

## 2018-06-21 RX ORDER — EPHEDRINE SULFATE 50 MG/ML
5 INJECTION, SOLUTION INTRAVENOUS ONCE AS NEEDED
Status: DISCONTINUED | OUTPATIENT
Start: 2018-06-21 | End: 2018-06-21 | Stop reason: HOSPADM

## 2018-06-21 RX ORDER — PROPOFOL 10 MG/ML
VIAL (ML) INTRAVENOUS AS NEEDED
Status: DISCONTINUED | OUTPATIENT
Start: 2018-06-21 | End: 2018-06-21 | Stop reason: SURG

## 2018-06-21 RX ORDER — LIDOCAINE HYDROCHLORIDE 10 MG/ML
0.5 INJECTION, SOLUTION EPIDURAL; INFILTRATION; INTRACAUDAL; PERINEURAL ONCE AS NEEDED
Status: COMPLETED | OUTPATIENT
Start: 2018-06-21 | End: 2018-06-21

## 2018-06-21 RX ORDER — FENTANYL CITRATE 50 UG/ML
INJECTION, SOLUTION INTRAMUSCULAR; INTRAVENOUS AS NEEDED
Status: DISCONTINUED | OUTPATIENT
Start: 2018-06-21 | End: 2018-06-21 | Stop reason: SURG

## 2018-06-21 RX ORDER — LIDOCAINE HYDROCHLORIDE 10 MG/ML
INJECTION, SOLUTION INFILTRATION; PERINEURAL AS NEEDED
Status: DISCONTINUED | OUTPATIENT
Start: 2018-06-21 | End: 2018-06-21 | Stop reason: SURG

## 2018-06-21 RX ORDER — SODIUM CHLORIDE 9 MG/ML
50 INJECTION, SOLUTION INTRAVENOUS CONTINUOUS
Status: DISCONTINUED | OUTPATIENT
Start: 2018-06-21 | End: 2018-06-22 | Stop reason: HOSPADM

## 2018-06-21 RX ORDER — DEXAMETHASONE SODIUM PHOSPHATE 4 MG/ML
INJECTION, SOLUTION INTRA-ARTICULAR; INTRALESIONAL; INTRAMUSCULAR; INTRAVENOUS; SOFT TISSUE AS NEEDED
Status: DISCONTINUED | OUTPATIENT
Start: 2018-06-21 | End: 2018-06-21 | Stop reason: SURG

## 2018-06-21 RX ORDER — DIPHENHYDRAMINE HYDROCHLORIDE 50 MG/ML
12.5 INJECTION INTRAMUSCULAR; INTRAVENOUS EVERY 6 HOURS PRN
Status: DISCONTINUED | OUTPATIENT
Start: 2018-06-21 | End: 2018-06-22 | Stop reason: HOSPADM

## 2018-06-21 RX ORDER — METOPROLOL TARTRATE 100 MG/1
100 TABLET ORAL 2 TIMES DAILY
Status: DISCONTINUED | OUTPATIENT
Start: 2018-06-21 | End: 2018-06-22 | Stop reason: HOSPADM

## 2018-06-21 RX ORDER — LABETALOL HYDROCHLORIDE 5 MG/ML
INJECTION, SOLUTION INTRAVENOUS AS NEEDED
Status: DISCONTINUED | OUTPATIENT
Start: 2018-06-21 | End: 2018-06-21 | Stop reason: SURG

## 2018-06-21 RX ORDER — SODIUM CHLORIDE 0.9 % (FLUSH) 0.9 %
1-10 SYRINGE (ML) INJECTION AS NEEDED
Status: DISCONTINUED | OUTPATIENT
Start: 2018-06-21 | End: 2018-06-21 | Stop reason: HOSPADM

## 2018-06-21 RX ORDER — MAGNESIUM HYDROXIDE 1200 MG/15ML
LIQUID ORAL AS NEEDED
Status: DISCONTINUED | OUTPATIENT
Start: 2018-06-21 | End: 2018-06-21 | Stop reason: HOSPADM

## 2018-06-21 RX ORDER — ACETAMINOPHEN 500 MG
1000 TABLET ORAL ONCE
Status: COMPLETED | OUTPATIENT
Start: 2018-06-21 | End: 2018-06-21

## 2018-06-21 RX ORDER — PROMETHAZINE HYDROCHLORIDE 25 MG/ML
6.25 INJECTION, SOLUTION INTRAMUSCULAR; INTRAVENOUS EVERY 6 HOURS PRN
Status: DISCONTINUED | OUTPATIENT
Start: 2018-06-21 | End: 2018-06-22 | Stop reason: HOSPADM

## 2018-06-21 RX ADMIN — ACETAMINOPHEN 1000 MG: 500 TABLET, FILM COATED ORAL at 12:16

## 2018-06-21 RX ADMIN — FENTANYL CITRATE 50 MCG: 50 INJECTION, SOLUTION INTRAMUSCULAR; INTRAVENOUS at 10:20

## 2018-06-21 RX ADMIN — CEFAZOLIN SODIUM 2 G: 2 INJECTION, SOLUTION INTRAVENOUS at 07:12

## 2018-06-21 RX ADMIN — ONDANSETRON 4 MG: 2 INJECTION INTRAMUSCULAR; INTRAVENOUS at 09:23

## 2018-06-21 RX ADMIN — OXYCODONE HYDROCHLORIDE 5 MG: 5 TABLET ORAL at 14:17

## 2018-06-21 RX ADMIN — TECHNETIUM TC 99M SULFUR COLLOID 1 DOSE: KIT at 07:20

## 2018-06-21 RX ADMIN — FENTANYL CITRATE 50 MCG: 50 INJECTION, SOLUTION INTRAMUSCULAR; INTRAVENOUS at 10:10

## 2018-06-21 RX ADMIN — LABETALOL HYDROCHLORIDE 10 MG: 5 INJECTION, SOLUTION INTRAVENOUS at 07:38

## 2018-06-21 RX ADMIN — FENTANYL CITRATE 50 MCG: 50 INJECTION, SOLUTION INTRAMUSCULAR; INTRAVENOUS at 10:00

## 2018-06-21 RX ADMIN — HYDROMORPHONE HYDROCHLORIDE 0.3 MG: 1 INJECTION, SOLUTION INTRAMUSCULAR; INTRAVENOUS; SUBCUTANEOUS at 23:56

## 2018-06-21 RX ADMIN — ATRACURIUM BESYLATE 20 MG: 10 INJECTION, SOLUTION INTRAVENOUS at 08:20

## 2018-06-21 RX ADMIN — SODIUM CHLORIDE 50 ML/HR: 9 INJECTION, SOLUTION INTRAVENOUS at 10:59

## 2018-06-21 RX ADMIN — FENTANYL CITRATE 50 MCG: 50 INJECTION, SOLUTION INTRAMUSCULAR; INTRAVENOUS at 10:15

## 2018-06-21 RX ADMIN — VENLAFAXINE HYDROCHLORIDE 75 MG: 75 CAPSULE, EXTENDED RELEASE ORAL at 11:22

## 2018-06-21 RX ADMIN — HYDROMORPHONE HYDROCHLORIDE 0.3 MG: 1 INJECTION, SOLUTION INTRAMUSCULAR; INTRAVENOUS; SUBCUTANEOUS at 16:29

## 2018-06-21 RX ADMIN — PREGABALIN 75 MG: 75 CAPSULE ORAL at 06:24

## 2018-06-21 RX ADMIN — METOPROLOL TARTRATE 100 MG: 100 TABLET ORAL at 11:23

## 2018-06-21 RX ADMIN — HYDROMORPHONE HYDROCHLORIDE 0.3 MG: 1 INJECTION, SOLUTION INTRAMUSCULAR; INTRAVENOUS; SUBCUTANEOUS at 18:37

## 2018-06-21 RX ADMIN — OXYCODONE HYDROCHLORIDE 5 MG: 5 TABLET ORAL at 20:43

## 2018-06-21 RX ADMIN — CELECOXIB 200 MG: 200 CAPSULE ORAL at 17:55

## 2018-06-21 RX ADMIN — LIDOCAINE HYDROCHLORIDE 50 MG: 10 INJECTION, SOLUTION INFILTRATION; PERINEURAL at 07:15

## 2018-06-21 RX ADMIN — ESMOLOL HYDROCHLORIDE 50 MG: 10 INJECTION INTRAVENOUS at 07:15

## 2018-06-21 RX ADMIN — METOPROLOL TARTRATE 5 MG: 5 INJECTION, SOLUTION INTRAVENOUS at 07:50

## 2018-06-21 RX ADMIN — HYDROMORPHONE HYDROCHLORIDE 0.3 MG: 1 INJECTION, SOLUTION INTRAMUSCULAR; INTRAVENOUS; SUBCUTANEOUS at 14:17

## 2018-06-21 RX ADMIN — PANTOPRAZOLE SODIUM 40 MG: 40 TABLET, DELAYED RELEASE ORAL at 11:21

## 2018-06-21 RX ADMIN — FENTANYL CITRATE 100 MCG: 50 INJECTION, SOLUTION INTRAMUSCULAR; INTRAVENOUS at 09:12

## 2018-06-21 RX ADMIN — LORAZEPAM 0.5 MG: 0.5 TABLET ORAL at 11:22

## 2018-06-21 RX ADMIN — LIDOCAINE HYDROCHLORIDE 0.2 ML: 10 INJECTION, SOLUTION EPIDURAL; INFILTRATION; INTRACAUDAL; PERINEURAL at 06:05

## 2018-06-21 RX ADMIN — DEXAMETHASONE SODIUM PHOSPHATE 61.4 ML: 10 INJECTION, SOLUTION INTRAMUSCULAR; INTRAVENOUS at 07:16

## 2018-06-21 RX ADMIN — DEXAMETHASONE SODIUM PHOSPHATE 8 MG: 4 INJECTION, SOLUTION INTRAMUSCULAR; INTRAVENOUS at 07:15

## 2018-06-21 RX ADMIN — ACETAMINOPHEN 1000 MG: 500 TABLET, FILM COATED ORAL at 17:55

## 2018-06-21 RX ADMIN — EPHEDRINE SULFATE 15 MG: 50 INJECTION INTRAMUSCULAR; INTRAVENOUS; SUBCUTANEOUS at 08:41

## 2018-06-21 RX ADMIN — ACETAMINOPHEN 1000 MG: 500 TABLET, FILM COATED ORAL at 06:23

## 2018-06-21 RX ADMIN — MIDAZOLAM HYDROCHLORIDE 2 MG: 1 INJECTION, SOLUTION INTRAMUSCULAR; INTRAVENOUS at 07:04

## 2018-06-21 RX ADMIN — AMITRIPTYLINE HYDROCHLORIDE 50 MG: 50 TABLET, FILM COATED ORAL at 20:44

## 2018-06-21 RX ADMIN — FAMOTIDINE 20 MG: 20 TABLET ORAL at 06:23

## 2018-06-21 RX ADMIN — PROPOFOL 200 MG: 10 INJECTION, EMULSION INTRAVENOUS at 07:15

## 2018-06-21 RX ADMIN — ATRACURIUM BESYLATE 50 MG: 10 INJECTION, SOLUTION INTRAVENOUS at 07:15

## 2018-06-21 RX ADMIN — METOPROLOL TARTRATE 100 MG: 100 TABLET ORAL at 20:44

## 2018-06-21 RX ADMIN — SODIUM CHLORIDE, POTASSIUM CHLORIDE, SODIUM LACTATE AND CALCIUM CHLORIDE 9 ML/HR: 600; 310; 30; 20 INJECTION, SOLUTION INTRAVENOUS at 06:05

## 2018-06-21 RX ADMIN — CELECOXIB 200 MG: 200 CAPSULE ORAL at 06:23

## 2018-06-21 RX ADMIN — LABETALOL HYDROCHLORIDE 10 MG: 5 INJECTION, SOLUTION INTRAVENOUS at 07:48

## 2018-06-21 RX ADMIN — HYDROMORPHONE HYDROCHLORIDE 0.3 MG: 1 INJECTION, SOLUTION INTRAMUSCULAR; INTRAVENOUS; SUBCUTANEOUS at 11:22

## 2018-06-21 RX ADMIN — CEFAZOLIN SODIUM 2 G: 2 INJECTION, SOLUTION INTRAVENOUS at 15:57

## 2018-06-21 RX ADMIN — GLYCOPYRROLATE 0.4 MG: 0.2 INJECTION, SOLUTION INTRAMUSCULAR; INTRAVENOUS at 09:32

## 2018-06-21 RX ADMIN — SCOPALAMINE 1 PATCH: 1 PATCH, EXTENDED RELEASE TRANSDERMAL at 06:21

## 2018-06-21 RX ADMIN — HYDROMORPHONE HYDROCHLORIDE 0.3 MG: 1 INJECTION, SOLUTION INTRAMUSCULAR; INTRAVENOUS; SUBCUTANEOUS at 21:49

## 2018-06-21 RX ADMIN — PROPOFOL 200 MCG/KG/MIN: 10 INJECTION, EMULSION INTRAVENOUS at 07:15

## 2018-06-21 RX ADMIN — Medication 4 MG: at 09:32

## 2018-06-21 NOTE — ANESTHESIA POSTPROCEDURE EVALUATION
Patient: Estefany Bryan    Procedure Summary     Date:  06/21/18 Room / Location:   [facility]    Anesthesia Start:  0712 Anesthesia Stop:  0949    Procedure:  RIGHT SKIN SPARING BREAST MASTECTOMY WITH RIGHT SENTINEL NODE BIOPSY, PROPHYLATIC SKIN SPARING MASTECTOMY, AND REMOVAL OF PORT (Right Breast) Diagnosis:  Malignant neoplasm of upper-outer quadrant of right female breast    Surgeon:  Sandeep Canela MD Provider:  Isidro Miller MD    Anesthesia Type:  general ASA Status:  3          Anesthesia Type: general  Last vitals  BP   99/47 (06/21/18 0945)   Temp   98 °F (36.7 °C) (06/21/18 0945)   Pulse   75 (06/21/18 0945)   Resp   16 (06/21/18 0945)     SpO2   100 % (06/21/18 0945)     Post Anesthesia Care and Evaluation    Patient location during evaluation: PACU  Patient participation: complete - patient participated  Level of consciousness: awake and alert  Pain score: 0  Pain management: adequate  Airway patency: patent  Anesthetic complications: No anesthetic complications  PONV Status: none  Cardiovascular status: hemodynamically stable and acceptable  Respiratory status: nonlabored ventilation, acceptable and nasal cannula  Hydration status: acceptable

## 2018-06-21 NOTE — ANESTHESIA PREPROCEDURE EVALUATION
Anesthesia Evaluation     NPO Solid Status: > 8 hours  NPO Liquid Status: > 8 hours           Airway   Mallampati: II  TM distance: >3 FB  Neck ROM: full  No difficulty expected  Dental      Pulmonary - normal exam   (+) a smoker Current,   (-) asthma  Cardiovascular     ECG reviewed  Rhythm: regular  Rate: normal    (+) hypertension,   (-) pacemaker, angina, murmur, cardiac stents, CABG      Neuro/Psych  (-) seizures, TIA, CVA  GI/Hepatic/Renal/Endo    (-) liver disease, no renal disease, diabetes    Musculoskeletal     Abdominal    Substance History      OB/GYN          Other        ROS/Med Hx Other: Followed by Dr. Powell, cardiology,  EF 6/6/18 was 45% .  Otherwise normal valves                  Anesthesia Plan    ASA 3     general   (GA  PECS blocks bilateral )  intravenous induction     Plan discussed with CRNA.

## 2018-06-21 NOTE — ANESTHESIA PROCEDURE NOTES
Airway  Urgency: elective    Airway not difficult    General Information and Staff    Patient location during procedure: OR  CRNA: MARYAN BARR    Indications and Patient Condition  Indications for airway management: airway protection    Preoxygenated: yes  MILS not maintained throughout  Mask difficulty assessment: 1 - vent by mask    Final Airway Details  Final airway type: endotracheal airway      Successful airway: ETT  Cuffed: yes   Successful intubation technique: direct laryngoscopy  Endotracheal tube insertion site: oral  Blade: Tavarez  Blade size: #2  ETT size: 6.5 mm  Cormack-Lehane Classification: grade I - full view of glottis  Placement verified by: chest auscultation and capnometry   Cuff volume (mL): 8  Measured from: lips  ETT to lips (cm): 20  Number of attempts at approach: 1    Additional Comments  Negative epigastric sounds, Breath sound equal bilaterally with symmetric chest rise and fall. Atraumatic, no damage to lips or teeth during intubation

## 2018-06-22 VITALS
DIASTOLIC BLOOD PRESSURE: 74 MMHG | RESPIRATION RATE: 18 BRPM | HEIGHT: 69 IN | TEMPERATURE: 97.9 F | WEIGHT: 201.94 LBS | HEART RATE: 88 BPM | OXYGEN SATURATION: 94 % | BODY MASS INDEX: 29.91 KG/M2 | SYSTOLIC BLOOD PRESSURE: 135 MMHG

## 2018-06-22 PROCEDURE — 25010000003 CEFAZOLIN IN DEXTROSE 2-4 GM/100ML-% SOLUTION: Performed by: SURGERY

## 2018-06-22 PROCEDURE — 25010000002 HYDROMORPHONE PER 4 MG: Performed by: SURGERY

## 2018-06-22 RX ADMIN — HYDROMORPHONE HYDROCHLORIDE 0.3 MG: 1 INJECTION, SOLUTION INTRAMUSCULAR; INTRAVENOUS; SUBCUTANEOUS at 06:33

## 2018-06-22 RX ADMIN — OXYCODONE HYDROCHLORIDE 5 MG: 5 TABLET ORAL at 08:14

## 2018-06-22 RX ADMIN — CEFAZOLIN SODIUM 2 G: 2 INJECTION, SOLUTION INTRAVENOUS at 00:02

## 2018-06-22 RX ADMIN — HYDROMORPHONE HYDROCHLORIDE 0.3 MG: 1 INJECTION, SOLUTION INTRAMUSCULAR; INTRAVENOUS; SUBCUTANEOUS at 04:25

## 2018-06-22 RX ADMIN — PANTOPRAZOLE SODIUM 40 MG: 40 TABLET, DELAYED RELEASE ORAL at 06:32

## 2018-06-22 RX ADMIN — ACETAMINOPHEN 1000 MG: 500 TABLET, FILM COATED ORAL at 06:32

## 2018-06-22 RX ADMIN — CELECOXIB 200 MG: 200 CAPSULE ORAL at 06:32

## 2018-06-22 RX ADMIN — VENLAFAXINE HYDROCHLORIDE 75 MG: 75 CAPSULE, EXTENDED RELEASE ORAL at 08:14

## 2018-06-22 RX ADMIN — ACETAMINOPHEN 1000 MG: 500 TABLET, FILM COATED ORAL at 00:01

## 2018-06-22 RX ADMIN — METOPROLOL TARTRATE 100 MG: 100 TABLET ORAL at 08:15

## 2018-06-29 LAB
CYTO UR: NORMAL
LAB AP CASE REPORT: NORMAL
LAB AP CLINICAL INFORMATION: NORMAL
LAB AP DIAGNOSIS COMMENT: NORMAL
Lab: NORMAL
PATH REPORT.FINAL DX SPEC: NORMAL
PATH REPORT.GROSS SPEC: NORMAL

## 2018-07-11 ENCOUNTER — HOSPITAL ENCOUNTER (OUTPATIENT)
Dept: RADIATION ONCOLOGY | Facility: HOSPITAL | Age: 43
Setting detail: RADIATION/ONCOLOGY SERIES
Discharge: HOME | End: 2018-07-11

## 2018-07-11 ENCOUNTER — OFFICE VISIT (OUTPATIENT)
Dept: RADIATION ONCOLOGY | Facility: HOSPITAL | Age: 43
End: 2018-07-11

## 2018-07-11 VITALS
WEIGHT: 192.6 LBS | SYSTOLIC BLOOD PRESSURE: 110 MMHG | DIASTOLIC BLOOD PRESSURE: 75 MMHG | BODY MASS INDEX: 28.53 KG/M2 | HEIGHT: 69 IN | TEMPERATURE: 98.3 F | RESPIRATION RATE: 16 BRPM | HEART RATE: 80 BPM

## 2018-07-11 DIAGNOSIS — Z17.1 MALIGNANT NEOPLASM OF OVERLAPPING SITES OF RIGHT BREAST IN FEMALE, ESTROGEN RECEPTOR NEGATIVE (HCC): Primary | ICD-10-CM

## 2018-07-11 DIAGNOSIS — C50.811 MALIGNANT NEOPLASM OF OVERLAPPING SITES OF RIGHT BREAST IN FEMALE, ESTROGEN RECEPTOR NEGATIVE (HCC): Primary | ICD-10-CM

## 2018-07-11 NOTE — PROGRESS NOTES
CONSULTATION NOTE      :                                                          1975  DATE OF CONSULTATION:                       2018  REQUESTING PHYSICIAN:                   CIPRIANO Pang FOR CONSULTATION:          Cancer Staging  Breast cancer (CMS/[facility])  Staging form: Breast, AJCC 8th Edition  - Clinical: No stage assigned - Unsigned  - Pathologic stage from 2018: No Stage Recommended (ypT1a, pN0, cM1, ER: Negative, CT: Negative, HER2: Negative) - Signed by Aliza Levin MD on 2018    Breast cancer, right (CMS/[facility])  Staging form: Breast, AJCC 8th Edition  - Clinical stage from 1/10/2018: Stage IIIC (cT3, cN1, cM0, G3, ER: Negative, CT: Negative, HER2: Negative) - Signed by Winsome Romano MD on 1/10/2018         BRIEF HISTORY:  The patient is a very pleasant 42 y.o. female  palpated a right breast mass associated with pain.  Biopsy was positive for high-grade invasive ductal carcinoma, ER/CT-negative HER-2/radha negative.  The mass measured 9.7 cm.  She had enlarged lymph nodes present on imaging but FNA of 1 node was negative for malignancy.  Biopsy of this skin was negative for tumor.  PET/CT revealed a large bulky hypermetabolic rounded mass lesion in the lateral aspect of the right breast.  There is central necrosis and maximum SUV of 15.01.  Adjacent to that were two pathologically enlarged lymph nodes in the tail of Rothman and in the upper lateral aspect of the right breast with maximum SUV of 14.16.  She had no distant metastatic disease.  She underwent preoperative chemotherapy by Dr. Romano of ddAC followed by weekly Taxol.  On 2018 she had bilateral mastectomy and sentinel node sampling.  The left breast had a fibroadenoma 6 mm in size the right breast had residual invasive ductal carcinoma, high-grade.  There is a 5 mm focus.  There was no lymphovascular or perineural invasion and margins were negative.  0 of 4 lymph nodes were positive for tumor.  Ms.  Nasim is here with her mother and son, Renan to discuss postoperative radiotherapy.  She has no complaints except some fluid buildup in the right chest wall tissues and fatigue.  Her mother is visiting from [place].  Her son is a world champion martial arts competitor.  She has a daughter, Qing, who competes as well.        Allergies   Allergen Reactions   • Chlorhexidine Other (See Comments)     Pt. developed redness and burning sensation after using.   • Codeine GI Intolerance     If take with nexium pt is okay        Social History     Social History   • Marital status:      Social History Main Topics   • Smoking status: Current Every Day Smoker     Packs/day: 1.50     Years: 20.00     Types: Electronic Cigarette, Cigarettes   • Smokeless tobacco: Never Used      Comment: vape- 24 mg 5 years been vaper    quit smoking cig about 5 years ago or so      • Alcohol use No   • Drug use: No   • Sexual activity: Defer     Other Topics Concern   • Not on file       Past Medical History:   Diagnosis Date   • Anxiety    • Arthritis    • Breast cancer, right (CMS/HCC) 1/10/2018   • Dizzy    • Endometriosis    • GERD (gastroesophageal reflux disease) 01/23/2018    chemo related    • H/O Bell's palsy     oct 25 2015 or 2016   • High blood pressure    • History of chemotherapy     last therapy5/2018   • History of kidney stones    • Itchy eyes     bilat - from bells palsy    • Migraine     and tunnel vision    • Muscle ache     all over    • SOBOE (shortness of breath on exertion)    • Tachycardia    • Wears glasses        family history includes Breast cancer (age of onset: 25) in her cousin; Heart disease in her mother.     Past Surgical History:   Procedure Laterality Date   • BREAST BIOPSY Bilateral     and a lymphnode on the right. also skin punch biopsies   • COLONOSCOPY      x2   • DILATATION AND CURETTAGE      several with several miscarriages    • ENDOMETRIAL ABLATION     • ENDOSCOPY     • MASTECTOMY WITH  "SENTINEL NODE BIOPSY AND AXILLARY NODE DISSECTION Right 6/21/2018    Procedure: RIGHT SKIN SPARING BREAST MASTECTOMY WITH RIGHT SENTINEL NODE BIOPSY, PROPHYLATIC SKIN SPARING MASTECTOMY, AND REMOVAL OF PORT;  Surgeon: Sandeep Canela MD;  Location: [facility];  Service: General   • PORTACATH PLACEMENT     • TONSILLECTOMY      unsure about anoids - 1990    • WISDOM TOOTH EXTRACTION      all 4- 1994        Review of Systems   Constitutional: Positive for fatigue.   All other systems reviewed and are negative.          Objective   VITAL SIGNS:   Vitals:    07/11/18 1312   BP: 110/75   Pulse: 80   Resp: 16   Temp: 98.3 °F (36.8 °C)   Weight: 87.4 kg (192 lb 9.6 oz)   Height: 174 cm (68.5\")   PainSc: 0-No pain        Karnofsky score: 90      Physical Exam   Constitutional: She appears well-developed and well-nourished.   HENT:   Patient has facial weakness on the right due to  Bell's palsy.   Eyes: EOM are normal.   Neck: Neck supple.   Cardiovascular: Normal rate, regular rhythm and normal heart sounds.    Pulmonary/Chest: Effort normal and breath sounds normal.   Nursing note and vitals reviewed.  The examination of the chest wall revealed the breasts are surgically absent.  The left side has no masses suspicious lesions and a well-healed surgical incision the right has some fluid that shifts easily.  The surgical incision is well-healed and she has no axillary adenopathy bilaterally.         The following portions of the patient's history were reviewed and updated as appropriate: allergies, current medications, past family history, past medical history, past social history, past surgical history and problem list.    Assessment        Ms. Rouse has had a great response to chemotherapy and is now status post bilateral mastectomy    RECOMMENDATIONS:  I recommend postoperative radiotherapy to the right chest wall and regional lymphatics.  She had a tumor over 5 cm and clinically had positive nodes though the FNA of " the node was negative.  I discussed the pros and cons, risks and benefits of treatment.  She would like to undergo reconstruction at some point.  An appointment was made for her to return for treatment planning.  I'll plan to give 40.05 Gray in 15 fractions to the chest wall and regional lymphatics.  Thank you very much for asking me to see Mrs. Bryan.       Aliza Levin MD

## 2018-07-12 PROBLEM — C50.811 MALIGNANT NEOPLASM OF OVERLAPPING SITES OF RIGHT BREAST IN FEMALE, ESTROGEN RECEPTOR NEGATIVE: Status: ACTIVE | Noted: 2018-07-12

## 2018-07-12 PROBLEM — Z17.1 MALIGNANT NEOPLASM OF OVERLAPPING SITES OF RIGHT BREAST IN FEMALE, ESTROGEN RECEPTOR NEGATIVE (HCC): Status: ACTIVE | Noted: 2018-07-12

## 2018-07-15 NOTE — PROGRESS NOTES
PROBLEM LIST:  1. yH2M1U0 (stage IIIC) triple negative IDC of the right breast  A) patient presented with a enlarging mass in the right breast for 5-6 months associated with shooting pain.  Biopsy showed high grade invasive ductal carcinoma, ER negative, TN negative, HER-2 negative.  The mass on imaging measures approximately 9.7 cm.  Enlarged abnormal lymph nodes are present on imaging.  FNA of 1 axillary lymph node was negative for malignancy.  PET/CT on 1/12/18 showed large necrotic right breast mass, 2 pathologically enlarged and hypermetabolic intramammary nodes, and no evidence of distant disease.  Breast MRI on 1/17/18 showed evidence of pectoral muscle involvement, as well as several satellite lesions.  B) neoadjuvant ddAC followed by weekly taxol started on 1/23/17.  Taxol stopped after 10 doses due to persistent tachycardia  C) bilateral mastectomy on 6/21/18.  Residual 5 mm high grade IDC in the right breast.  0/4 LN involved.  [id number]    2.  Hypertension  3.  Anxiety  4.  Bell's palsy  5. Sinus tachycardia    Subjective     HISTORY OF PRESENT ILLNESS:   Estefany Bryan returns today for follow-up.  She underwent bilateral mastectomy about 3 weeks ago.  She saw dr. wick and discussed postoperative RT. she says she has been feeling well and feels like she is recovering from her treatment.  The past several months seem like a blur.  She feels like her thinking and memory are starting to clear up in her energy level is improving as well.  She plans to return to work in August.  She continues to have intermittent tachycardia and was in the 130s today.    Past Medical History, Past Surgical History, Social History, Family History have been reviewed and are without significant changes except as mentioned.    Review of Systems   A comprehensive 14 point review of systems was performed and was negative except as mentioned.    Medications:  The current medication list was reviewed in the EMR    ALLERGIES:   "  Allergies   Allergen Reactions   • Chlorhexidine Other (See Comments)     Pt. developed redness and burning sensation after using.   • Codeine GI Intolerance     If take with nexium pt is okay        Objective      /92 Comment: BEEE  Pulse (!) 133   Temp 97.8 °F (36.6 °C) (Temporal Artery )   Resp 18   Ht 174 cm (68.5\")   Wt 88.9 kg (196 lb)   BMI 29.37 kg/m²      Performance Status: 0    General: well appearing female in no acute distress  Neuro: alert and oriented  HEENT: sclera anicteric, oropharynx clear  Lymphatics: no cervical, supraclavicular adenopathy. No palpable axillary adenopathy  Cardiovascular: regular rate and rhythm, no murmurs  Lungs: clear to auscultation bilaterally  Abdomen: soft, nontender, nondistended.  No palpable organomegaly  Extremeties: no lower extremity edema  Skin: no rashes, lesions, bruising, or petechiae  Psych: mood and affect appropriate          Assessment/Plan   Estefany Bryan is a 42 y.o. year old female with [id number] triple negative breast cancer who returns for follow up after bilateral mastectomy.  She had a good response to neoadjuvant chemotherapy with 5 mm of residual disease.    Because she has only minimal residual disease she is not a candidate for adjuvant immunotherapy on clinical trial.  I also think that the benefit of adjuvant Xeloda for her would be fairly minimal given her excellent response to preoperative treatment.  In addition, given her poor tolerance of chemotherapy, I'm not sure that additional systemic chemotherapy is advisable in her situation.    Tachycardia: This continues intermittently.  f/u with Dr. Powell next week.      Follow-up in 3 months with labs.                  Visit time was 25 minutes, greater than 50% spent in counseling      Winsome Romano MD  [facility]    7/17/2018          CC:          "

## 2018-07-17 ENCOUNTER — OFFICE VISIT (OUTPATIENT)
Dept: ONCOLOGY | Facility: CLINIC | Age: 43
End: 2018-07-17

## 2018-07-17 VITALS
TEMPERATURE: 97.8 F | WEIGHT: 196 LBS | DIASTOLIC BLOOD PRESSURE: 92 MMHG | RESPIRATION RATE: 18 BRPM | BODY MASS INDEX: 29.03 KG/M2 | HEART RATE: 133 BPM | SYSTOLIC BLOOD PRESSURE: 127 MMHG | HEIGHT: 69 IN

## 2018-07-17 DIAGNOSIS — Z17.1 MALIGNANT NEOPLASM OF RIGHT BREAST IN FEMALE, ESTROGEN RECEPTOR NEGATIVE, UNSPECIFIED SITE OF BREAST (HCC): Primary | ICD-10-CM

## 2018-07-17 DIAGNOSIS — C50.911 MALIGNANT NEOPLASM OF RIGHT BREAST IN FEMALE, ESTROGEN RECEPTOR NEGATIVE, UNSPECIFIED SITE OF BREAST (HCC): Primary | ICD-10-CM

## 2018-07-17 PROBLEM — C50.919 BREAST CANCER (HCC): Status: RESOLVED | Noted: 2018-06-21 | Resolved: 2018-07-17

## 2018-07-17 PROBLEM — C50.811 MALIGNANT NEOPLASM OF OVERLAPPING SITES OF RIGHT BREAST IN FEMALE, ESTROGEN RECEPTOR NEGATIVE (HCC): Status: RESOLVED | Noted: 2018-07-12 | Resolved: 2018-07-17

## 2018-07-17 PROCEDURE — 99214 OFFICE O/P EST MOD 30 MIN: CPT | Performed by: INTERNAL MEDICINE

## 2018-07-18 ENCOUNTER — HOSPITAL ENCOUNTER (OUTPATIENT)
Dept: RADIATION ONCOLOGY | Facility: HOSPITAL | Age: 43
Discharge: HOME | End: 2018-07-18

## 2018-07-18 PROCEDURE — 77332 RADIATION TREATMENT AID(S): CPT | Performed by: RADIOLOGY

## 2018-07-18 PROCEDURE — 77290 THER RAD SIMULAJ FIELD CPLX: CPT | Performed by: RADIOLOGY

## 2018-07-24 PROCEDURE — 77295 3-D RADIOTHERAPY PLAN: CPT | Performed by: RADIOLOGY

## 2018-07-24 PROCEDURE — 77300 RADIATION THERAPY DOSE PLAN: CPT | Performed by: RADIOLOGY

## 2018-07-24 PROCEDURE — 77334 RADIATION TREATMENT AID(S): CPT | Performed by: RADIOLOGY

## 2018-07-30 ENCOUNTER — HOSPITAL ENCOUNTER (OUTPATIENT)
Dept: RADIATION ONCOLOGY | Facility: HOSPITAL | Age: 43
Discharge: HOME | End: 2018-07-30

## 2018-07-30 PROCEDURE — 77280 THER RAD SIMULAJ FIELD SMPL: CPT | Performed by: RADIOLOGY

## 2018-07-31 ENCOUNTER — OFFICE VISIT (OUTPATIENT)
Dept: CARDIOLOGY | Facility: CLINIC | Age: 43
End: 2018-07-31

## 2018-07-31 ENCOUNTER — HOSPITAL ENCOUNTER (OUTPATIENT)
Dept: RADIATION ONCOLOGY | Facility: HOSPITAL | Age: 43
Discharge: HOME | End: 2018-07-31

## 2018-07-31 VITALS
SYSTOLIC BLOOD PRESSURE: 104 MMHG | WEIGHT: 197.6 LBS | DIASTOLIC BLOOD PRESSURE: 70 MMHG | HEART RATE: 94 BPM | HEIGHT: 68 IN | BODY MASS INDEX: 29.95 KG/M2

## 2018-07-31 VITALS — BODY MASS INDEX: 29.38 KG/M2 | WEIGHT: 196.1 LBS

## 2018-07-31 DIAGNOSIS — R00.0 SINUS TACHYCARDIA: Primary | ICD-10-CM

## 2018-07-31 PROCEDURE — 99213 OFFICE O/P EST LOW 20 MIN: CPT | Performed by: NURSE PRACTITIONER

## 2018-07-31 PROCEDURE — 77412 RADIATION TX DELIVERY LVL 3: CPT | Performed by: RADIOLOGY

## 2018-07-31 NOTE — PROGRESS NOTES
Encounter Date:07/31/2018    Patient ID: Estefany Bryan is a 42 y.o. female who resides in [place], [place]    CC/Reason for visit:  Sinus tachycardia and Fatigue            Estefany Bryan returns today for follow-up of her mildly reduced systolic dysfunction and sinus tachycardia that developed in the setting of chemotherapy treatment with Paclitaxel for breast cancer.  Since her last visit the patient underwent bilateral meniscectomies.  She has completed her chemotherapy but has just started radiation treatments.  At her last visit she Was Prescribed Daylin Luo that her insurance denied it.  She reports that her heart rate on the metoprolol has been better.  She hardly ever gets any higher than 100 bpm.  She does have increased fatigue and shortness of breath but she thinks it is due to activity intolerance because she has been sick from her cancer and has been confined to the bed for the last several months.    Review of Systems   Constitution: Positive for malaise/fatigue, night sweats and weight gain.   Respiratory: Positive for snoring.    Musculoskeletal: Positive for back pain.       The patient's past medical, social, family history and ROS reviewed in the patient's electronic medical record.    Allergies  Chlorhexidine and Codeine    Outpatient Prescriptions Marked as Taking for the 7/31/18 encounter (Office Visit) with ASTRID Ma   Medication Sig Dispense Refill   • ALPRAZolam (XANAX) 1 MG tablet TK 1 T PO TID  2   • amitriptyline (ELAVIL) 50 MG tablet Take one tablet at bedtime for sleep (Patient taking differently: Take 50 mg by mouth Every Night. Take one tablet at bedtime for sleep) 30 tablet 5   • Cholecalciferol (VITAMIN D) 2000 units capsule Take 2,000 Units by mouth As Needed.     • Cyanocobalamin (VITAMIN B-12) 1000 MCG/15ML liquid Take 1,000 mcg by mouth As Needed.     • esomeprazole (nexIUM) 20 MG capsule Take 20 mg by mouth Every Morning Before Breakfast.     • magnesium  "oxide (MAG-OX) 400 MG tablet Take 400 mg by mouth As Needed.     • metoprolol tartrate (LOPRESSOR) 100 MG tablet Take 1 tablet by mouth 2 (Two) Times a Day. Take half tablet or as directed (Patient taking differently: Take 100 mg by mouth 2 (Two) Times a Day.) 60 tablet 2   • Multiple Vitamins-Minerals (MULTIVITAMIN ADULT PO) Take 1 tablet by mouth As Needed.     • ondansetron (ZOFRAN) 8 MG tablet Take 1 tablet by mouth Every 8 (Eight) Hours As Needed for Nausea or Vomiting. 90 tablet 5   • polyethyl glycol-propyl glycol (SYSTANE) 0.4-0.3 % solution ophthalmic solution Administer 1 drop to both eyes As Needed (for itchy eyes).     • prochlorperazine (COMPAZINE) 10 MG tablet Take 1 tablet by mouth Every 6 (Six) Hours As Needed for Nausea or Vomiting. 60 tablet 5   • promethazine (PHENERGAN) 12.5 MG tablet Take 1-2 tablets Q6 hours PRN nausea. (Patient taking differently: Take 12.5 mg by mouth As Needed. Take 1-2 tablets Q6 hours PRN nausea.) 30 tablet 1   • traMADol (ULTRAM) 50 MG tablet Take 1 tablet by mouth Every 6 (Six) Hours As Needed for Moderate Pain . 20 tablet 0   • venlafaxine XR (EFFEXOR-XR) 75 MG 24 hr capsule Take one daily (Patient taking differently: Take 75 mg by mouth Daily. Take one daily) 30 capsule 5         Blood pressure 104/70, pulse 94, height 172.7 cm (68\"), weight 89.6 kg (197 lb 9.6 oz), not currently breastfeeding.  Body mass index is 30.04 kg/m².  There were no vitals filed for this visit.    Physical Exam   Constitutional: She is oriented to person, place, and time. She appears well-developed and well-nourished.   HENT:   Head: Normocephalic and atraumatic.   Eyes: Pupils are equal, round, and reactive to light. No scleral icterus.   Neck: No JVD present. Carotid bruit is not present. No thyromegaly present.   Cardiovascular: Normal rate, regular rhythm, S1 normal and S2 normal.  Exam reveals no gallop.    No murmur heard.  Pulmonary/Chest: Effort normal and breath sounds normal. "   Abdominal: Soft. There is no hepatosplenomegaly. There is no tenderness.   Neurological: She is alert and oriented to person, place, and time.   Skin: Skin is warm and dry. No cyanosis. Nails show no clubbing.   Psychiatric: She has a normal mood and affect. Her behavior is normal.       Data Review:     Procedures    Lab Results   Component Value Date    AST 37 06/15/2018    ALT 50 06/15/2018       No results found for: HGBA1C         Problem List Items Addressed This Visit        Cardiovascular and Mediastinum    Sinus tachycardia - Primary    Overview     · Tachycardia noted after administration of paclitaxel for breast cancer, Spring 2018  · Echo (6/6/2018):  LVEF 45%  · EKG on metoprolol (6/14/2018): Sinus tachycardia at 132 BPM         Current Assessment & Plan     · Continue metoprolol tartrate 100 mg twice a day  · If needed consider adding digoxin             The patient's heart rate has improved since her last visit and is 94 bpm today.  Her heart may be adjusting since she has been off chemotherapy for a while.  I feel at this point the best course of treatment is to allow her to complete her radiation therapy and bring her in for reassessment in 3 months.  If she is still experiencing tachycardia could consider placing a monitor to ensure no arrhythmias and to get an average heart rate and see how well she is controlled on beta blocker therapy.  Could consider adding in digoxin if needed for rate control       · Continue metoprolol 100 mg twice a day  · Follow-up in 3 months for reassessment of her sinus tachycardia, shortness of breath and fatigue  · If needed can order a monitor to evaluate for arrhythmias and ensure therapeutic effectiveness of beta blocker dosage at controlling her heart rate  · Could consider adding in digoxin for rate control if needed in the future.    · May would benefit from a future ischemic evaluation via stress test if symptoms do not resolve after radiation  treatment    Licha Schuster, APRN  7/31/2018

## 2018-08-01 ENCOUNTER — HOSPITAL ENCOUNTER (OUTPATIENT)
Dept: RADIATION ONCOLOGY | Facility: HOSPITAL | Age: 43
Setting detail: RADIATION/ONCOLOGY SERIES
Discharge: HOME | End: 2018-08-01

## 2018-08-01 ENCOUNTER — HOSPITAL ENCOUNTER (OUTPATIENT)
Dept: RADIATION ONCOLOGY | Facility: HOSPITAL | Age: 43
Discharge: HOME | End: 2018-08-01

## 2018-08-01 PROCEDURE — 77412 RADIATION TX DELIVERY LVL 3: CPT | Performed by: RADIOLOGY

## 2018-08-02 ENCOUNTER — HOSPITAL ENCOUNTER (OUTPATIENT)
Dept: RADIATION ONCOLOGY | Facility: HOSPITAL | Age: 43
Discharge: HOME | End: 2018-08-02

## 2018-08-02 PROCEDURE — 77412 RADIATION TX DELIVERY LVL 3: CPT | Performed by: RADIOLOGY

## 2018-08-02 PROCEDURE — 77336 RADIATION PHYSICS CONSULT: CPT | Performed by: RADIOLOGY

## 2018-08-03 ENCOUNTER — HOSPITAL ENCOUNTER (OUTPATIENT)
Dept: RADIATION ONCOLOGY | Facility: HOSPITAL | Age: 43
Discharge: HOME | End: 2018-08-03

## 2018-08-03 PROCEDURE — 77412 RADIATION TX DELIVERY LVL 3: CPT | Performed by: RADIOLOGY

## 2018-08-06 ENCOUNTER — HOSPITAL ENCOUNTER (OUTPATIENT)
Dept: RADIATION ONCOLOGY | Facility: HOSPITAL | Age: 43
Discharge: HOME | End: 2018-08-06

## 2018-08-06 VITALS — BODY MASS INDEX: 29.92 KG/M2 | WEIGHT: 196.8 LBS

## 2018-08-06 PROCEDURE — 77412 RADIATION TX DELIVERY LVL 3: CPT | Performed by: RADIOLOGY

## 2018-08-06 PROCEDURE — 77417 THER RADIOLOGY PORT IMAGE(S): CPT | Performed by: RADIOLOGY

## 2018-08-07 ENCOUNTER — HOSPITAL ENCOUNTER (OUTPATIENT)
Dept: RADIATION ONCOLOGY | Facility: HOSPITAL | Age: 43
Discharge: HOME | End: 2018-08-07

## 2018-08-07 PROCEDURE — 77412 RADIATION TX DELIVERY LVL 3: CPT | Performed by: RADIOLOGY

## 2018-08-08 ENCOUNTER — HOSPITAL ENCOUNTER (OUTPATIENT)
Dept: RADIATION ONCOLOGY | Facility: HOSPITAL | Age: 43
Discharge: HOME | End: 2018-08-08

## 2018-08-08 PROCEDURE — 77412 RADIATION TX DELIVERY LVL 3: CPT | Performed by: RADIOLOGY

## 2018-08-09 ENCOUNTER — HOSPITAL ENCOUNTER (OUTPATIENT)
Dept: RADIATION ONCOLOGY | Facility: HOSPITAL | Age: 43
Discharge: HOME | End: 2018-08-09

## 2018-08-09 PROCEDURE — 77412 RADIATION TX DELIVERY LVL 3: CPT | Performed by: RADIOLOGY

## 2018-08-09 PROCEDURE — 77336 RADIATION PHYSICS CONSULT: CPT | Performed by: RADIOLOGY

## 2018-08-10 ENCOUNTER — HOSPITAL ENCOUNTER (OUTPATIENT)
Dept: RADIATION ONCOLOGY | Facility: HOSPITAL | Age: 43
Discharge: HOME | End: 2018-08-10

## 2018-08-10 PROCEDURE — 77412 RADIATION TX DELIVERY LVL 3: CPT | Performed by: RADIOLOGY

## 2018-08-13 ENCOUNTER — HOSPITAL ENCOUNTER (OUTPATIENT)
Dept: RADIATION ONCOLOGY | Facility: HOSPITAL | Age: 43
Discharge: HOME | End: 2018-08-13

## 2018-08-13 VITALS — WEIGHT: 196.7 LBS | BODY MASS INDEX: 29.91 KG/M2

## 2018-08-13 PROCEDURE — 77417 THER RADIOLOGY PORT IMAGE(S): CPT | Performed by: RADIOLOGY

## 2018-08-13 PROCEDURE — 77412 RADIATION TX DELIVERY LVL 3: CPT | Performed by: RADIOLOGY

## 2018-08-14 ENCOUNTER — HOSPITAL ENCOUNTER (OUTPATIENT)
Dept: RADIATION ONCOLOGY | Facility: HOSPITAL | Age: 43
Discharge: HOME | End: 2018-08-14

## 2018-08-14 PROCEDURE — 77412 RADIATION TX DELIVERY LVL 3: CPT | Performed by: RADIOLOGY

## 2018-08-15 ENCOUNTER — HOSPITAL ENCOUNTER (OUTPATIENT)
Dept: RADIATION ONCOLOGY | Facility: HOSPITAL | Age: 43
Discharge: HOME | End: 2018-08-15

## 2018-08-15 PROCEDURE — 77334 RADIATION TREATMENT AID(S): CPT | Performed by: RADIOLOGY

## 2018-08-15 PROCEDURE — 77412 RADIATION TX DELIVERY LVL 3: CPT | Performed by: RADIOLOGY

## 2018-08-15 PROCEDURE — 77290 THER RAD SIMULAJ FIELD CPLX: CPT | Performed by: RADIOLOGY

## 2018-08-16 ENCOUNTER — HOSPITAL ENCOUNTER (OUTPATIENT)
Dept: RADIATION ONCOLOGY | Facility: HOSPITAL | Age: 43
Discharge: HOME | End: 2018-08-16

## 2018-08-16 PROCEDURE — 77412 RADIATION TX DELIVERY LVL 3: CPT | Performed by: RADIOLOGY

## 2018-08-16 PROCEDURE — 77336 RADIATION PHYSICS CONSULT: CPT | Performed by: RADIOLOGY

## 2018-08-17 ENCOUNTER — HOSPITAL ENCOUNTER (OUTPATIENT)
Dept: RADIATION ONCOLOGY | Facility: HOSPITAL | Age: 43
Discharge: HOME | End: 2018-08-17

## 2018-08-17 PROCEDURE — 77412 RADIATION TX DELIVERY LVL 3: CPT | Performed by: RADIOLOGY

## 2018-08-20 ENCOUNTER — HOSPITAL ENCOUNTER (OUTPATIENT)
Dept: RADIATION ONCOLOGY | Facility: HOSPITAL | Age: 43
Discharge: HOME | End: 2018-08-20

## 2018-08-20 PROCEDURE — 77412 RADIATION TX DELIVERY LVL 3: CPT | Performed by: RADIOLOGY

## 2018-08-21 ENCOUNTER — HOSPITAL ENCOUNTER (OUTPATIENT)
Dept: RADIATION ONCOLOGY | Facility: HOSPITAL | Age: 43
Discharge: HOME | End: 2018-08-21

## 2018-08-21 VITALS — BODY MASS INDEX: 30.41 KG/M2 | WEIGHT: 200 LBS

## 2018-08-21 PROCEDURE — 77412 RADIATION TX DELIVERY LVL 3: CPT | Performed by: RADIOLOGY

## 2018-08-21 PROCEDURE — 77300 RADIATION THERAPY DOSE PLAN: CPT | Performed by: RADIOLOGY

## 2018-08-22 ENCOUNTER — HOSPITAL ENCOUNTER (OUTPATIENT)
Dept: RADIATION ONCOLOGY | Facility: HOSPITAL | Age: 43
Discharge: HOME | End: 2018-08-22

## 2018-08-22 PROCEDURE — 77412 RADIATION TX DELIVERY LVL 3: CPT | Performed by: RADIOLOGY

## 2018-08-23 ENCOUNTER — HOSPITAL ENCOUNTER (OUTPATIENT)
Dept: RADIATION ONCOLOGY | Facility: HOSPITAL | Age: 43
Discharge: HOME | End: 2018-08-23

## 2018-08-23 PROCEDURE — 77412 RADIATION TX DELIVERY LVL 3: CPT | Performed by: RADIOLOGY

## 2018-08-23 PROCEDURE — 77336 RADIATION PHYSICS CONSULT: CPT | Performed by: RADIOLOGY

## 2018-08-24 ENCOUNTER — HOSPITAL ENCOUNTER (OUTPATIENT)
Dept: RADIATION ONCOLOGY | Facility: HOSPITAL | Age: 43
Discharge: HOME | End: 2018-08-24

## 2018-08-24 PROCEDURE — 77412 RADIATION TX DELIVERY LVL 3: CPT | Performed by: RADIOLOGY

## 2018-08-27 ENCOUNTER — HOSPITAL ENCOUNTER (OUTPATIENT)
Dept: RADIATION ONCOLOGY | Facility: HOSPITAL | Age: 43
Discharge: HOME | End: 2018-08-27

## 2018-08-27 PROCEDURE — 77412 RADIATION TX DELIVERY LVL 3: CPT | Performed by: RADIOLOGY

## 2018-08-27 NOTE — THERAPY DISCHARGE NOTE
COMPLETION NOTE    PATIENT:   Estefany Bryan  :    1975  COMPLETION DATE:   18  DIAGNOSIS:   Breast cancer (CMS/[facility])  Staging form: Breast, AJCC 8th Edition  - Clinical: No stage assigned - Unsigned  - Pathologic stage from 2018: No Stage Recommended (ypT1a, pN0, cM1, ER: Negative, OK: Negative, HER2: Negative) - Signed by Aliza Levin MD on 2018     Breast cancer, right (CMS/[facility])  Staging form: Breast, AJCC 8th Edition  - Clinical stage from 1/10/2018: Stage IIIC (cT3, cN1, cM0, G3, ER: Negative, OK: Negative, HER2: Negative) - Signed by Winsome Romano MD on 1/10/2018     The patient is a very pleasant 42 y.o. female  palpated a right breast mass associated with pain.  Biopsy was positive for high-grade invasive ductal carcinoma, ER/OK-negative HER-2/radha negative.  The mass measured 9.7 cm.  She had enlarged lymph nodes present on imaging but FNA of 1 node was negative for malignancy.  Biopsy of this skin was negative for tumor.  PET/CT revealed a large bulky hypermetabolic rounded mass lesion in the lateral aspect of the right breast.  There is central necrosis and maximum SUV of 15.01.  Adjacent to that were two pathologically enlarged lymph nodes in the tail of Rothman and in the upper lateral aspect of the right breast with maximum SUV of 14.16.  She had no distant metastatic disease.  She underwent preoperative chemotherapy by Dr. Romano of [facility] followed by weekly Taxol.  On 2018 she had bilateral mastectomy and sentinel node sampling.  The left breast had a fibroadenoma 6 mm in size the right breast had residual invasive ductal carcinoma, high-grade.  There is a 5 mm focus.  There was no lymphovascular or perineural invasion and margins were negative.  0 of 4 lymph nodes were positive for tumor.  The patient received radiotherapy in our department as follows:    TREATMENT COURSE:   2018 the right chest wall received 40.05 Gy in 15 fractions with 15 MV photons  2018  the tumor bed was boosted with an additional 10 Gy in 5 fractions with 6 MEV electrons        TOLERANCE:   Ms. Bryan did well with treatment until the last week or two when she developed pain in the ribs at the bottom of the treatment area.  They were tender to touch but improving.  I offered to order a CT scan to evaluate but she declined.        DISPOSITION:  At the completion of therapy an appointment was made for her to return on 9/19/2018 11:30 AM.  She knows to call if she has any problems sooner.        Aliza Levin MD    Errors in dictation may reflect use of voice recognition software and not all errors in transcription may have been detected prior to signing.

## 2018-10-04 ENCOUNTER — HOSPITAL ENCOUNTER (OUTPATIENT)
Dept: RADIATION ONCOLOGY | Facility: HOSPITAL | Age: 43
Setting detail: RADIATION/ONCOLOGY SERIES
Discharge: HOME | End: 2018-10-04

## 2018-10-04 ENCOUNTER — TELEPHONE (OUTPATIENT)
Dept: ONCOLOGY | Facility: CLINIC | Age: 43
End: 2018-10-04

## 2018-10-04 NOTE — TELEPHONE ENCOUNTER
----- Message from Darcie Obando sent at 10/3/2018  1:50 PM EDT -----  Regarding: ERIC - PATIENT DOESN'T FEEL GOOD  Contact: [phone]  PATIENT CALLED AND SAID SHE WENT TO [facility] EARLIER THIS WEEK, AND THEY AN ANTIBIOTIC INJECTION, PLUS ORAL ANTIBIOTICS, AND STEROIDS.    SHE STILL FEELS AWFUL, AND WANTED TO KNOW IF SHE SHOULD BE DOING ANYTHING ELSE.

## 2018-10-04 NOTE — TELEPHONE ENCOUNTER
Returned call to patient. Advised that if she is showing no improvement after 2-3 days on antibiotics, she should follow up with PCP for further instruction. Dr Romano is aware.

## 2018-10-15 NOTE — PROGRESS NOTES
Maurice Bryna is a 42 y.o. female who is here today for medication management follow up.    Chief Complaint: Diagnoses and all orders for this visit:    Generalized anxiety disorder    Moderate episode of recurrent major depressive disorder    Insomnia due to mental condition    Other orders  -     venlafaxine XR (EFFEXOR-XR) 75 MG 24 hr capsule; Take one daily  -     Discontinue: amitriptyline (ELAVIL) 50 MG tablet; Take 1-2 tablets at hs  -     amitriptyline (ELAVIL) 50 MG tablet; Take one tablet at bedtime for sleep        History of Present Illness Patient presents by herself for medication management follow-up.  Patient is undergoing neoadjuvant chemotherapy weekly on Taxol for breast cancer.  She is unclear how many more doses she will be getting but after that she'll have bilateral breast mastectomies and that will be followed by radiation.  She reports significant fatigue and has some neuropathy from chemotherapy.  She reports her parents tag teen being in their home helping with the 15-year-old and 11-year-old.  Patient is not working currently.  She reports that she is taking it one week at a time and feels like her medications are working well.  She is sleeping at night and reports she's gained weight because of the steroids she's given.  She rates depression a 3 and anxiety the same.  Reports her children are doing well in the are sharing as much time together as possible.  And she states her parents have been a real blessing couldn't have done it without them she states Denies adverse effects from medications.   (Scales based on 0 - 10 with 10 being the worst)        The following portions of the patient's history were reviewed and updated as appropriate: allergies, current medications, past family history, past medical history, past social history, past surgical history and problem list.    Review of Systemsdenies fever, cough, s/s’s of infection, denies GI/ problems, denies new  Isotretinoin Counseling: Patient should get monthly blood tests, not donate blood, not drive at night if vision affected, not share medication, and not undergo elective surgery for 6 months after tx completed. Side effects reviewed, pt to contact office should one occur. medical issues     Objective   Physical Exam  Weight 87.5 kg (193 lb).    Allergies   Allergen Reactions   • Chlorhexidine Other (See Comments)     Pt. developed redness and burning sensation after using.   • Codeine GI Intolerance       Current Medications:   Current Outpatient Prescriptions   Medication Sig Dispense Refill   • ALPRAZolam (XANAX) 1 MG tablet TK 1 T PO TID  2   • amitriptyline (ELAVIL) 50 MG tablet Take one tablet at bedtime for sleep 30 tablet 5   • Cholecalciferol (VITAMIN D) 2000 units capsule Take 2,000 Units by mouth Daily.     • esomeprazole (nexIUM) 20 MG capsule Take 20 mg by mouth Every Morning Before Breakfast.     • lidocaine-prilocaine (EMLA) 2.5-2.5 % cream Apply  topically As Needed (45-60 minutes prior to port access.  Cover with saran/plastic wrap.). 30 g 3   • Loratadine (CLARITIN) 10 MG capsule Take 1 tablet by mouth Daily.     • magic mouthwash oral suspension Swish and Spit or swallow 1 to 2 Teaspoonfuls (5 to 10ml) by mouth 4 times daily as needed 240 mL 3   • magnesium oxide (MAG-OX) 400 MG tablet Take 400 mg by mouth Daily.     • metoprolol succinate XL (TOPROL-XL) 50 MG 24 hr tablet TK 1 T PO QD  5   • Multiple Vitamins-Minerals (MULTIVITAMIN ADULT PO) Take 1 tablet by mouth Daily.     • ondansetron (ZOFRAN) 8 MG tablet Take 1 tablet by mouth 3 (Three) Times a Day As Needed for Nausea or Vomiting. 60 tablet 5   • ondansetron (ZOFRAN) 8 MG tablet Take 1 tablet by mouth Every 8 (Eight) Hours As Needed for Nausea or Vomiting. 90 tablet 5   • polyethyl glycol-propyl glycol (SYSTANE) 0.4-0.3 % solution ophthalmic solution 1 drop As Needed (for itchy eyes).     • prochlorperazine (COMPAZINE) 10 MG tablet Take 1 tablet by mouth Every 6 (Six) Hours As Needed for Nausea or Vomiting. 60 tablet 5   • promethazine (PHENERGAN) 12.5 MG tablet Take 1-2 tablets Q6 hours PRN nausea. 30 tablet 1   • traMADol (ULTRAM) 50 MG tablet Take 1 tablet by mouth Every 6 (Six) Hours As Needed for  High Dose Vitamin A Pregnancy And Lactation Text: High dose vitamin A therapy is contraindicated during pregnancy and breast feeding. Moderate Pain . 20 tablet 0   • venlafaxine XR (EFFEXOR-XR) 75 MG 24 hr capsule Take one daily 30 capsule 5   • vitamin B-12 (CYANOCOBALAMIN) 1000 MCG tablet Take 1,000 mcg by mouth Daily.       Current Facility-Administered Medications   Medication Dose Route Frequency Provider Last Rate Last Dose   • lidocaine (XYLOCAINE) 1 % injection 5 mL  5 mL Infiltration Once Oanh Clark MD       • lidocaine-EPINEPHrine (XYLOCAINE W/EPI) 1 %-1:[id number] injection 10 mL  10 mL Infiltration Once Oanh Clark MD           Appearance: appropriate  Hygiene:   good  Cooperation:  Cooperative  Eye Contact:  Good  Psychomotor Behavior:  Appropriate  Mood:  within normal limits  Affect:  Appropriate  Hopelessness: Denies  Speech:  Normal  Thought Process:  Linear  Thought Content:  Normal  Suicidal:  None  Homicidal:  None  Hallucinations:  None  Delusion:  None  Memory:  Intact  Orientation:  Person, Place, Time and Situation  Reliability:  fair  Insight:  Fair  Judgement:  Fair  Impulse Control:  Fair  Estimated Intelligence: average range   Physical/Medical Issues:  No       Assessment/Plan   Diagnoses and all orders for this visit:    Generalized anxiety disorder    Moderate episode of recurrent major depressive disorder    Insomnia due to mental condition    Other orders  -     venlafaxine XR (EFFEXOR-XR) 75 MG 24 hr capsule; Take one daily  -     Discontinue: amitriptyline (ELAVIL) 50 MG tablet; Take 1-2 tablets at hs  -     amitriptyline (ELAVIL) 50 MG tablet; Take one tablet at bedtime for sleep    Stable mood on current medication management  Refill Effexor XR 75 mg 1 daily  Refill amitriptyline 50 mg 1 daily at bedtime for sleep  We discussed risks, benefits, and side effects of the above medications and the patient was agreeable with the plan. Patient was educated on the importance of compliance with treatment and follow-up appointments.     Sleep hygiene reviewed, encouraged more whole foods, less processed  Topical Clindamycin Counseling: Patient counseled that this medication may cause skin irritation or allergic reactions.  In the event of skin irritation, the patient was advised to reduce the amount of the drug applied or use it less frequently.   The patient verbalized understanding of the proper use and possible adverse effects of clindamycin.  All of the patient's questions and concerns were addressed. foods, fruits veggies but she is craving carbohydrates   Coping skills reviewed and encouraged positive framing of thoughts    Assisted patient in processing above session content; acknowledged and normalized patient’s thoughts, feelings, and concerns.  Applied  positive coping skills and behavior management in session.  Allowed patient to freely discuss issues without interruption or judgment. Provided safe, confidential environment to facilitate the development of positive therapeutic relationship and encourage open, honest communication. Assisted patient in identifying risk factors which would indicate the need for higher level of care including thoughts to harm self or others and/or self-harming behavior and encouraged patient to contact this office, call 911, or present to the nearest emergency room should any of these events occur. Discussed crisis intervention services and means to access.  Patient adamantly and convincingly denies current suicidal or homicidal ideation or perceptual disturbance.    Instructed to call for questions or concerns and return early if necessary. Crisis plan reviewed including going to the Emergency department.    Return in about 3 months (around 8/14/2018).         Please note that portions of this note were completed with a voice recognition program.  Efforts were made to edit dictation, but occasionally words are mistranscribed.      Topical Sulfur Applications Pregnancy And Lactation Text: This medication is Pregnancy Category C and has an unknown safety profile during pregnancy. It is unknown if this topical medication is excreted in breast milk. Isotretinoin Pregnancy And Lactation Text: This medication is Pregnancy Category X and is considered extremely dangerous during pregnancy. It is unknown if it is excreted in breast milk. High Dose Vitamin A Counseling: Side effects reviewed, pt to contact office should one occur. Topical Retinoid counseling:  Patient advised to apply a pea-sized amount only at bedtime and wait 30 minutes after washing their face before applying.  If too drying, patient may add a non-comedogenic moisturizer. The patient verbalized understanding of the proper use and possible adverse effects of retinoids.  All of the patient's questions and concerns were addressed. Birth Control Pills Counseling: Birth Control Pill Counseling: I discussed with the patient the potential side effects of OCPs including but not limited to increased risk of stroke, heart attack, thrombophlebitis, deep venous thrombosis, hepatic adenomas, breast changes, GI upset, headaches, and depression.  The patient verbalized understanding of the proper use and possible adverse effects of OCPs. All of the patient's questions and concerns were addressed. Tetracycline Pregnancy And Lactation Text: This medication is Pregnancy Category D and not consider safe during pregnancy. It is also excreted in breast milk. Benzoyl Peroxide Pregnancy And Lactation Text: This medication is Pregnancy Category C. It is unknown if benzoyl peroxide is excreted in breast milk. Bactrim Counseling:  I discussed with the patient the risks of sulfa antibiotics including but not limited to GI upset, allergic reaction, drug rash, diarrhea, dizziness, photosensitivity, and yeast infections.  Rarely, more serious reactions can occur including but not limited to aplastic anemia, agranulocytosis, methemoglobinemia, blood dyscrasias, liver or kidney failure, lung infiltrates or desquamative/blistering drug rashes. Dapsone Counseling: I discussed with the patient the risks of dapsone including but not limited to hemolytic anemia, agranulocytosis, rashes, methemoglobinemia, kidney failure, peripheral neuropathy, headaches, GI upset, and liver toxicity.  Patients who start dapsone require monitoring including baseline LFTs and weekly CBCs for the first month, then every month thereafter.  The patient verbalized understanding of the proper use and possible adverse effects of dapsone.  All of the patient's questions and concerns were addressed. Topical Sulfur Applications Counseling: Topical Sulfur Counseling: Patient counseled that this medication may cause skin irritation or allergic reactions.  In the event of skin irritation, the patient was advised to reduce the amount of the drug applied or use it less frequently.   The patient verbalized understanding of the proper use and possible adverse effects of topical sulfur application.  All of the patient's questions and concerns were addressed. Topical Retinoid Pregnancy And Lactation Text: This medication is Pregnancy Category C. It is unknown if this medication is excreted in breast milk. Benzoyl Peroxide Counseling: Patient counseled that medicine may cause skin irritation and bleach clothing.  In the event of skin irritation, the patient was advised to reduce the amount of the drug applied or use it less frequently.   The patient verbalized understanding of the proper use and possible adverse effects of benzoyl peroxide.  All of the patient's questions and concerns were addressed. Spironolactone Pregnancy And Lactation Text: This medication can cause feminization of the male fetus and should be avoided during pregnancy. The active metabolite is also found in breast milk. Tazorac Pregnancy And Lactation Text: This medication is not safe during pregnancy. It is unknown if this medication is excreted in breast milk. Doxycycline Counseling:  Patient counseled regarding possible photosensitivity and increased risk for sunburn.  Patient instructed to avoid sunlight, if possible.  When exposed to sunlight, patients should wear protective clothing, sunglasses, and sunscreen.  The patient was instructed to call the office immediately if the following severe adverse effects occur:  hearing changes, easy bruising/bleeding, severe headache, or vision changes.  The patient verbalized understanding of the proper use and possible adverse effects of doxycycline.  All of the patient's questions and concerns were addressed. Birth Control Pills Pregnancy And Lactation Text: This medication should be avoided if pregnant and for the first 30 days post-partum. Include Pregnancy/Lactation Warning?: No Tetracycline Counseling: Patient counseled regarding possible photosensitivity and increased risk for sunburn.  Patient instructed to avoid sunlight, if possible.  When exposed to sunlight, patients should wear protective clothing, sunglasses, and sunscreen.  The patient was instructed to call the office immediately if the following severe adverse effects occur:  hearing changes, easy bruising/bleeding, severe headache, or vision changes.  The patient verbalized understanding of the proper use and possible adverse effects of tetracycline.  All of the patient's questions and concerns were addressed. Patient understands to avoid pregnancy while on therapy due to potential birth defects. Azithromycin Counseling:  I discussed with the patient the risks of azithromycin including but not limited to GI upset, allergic reaction, drug rash, diarrhea, and yeast infections. Dapsone Pregnancy And Lactation Text: This medication is Pregnancy Category C and is not considered safe during pregnancy or breast feeding. Topical Clindamycin Pregnancy And Lactation Text: This medication is Pregnancy Category B and is considered safe during pregnancy. It is unknown if it is excreted in breast milk. Azithromycin Pregnancy And Lactation Text: This medication is considered safe during pregnancy and is also secreted in breast milk. Bactrim Pregnancy And Lactation Text: This medication is Pregnancy Category D and is known to cause fetal risk.  It is also excreted in breast milk. Doxycycline Pregnancy And Lactation Text: This medication is Pregnancy Category D and not consider safe during pregnancy. It is also excreted in breast milk but is considered safe for shorter treatment courses. Spironolactone Counseling: Patient advised regarding risks of diarrhea, abdominal pain, hyperkalemia, birth defects (for female patients), liver toxicity and renal toxicity. The patient may need blood work to monitor liver and kidney function and potassium levels while on therapy. The patient verbalized understanding of the proper use and possible adverse effects of spironolactone.  All of the patient's questions and concerns were addressed. Erythromycin Counseling:  I discussed with the patient the risks of erythromycin including but not limited to GI upset, allergic reaction, drug rash, diarrhea, increase in liver enzymes, and yeast infections. Tazorac Counseling:  Patient advised that medication is irritating and drying.  Patient may need to apply sparingly and wash off after an hour before eventually leaving it on overnight.  The patient verbalized understanding of the proper use and possible adverse effects of tazorac.  All of the patient's questions and concerns were addressed. Minocycline Counseling: Patient advised regarding possible photosensitivity and discoloration of the teeth, skin, lips, tongue and gums.  Patient instructed to avoid sunlight, if possible.  When exposed to sunlight, patients should wear protective clothing, sunglasses, and sunscreen.  The patient was instructed to call the office immediately if the following severe adverse effects occur:  hearing changes, easy bruising/bleeding, severe headache, or vision changes.  The patient verbalized understanding of the proper use and possible adverse effects of minocycline.  All of the patient's questions and concerns were addressed. Erythromycin Pregnancy And Lactation Text: This medication is Pregnancy Category B and is considered safe during pregnancy. It is also excreted in breast milk. Detail Level: Zone

## 2018-10-17 ENCOUNTER — LAB (OUTPATIENT)
Dept: LAB | Facility: HOSPITAL | Age: 43
End: 2018-10-17

## 2018-10-17 ENCOUNTER — RESEARCH ENCOUNTER (OUTPATIENT)
Dept: OTHER | Facility: OTHER | Age: 43
End: 2018-10-17

## 2018-10-17 ENCOUNTER — OFFICE VISIT (OUTPATIENT)
Dept: ONCOLOGY | Facility: CLINIC | Age: 43
End: 2018-10-17

## 2018-10-17 VITALS
HEART RATE: 73 BPM | WEIGHT: 203 LBS | TEMPERATURE: 97.6 F | SYSTOLIC BLOOD PRESSURE: 118 MMHG | OXYGEN SATURATION: 100 % | HEIGHT: 68 IN | DIASTOLIC BLOOD PRESSURE: 77 MMHG | RESPIRATION RATE: 16 BRPM | BODY MASS INDEX: 30.77 KG/M2

## 2018-10-17 DIAGNOSIS — Z17.1 MALIGNANT NEOPLASM OF RIGHT BREAST IN FEMALE, ESTROGEN RECEPTOR NEGATIVE, UNSPECIFIED SITE OF BREAST (HCC): Primary | ICD-10-CM

## 2018-10-17 DIAGNOSIS — C50.911 MALIGNANT NEOPLASM OF RIGHT BREAST IN FEMALE, ESTROGEN RECEPTOR NEGATIVE, UNSPECIFIED SITE OF BREAST (HCC): ICD-10-CM

## 2018-10-17 DIAGNOSIS — Z17.1 MALIGNANT NEOPLASM OF RIGHT BREAST IN FEMALE, ESTROGEN RECEPTOR NEGATIVE, UNSPECIFIED SITE OF BREAST (HCC): ICD-10-CM

## 2018-10-17 DIAGNOSIS — C50.911 MALIGNANT NEOPLASM OF RIGHT BREAST IN FEMALE, ESTROGEN RECEPTOR NEGATIVE, UNSPECIFIED SITE OF BREAST (HCC): Primary | ICD-10-CM

## 2018-10-17 LAB
ALBUMIN SERPL-MCNC: 4.28 G/DL (ref 3.2–4.8)
ALBUMIN/GLOB SERPL: 2.1 G/DL (ref 1.5–2.5)
ALP SERPL-CCNC: 120 U/L (ref 25–100)
ALT SERPL W P-5'-P-CCNC: 37 U/L (ref 7–40)
ANION GAP SERPL CALCULATED.3IONS-SCNC: 6 MMOL/L (ref 3–11)
AST SERPL-CCNC: 28 U/L (ref 0–33)
BILIRUB SERPL-MCNC: 0.3 MG/DL (ref 0.3–1.2)
BUN BLD-MCNC: 14 MG/DL (ref 9–23)
BUN/CREAT SERPL: 15.4 (ref 7–25)
CALCIUM SPEC-SCNC: 9 MG/DL (ref 8.7–10.4)
CHLORIDE SERPL-SCNC: 108 MMOL/L (ref 99–109)
CO2 SERPL-SCNC: 26 MMOL/L (ref 20–31)
CREAT BLD-MCNC: 0.91 MG/DL (ref 0.6–1.3)
ERYTHROCYTE [DISTWIDTH] IN BLOOD BY AUTOMATED COUNT: 14.7 % (ref 11.3–14.5)
GFR SERPL CREATININE-BSD FRML MDRD: 67 ML/MIN/1.73
GLOBULIN UR ELPH-MCNC: 2 GM/DL
GLUCOSE BLD-MCNC: 93 MG/DL (ref 70–100)
HCT VFR BLD AUTO: 40.6 % (ref 34.5–44)
HGB BLD-MCNC: 13.6 G/DL (ref 11.5–15.5)
LYMPHOCYTES # BLD AUTO: 1.1 10*3/MM3 (ref 0.6–4.8)
LYMPHOCYTES NFR BLD AUTO: 24.9 % (ref 24–44)
MCH RBC QN AUTO: 30.6 PG (ref 27–31)
MCHC RBC AUTO-ENTMCNC: 33.6 G/DL (ref 32–36)
MCV RBC AUTO: 91.1 FL (ref 80–99)
MONOCYTES # BLD AUTO: 0.1 10*3/MM3 (ref 0–1)
MONOCYTES NFR BLD AUTO: 3 % (ref 0–12)
NEUTROPHILS # BLD AUTO: 3.2 10*3/MM3 (ref 1.5–8.3)
NEUTROPHILS NFR BLD AUTO: 72.1 % (ref 41–71)
PLATELET # BLD AUTO: 244 10*3/MM3 (ref 150–450)
PMV BLD AUTO: 8 FL (ref 6–12)
POTASSIUM BLD-SCNC: 4 MMOL/L (ref 3.5–5.5)
PROT SERPL-MCNC: 6.3 G/DL (ref 5.7–8.2)
RBC # BLD AUTO: 4.45 10*6/MM3 (ref 3.89–5.14)
SODIUM BLD-SCNC: 140 MMOL/L (ref 132–146)
WBC NRBC COR # BLD: 4.4 10*3/MM3 (ref 3.5–10.8)

## 2018-10-17 PROCEDURE — 80053 COMPREHEN METABOLIC PANEL: CPT

## 2018-10-17 PROCEDURE — 99214 OFFICE O/P EST MOD 30 MIN: CPT | Performed by: INTERNAL MEDICINE

## 2018-10-17 PROCEDURE — 36415 COLL VENOUS BLD VENIPUNCTURE: CPT

## 2018-10-17 PROCEDURE — 85025 COMPLETE CBC W/AUTO DIFF WBC: CPT

## 2018-10-17 NOTE — PROGRESS NOTES
PROBLEM LIST:  1. 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  Biopsy showed high grade invasive ductal carcinoma, ER negative, VA negative, HER-2 negative.  The mass on imaging measures approximately 9.7 cm.  Enlarged abnormal lymph nodes are present on imaging.  FNA of 1 axillary lymph node was negative for malignancy.  PET/CT on 1/12/18 showed large necrotic right breast mass, 2 pathologically enlarged and hypermetabolic intramammary nodes, and no evidence of distant disease.  Breast MRI on 1/17/18 showed evidence of pectoral muscle involvement, as well as several satellite lesions.  B) neoadjuvant ddAC followed by weekly taxol started on 1/23/17.  Taxol stopped after 10 doses due to persistent tachycardia  C) bilateral mastectomy on 6/21/18.  Residual 5 mm high grade IDC in the right breast.  0/4 LN involved.  [id number]  D) adjuvant radiation completed 8/27/18.  2.  Hypertension  3.  Anxiety  4.  Bell's palsy  5. Sinus tachycardia    Subjective     HISTORY OF PRESENT ILLNESS:   Estefany Bryan returns today for follow-up.  She completed radiation treatment in august.  She says she is doing pretty well.  She did have a upper respiratory infection earlier this month that really knocked her down for several days.  She wonders if this will continue to be the case since she has had chemotherapy in the past.  Otherwise no new complaints.    Past Medical History, Past Surgical History, Social History, Family History have been reviewed and are without significant changes except as mentioned.    Review of Systems   A comprehensive 14 point review of systems was performed and was negative except as mentioned.    Medications:  The current medication list was reviewed in the EMR    ALLERGIES:    Allergies   Allergen Reactions   • Chlorhexidine Other (See Comments)     Pt. developed redness and  "burning sensation after using.   • Codeine GI Intolerance     If take with nexium pt is okay        Objective      /77 Comment: LUE  Pulse 73   Temp 97.6 °F (36.4 °C) (Temporal Artery )   Resp 16   Ht 172.7 cm (68\")   Wt 92.1 kg (203 lb)   SpO2 100% Comment: RA  BMI 30.87 kg/m²      Performance Status: 0    General: well appearing female in no acute distress  Neuro: alert and oriented  HEENT: sclera anicteric, oropharynx clear  Lymphatics: no cervical, supraclavicular adenopathy. No palpable axillary adenopathy  Cardiovascular: regular rate and rhythm, no murmurs  Lungs: clear to auscultation bilaterally  Abdomen: soft, nontender, nondistended.  No palpable organomegaly  Extremeties: no lower extremity edema  Skin: no rashes, lesions, bruising, or petechiae  Psych: mood and affect appropriate    Labs:  Wbc 4.4, hgb 13.6, plt 244      Assessment/Plan   Estefany Bryan is a 43 y.o. year old female with [id number] triple negative breast cancer who returns for follow up.  She's doing well with no evidence of recurrent disease.  I will contact her if there are any concerning findings in her blood work.      Tachycardia: this seems to have improved as she gets further away from her chemotherapy treatment.  She remains on metoprolol.    We discussed to research study options today.  She is eligible for the weight loss intervention trial as well as the aspirin adjuvant trial.  She will meet with the research coordinator to further discuss this today.    Follow-up in 3 months with labs.                  Visit time was 25 minutes, greater than 50% spent in counseling      Winsome Romano MD  [facility]    10/17/2018          CC:          "

## 2018-10-17 NOTE — RESEARCH
Coordinators Sayda and Sara met with patient to discuss the aspirin and BWell studies.  The patient took copies of the consent forms to review per her request.  We will follow up when she returns for her next scheduled visit (with cardiology) in November.  Orig DX was 12/29/2017 so for BWell she must be randomized by 12/29/18.  For aspirin, she has until June 2019.

## 2018-11-27 ENCOUNTER — OFFICE VISIT (OUTPATIENT)
Dept: CARDIOLOGY | Facility: CLINIC | Age: 43
End: 2018-11-27

## 2018-11-27 VITALS
DIASTOLIC BLOOD PRESSURE: 74 MMHG | OXYGEN SATURATION: 98 % | SYSTOLIC BLOOD PRESSURE: 110 MMHG | WEIGHT: 201.4 LBS | BODY MASS INDEX: 30.52 KG/M2 | HEIGHT: 68 IN | HEART RATE: 71 BPM

## 2018-11-27 DIAGNOSIS — R00.0 SINUS TACHYCARDIA: Primary | ICD-10-CM

## 2018-11-27 PROBLEM — Z01.810 PREOPERATIVE CARDIOVASCULAR EXAMINATION: Status: RESOLVED | Noted: 2018-06-14 | Resolved: 2018-11-27

## 2018-11-27 PROCEDURE — 99213 OFFICE O/P EST LOW 20 MIN: CPT | Performed by: INTERNAL MEDICINE

## 2018-11-27 RX ORDER — METOPROLOL TARTRATE 100 MG/1
100 TABLET ORAL 2 TIMES DAILY
Qty: 60 TABLET | Refills: 2 | Status: CANCELLED | OUTPATIENT
Start: 2018-11-27

## 2018-11-27 RX ORDER — FAMOTIDINE 20 MG/1
20 TABLET, FILM COATED ORAL DAILY
COMMUNITY
End: 2019-08-27

## 2018-11-27 RX ORDER — BISOPROLOL FUMARATE 5 MG/1
5 TABLET, FILM COATED ORAL DAILY
Qty: 180 TABLET | Refills: 3 | Status: SHIPPED | OUTPATIENT
Start: 2018-11-27 | End: 2018-11-27

## 2018-11-27 RX ORDER — MAGNESIUM 200 MG
1 TABLET ORAL DAILY
COMMUNITY
End: 2020-06-11

## 2018-11-27 RX ORDER — METOPROLOL SUCCINATE 25 MG/1
25 TABLET, EXTENDED RELEASE ORAL DAILY
Qty: 90 TABLET | Refills: 3 | Status: SHIPPED | OUTPATIENT
Start: 2018-11-27 | End: 2020-06-14

## 2018-11-27 NOTE — PROGRESS NOTES
Encounter Date:11/27/2018    Patient ID: Estefany Bryan is a 43 y.o. female who resides in [place], [place].    CC/Reason for visit:  Sinus Tachycardia             Problem List Items Addressed This Visit        Cardiovascular and Mediastinum    Sinus tachycardia - Primary    Overview     · Tachycardia noted after administration of paclitaxel for breast cancer, Spring 2018  · Echo (6/6/2018):  LVEF 45%  · EKG on metoprolol (6/14/2018): Sinus tachycardia at 132 BPM         Relevant Medications    metoprolol succinate XL (TOPROL-XL) 25 MG 24 hr tablet        43-year-old female with asymptomatic inappropriate sinus tachycardia following treatment for breast cancer with chemotherapy and radiation. Her heart rate seems to be slowing back to normal levels. We will reduce her metoprolol and switch her to a once daily formulation.       · Discontinue metoprolol tartrate and start metoprolol succinate 25 mg daily  · Monitor heart rate regularly.  · Continue current medications as prescribed.  Return in about 6 months (around 5/27/2019).        Estefany Bryan returns to the clinic today for follow-up of her mildly reduced systolic dysfunction and sinus tachycardia that developed in the setting of chemotherapy treatment with Paclitaxel for breast cancer. She had a bilateral masectomy on 6/21/2018. She reports that she is doing well today from a cardiovascular standpoint. She has feeling more fatigued than typical, especially from her chemotherapy. She report that her heart rate has been doing well.        Review of Systems   Constitution: Positive for malaise/fatigue, night sweats and weight gain. Negative for weakness.   Eyes: Negative for vision loss in left eye and vision loss in right eye.   Cardiovascular: Negative for chest pain, dyspnea on exertion, near-syncope, orthopnea, palpitations, paroxysmal nocturnal dyspnea and syncope.        Heartburn   Skin: Positive for itching.   Musculoskeletal: Positive for  arthritis and back pain. Negative for myalgias.   Neurological: Positive for focal weakness. Negative for brief paralysis, excessive daytime sleepiness, numbness and paresthesias.   All other systems reviewed and are negative.      The patient's past medical, social, family history and ROS reviewed in the patient's electronic medical record.    Allergies  Chlorhexidine and Codeine    Outpatient Medications Marked as Taking for the 11/27/18 encounter (Office Visit) with Erick Powell IV, MD   Medication Sig Dispense Refill   • ALPRAZolam (XANAX) 1 MG tablet TK 1 T PO TID  2   • Cholecalciferol (VITAMIN D3) 5000 units capsule capsule Take 5,000 Units by mouth Daily.     • Cyanocobalamin (VITAMIN B-12) 1000 MCG/15ML liquid Take 5,000 mcg by mouth As Needed.     • famotidine (PEPCID) 20 MG tablet Take 20 mg by mouth Daily.     • Magnesium 200 MG tablet Take 1 tablet by mouth Daily.     • Multiple Vitamins-Minerals (MULTIVITAMIN ADULT PO) Take 1 tablet by mouth Daily.     • ondansetron (ZOFRAN) 8 MG tablet Take 1 tablet by mouth Every 8 (Eight) Hours As Needed for Nausea or Vomiting. 90 tablet 5   • polyethyl glycol-propyl glycol (SYSTANE) 0.4-0.3 % solution ophthalmic solution Administer 1 drop to both eyes As Needed (for itchy eyes).     • prochlorperazine (COMPAZINE) 10 MG tablet Take 1 tablet by mouth Every 6 (Six) Hours As Needed for Nausea or Vomiting. 60 tablet 5   • promethazine (PHENERGAN) 12.5 MG tablet Take 1-2 tablets Q6 hours PRN nausea. (Patient taking differently: Take 12.5 mg by mouth As Needed. Take 1-2 tablets Q6 hours PRN nausea.) 30 tablet 1   • topiramate (TOPAMAX) 50 MG tablet Take 50 mg by mouth 2 (Two) Times a Day.     • venlafaxine XR (EFFEXOR-XR) 75 MG 24 hr capsule Take one daily (Patient taking differently: Take 75 mg by mouth Daily. Take one daily) 30 capsule 5   • [DISCONTINUED] metoprolol tartrate (LOPRESSOR) 100 MG tablet Take 1 tablet by mouth 2 (Two) Times a Day. Take half  tablet or as directed (Patient taking differently: Take 100 mg by mouth 2 (Two) Times a Day.) 60 tablet 2   • [DISCONTINUED] traMADol (ULTRAM) 50 MG tablet Take 1 tablet by mouth Every 6 (Six) Hours As Needed for Moderate Pain . 20 tablet 0       Vitals:    11/27/18 1142   BP: 110/74   Pulse: 71   SpO2: 98%       Physical Exam   Constitutional: She is oriented to person, place, and time. She appears well-developed and well-nourished.   HENT:   Head: Normocephalic and atraumatic.   Eyes: Pupils are equal, round, and reactive to light. No scleral icterus.   Neck: No JVD present. Carotid bruit is not present. No thyromegaly present.   Cardiovascular: Normal rate and regular rhythm. Exam reveals no gallop.   No murmur heard.  Pulmonary/Chest: Effort normal and breath sounds normal.   Abdominal: Soft. She exhibits no distension. There is no hepatosplenomegaly.   Musculoskeletal: She exhibits no edema.   Neurological: She is alert and oriented to person, place, and time.   Skin: Skin is warm and dry.   Psychiatric: She has a normal mood and affect. Her behavior is normal.       Data Review:     Procedures      Lab Results   Component Value Date    GLUCOSE 93 10/17/2018    BUN 14 10/17/2018    CREATININE 0.91 10/17/2018    EGFRIFNONA 67 10/17/2018    BCR 15.4 10/17/2018    K 4.0 10/17/2018    CO2 26.0 10/17/2018    CALCIUM 9.0 10/17/2018    ALBUMIN 4.28 10/17/2018    LABIL2 1.4 08/01/2015    AST 28 10/17/2018    ALT 37 10/17/2018     Lab Results   Component Value Date    WBC 4.40 10/17/2018    HGB 13.6 10/17/2018    HCT 40.6 10/17/2018    MCV 91.1 10/17/2018     10/17/2018     Lab Results   Component Value Date    TSH 2.410 06/15/2018       Scribed for Erick Powell IV, MD by Jayne Ibarra. 11/27/2018  6:15 PM    Erick Powell IV, MD  11/27/2018

## 2018-12-24 ENCOUNTER — TELEPHONE (OUTPATIENT)
Dept: RADIATION ONCOLOGY | Facility: HOSPITAL | Age: 43
End: 2018-12-24

## 2019-01-23 ENCOUNTER — HOSPITAL ENCOUNTER (OUTPATIENT)
Dept: RADIATION ONCOLOGY | Facility: HOSPITAL | Age: 44
Setting detail: RADIATION/ONCOLOGY SERIES
Discharge: HOME | End: 2019-01-23

## 2019-01-23 ENCOUNTER — OFFICE VISIT (OUTPATIENT)
Dept: ONCOLOGY | Facility: CLINIC | Age: 44
End: 2019-01-23

## 2019-01-23 ENCOUNTER — LAB (OUTPATIENT)
Dept: LAB | Facility: HOSPITAL | Age: 44
End: 2019-01-23

## 2019-01-23 ENCOUNTER — OFFICE VISIT (OUTPATIENT)
Dept: RADIATION ONCOLOGY | Facility: HOSPITAL | Age: 44
End: 2019-01-23

## 2019-01-23 VITALS
BODY MASS INDEX: 31.07 KG/M2 | WEIGHT: 205 LBS | RESPIRATION RATE: 16 BRPM | HEART RATE: 92 BPM | DIASTOLIC BLOOD PRESSURE: 79 MMHG | OXYGEN SATURATION: 96 % | TEMPERATURE: 98.2 F | SYSTOLIC BLOOD PRESSURE: 117 MMHG | HEIGHT: 68 IN

## 2019-01-23 VITALS
HEART RATE: 97 BPM | HEIGHT: 68 IN | DIASTOLIC BLOOD PRESSURE: 80 MMHG | WEIGHT: 206 LBS | BODY MASS INDEX: 31.22 KG/M2 | TEMPERATURE: 98.6 F | RESPIRATION RATE: 16 BRPM | SYSTOLIC BLOOD PRESSURE: 120 MMHG

## 2019-01-23 DIAGNOSIS — C50.911 MALIGNANT NEOPLASM OF RIGHT BREAST IN FEMALE, ESTROGEN RECEPTOR NEGATIVE, UNSPECIFIED SITE OF BREAST (HCC): ICD-10-CM

## 2019-01-23 DIAGNOSIS — C50.911 MALIGNANT NEOPLASM OF RIGHT BREAST IN FEMALE, ESTROGEN RECEPTOR NEGATIVE, UNSPECIFIED SITE OF BREAST (HCC): Primary | ICD-10-CM

## 2019-01-23 DIAGNOSIS — Z17.1 MALIGNANT NEOPLASM OF RIGHT BREAST IN FEMALE, ESTROGEN RECEPTOR NEGATIVE, UNSPECIFIED SITE OF BREAST (HCC): ICD-10-CM

## 2019-01-23 DIAGNOSIS — Z17.1 MALIGNANT NEOPLASM OF RIGHT BREAST IN FEMALE, ESTROGEN RECEPTOR NEGATIVE, UNSPECIFIED SITE OF BREAST (HCC): Primary | ICD-10-CM

## 2019-01-23 DIAGNOSIS — Z85.3 HISTORY OF BREAST CANCER: Primary | ICD-10-CM

## 2019-01-23 LAB
ALBUMIN SERPL-MCNC: 4.21 G/DL (ref 3.2–4.8)
ALBUMIN/GLOB SERPL: 2 G/DL (ref 1.5–2.5)
ALP SERPL-CCNC: 104 U/L (ref 25–100)
ALT SERPL W P-5'-P-CCNC: 29 U/L (ref 7–40)
ANION GAP SERPL CALCULATED.3IONS-SCNC: 6 MMOL/L (ref 3–11)
AST SERPL-CCNC: 20 U/L (ref 0–33)
BILIRUB SERPL-MCNC: 0.4 MG/DL (ref 0.3–1.2)
BUN BLD-MCNC: 10 MG/DL (ref 9–23)
BUN/CREAT SERPL: 11.9 (ref 7–25)
CALCIUM SPEC-SCNC: 8.6 MG/DL (ref 8.7–10.4)
CHLORIDE SERPL-SCNC: 105 MMOL/L (ref 99–109)
CO2 SERPL-SCNC: 27 MMOL/L (ref 20–31)
CREAT BLD-MCNC: 0.84 MG/DL (ref 0.6–1.3)
ERYTHROCYTE [DISTWIDTH] IN BLOOD BY AUTOMATED COUNT: 13 % (ref 11.3–14.5)
GFR SERPL CREATININE-BSD FRML MDRD: 74 ML/MIN/1.73
GLOBULIN UR ELPH-MCNC: 2.1 GM/DL
GLUCOSE BLD-MCNC: 98 MG/DL (ref 70–100)
HCT VFR BLD AUTO: 39.9 % (ref 34.5–44)
HGB BLD-MCNC: 13.3 G/DL (ref 11.5–15.5)
LYMPHOCYTES # BLD AUTO: 1.2 10*3/MM3 (ref 0.6–4.8)
LYMPHOCYTES NFR BLD AUTO: 26.7 % (ref 24–44)
MCH RBC QN AUTO: 31.2 PG (ref 27–31)
MCHC RBC AUTO-ENTMCNC: 33.5 G/DL (ref 32–36)
MCV RBC AUTO: 93.1 FL (ref 80–99)
MONOCYTES # BLD AUTO: 0.2 10*3/MM3 (ref 0–1)
MONOCYTES NFR BLD AUTO: 4.6 % (ref 0–12)
NEUTROPHILS # BLD AUTO: 3.1 10*3/MM3 (ref 1.5–8.3)
NEUTROPHILS NFR BLD AUTO: 68.7 % (ref 41–71)
PLATELET # BLD AUTO: 281 10*3/MM3 (ref 150–450)
PMV BLD AUTO: 7.8 FL (ref 6–12)
POTASSIUM BLD-SCNC: 4 MMOL/L (ref 3.5–5.5)
PROT SERPL-MCNC: 6.3 G/DL (ref 5.7–8.2)
RBC # BLD AUTO: 4.28 10*6/MM3 (ref 3.89–5.14)
SODIUM BLD-SCNC: 138 MMOL/L (ref 132–146)
WBC NRBC COR # BLD: 4.5 10*3/MM3 (ref 3.5–10.8)

## 2019-01-23 PROCEDURE — 80053 COMPREHEN METABOLIC PANEL: CPT

## 2019-01-23 PROCEDURE — 36415 COLL VENOUS BLD VENIPUNCTURE: CPT

## 2019-01-23 PROCEDURE — 99213 OFFICE O/P EST LOW 20 MIN: CPT | Performed by: INTERNAL MEDICINE

## 2019-01-23 PROCEDURE — 85025 COMPLETE CBC W/AUTO DIFF WBC: CPT

## 2019-01-23 PROCEDURE — G0463 HOSPITAL OUTPT CLINIC VISIT: HCPCS

## 2019-01-23 NOTE — PROGRESS NOTES
PROBLEM LIST:  1. 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  Biopsy showed high grade invasive ductal carcinoma, ER negative, SD negative, HER-2 negative.  The mass on imaging measures approximately 9.7 cm.  Enlarged abnormal lymph nodes are present on imaging.  FNA of 1 axillary lymph node was negative for malignancy.  PET/CT on 1/12/18 showed large necrotic right breast mass, 2 pathologically enlarged and hypermetabolic intramammary nodes, and no evidence of distant disease.  Breast MRI on 1/17/18 showed evidence of pectoral muscle involvement, as well as several satellite lesions.  B) neoadjuvant ddAC followed by weekly taxol started on 1/23/17.  Taxol stopped after 10 doses due to persistent tachycardia  C) bilateral mastectomy on 6/21/18.  Residual 5 mm high grade IDC in the right breast.  0/4 LN involved.  [id number]  D) adjuvant radiation completed 8/27/18.  2.  Hypertension  3.  Anxiety  4.  Bell's palsy  5. Sinus tachycardia    Subjective      CC: breast cancer    HISTORY OF PRESENT ILLNESS:   Estefany Bryan returns today for follow-up.  She says she has been feeling pretty well.  She has no new aches or pains and no other complaints today.  She says her energy level has been improving since finishing her treatment but it's not quite back to her baseline.  She is working full time but anticipates increasing her hours even more with tax season coming.    Past Medical History, Past Surgical History, Social History, Family History have been reviewed and are without significant changes except as mentioned.    Review of Systems   A comprehensive 14 point review of systems was performed and was negative except as mentioned.    Medications:  The current medication list was reviewed in the EMR    ALLERGIES:    Allergies   Allergen Reactions   • Chlorhexidine Other (See Comments)      "Pt. developed redness and burning sensation after using.   • Codeine GI Intolerance     If take with nexium pt is okay        Objective      /79   Pulse 92   Temp 98.2 °F (36.8 °C)   Resp 16   Ht 172.7 cm (68\")   Wt 93 kg (205 lb)   SpO2 96%   BMI 31.17 kg/m²      Performance Status: 0    General: well appearing female in no acute distress  Neuro: alert and oriented  HEENT: sclera anicteric, oropharynx clear  Lymphatics: no cervical, supraclavicular adenopathy. No palpable axillary adenopathy  Cardiovascular: regular rate and rhythm, no murmurs  Lungs: clear to auscultation bilaterally  Abdomen: soft, nontender, nondistended.  No palpable organomegaly  Extremeties: no lower extremity edema  Skin: no rashes, lesions, bruising, or petechiae  Psych: mood and affect appropriate    Labs:  Normal CBC.  cmp shows alk phos 104, otherwise normal.      Assessment/Plan   Estefany Bryan is a 43 y.o. year old female with [id number] triple negative breast cancer who returns for follow up.   She continues to do well with no evidence of recurrent disease.    Tachycardia: improving, remains on beta blocker.      Follow-up in 3 months with labs.  After that we will start to space out visits to every 4 months.              I spent 15 minutes with the patient. I spent > 50% percent of this time counseling and discussing prognosis, diagnostic testing, evaluation, current status and management.    Winsome Romano MD  [facility]    1/23/2019          CC:          "

## 2019-02-10 PROBLEM — Z85.3 HISTORY OF BREAST CANCER: Status: ACTIVE | Noted: 2019-02-10

## 2019-02-27 ENCOUNTER — APPOINTMENT (OUTPATIENT)
Dept: RADIATION ONCOLOGY | Facility: HOSPITAL | Age: 44
End: 2019-02-27

## 2019-05-01 ENCOUNTER — LAB (OUTPATIENT)
Dept: LAB | Facility: HOSPITAL | Age: 44
End: 2019-05-01

## 2019-05-01 ENCOUNTER — OFFICE VISIT (OUTPATIENT)
Dept: ONCOLOGY | Facility: CLINIC | Age: 44
End: 2019-05-01

## 2019-05-01 VITALS
OXYGEN SATURATION: 98 % | HEIGHT: 68 IN | HEART RATE: 86 BPM | TEMPERATURE: 97.7 F | RESPIRATION RATE: 16 BRPM | SYSTOLIC BLOOD PRESSURE: 127 MMHG | WEIGHT: 209 LBS | DIASTOLIC BLOOD PRESSURE: 84 MMHG | BODY MASS INDEX: 31.67 KG/M2

## 2019-05-01 DIAGNOSIS — C50.911 MALIGNANT NEOPLASM OF RIGHT BREAST IN FEMALE, ESTROGEN RECEPTOR NEGATIVE, UNSPECIFIED SITE OF BREAST (HCC): Primary | ICD-10-CM

## 2019-05-01 DIAGNOSIS — Z17.1 MALIGNANT NEOPLASM OF RIGHT BREAST IN FEMALE, ESTROGEN RECEPTOR NEGATIVE, UNSPECIFIED SITE OF BREAST (HCC): Primary | ICD-10-CM

## 2019-05-01 DIAGNOSIS — C50.911 MALIGNANT NEOPLASM OF RIGHT BREAST IN FEMALE, ESTROGEN RECEPTOR NEGATIVE, UNSPECIFIED SITE OF BREAST (HCC): ICD-10-CM

## 2019-05-01 DIAGNOSIS — Z17.1 MALIGNANT NEOPLASM OF RIGHT BREAST IN FEMALE, ESTROGEN RECEPTOR NEGATIVE, UNSPECIFIED SITE OF BREAST (HCC): ICD-10-CM

## 2019-05-01 LAB
ALBUMIN SERPL-MCNC: 4.2 G/DL (ref 3.5–5.2)
ALBUMIN/GLOB SERPL: 1.3 G/DL
ALP SERPL-CCNC: 104 U/L (ref 39–117)
ALT SERPL W P-5'-P-CCNC: 23 U/L (ref 1–33)
ANION GAP SERPL CALCULATED.3IONS-SCNC: 11 MMOL/L
AST SERPL-CCNC: 17 U/L (ref 1–32)
BILIRUB SERPL-MCNC: 0.2 MG/DL (ref 0.2–1.2)
BUN BLD-MCNC: 11 MG/DL (ref 6–20)
BUN/CREAT SERPL: 13.1 (ref 7–25)
CALCIUM SPEC-SCNC: 9.3 MG/DL (ref 8.6–10.5)
CHLORIDE SERPL-SCNC: 101 MMOL/L (ref 98–107)
CO2 SERPL-SCNC: 26 MMOL/L (ref 22–29)
CREAT BLD-MCNC: 0.84 MG/DL (ref 0.57–1)
ERYTHROCYTE [DISTWIDTH] IN BLOOD BY AUTOMATED COUNT: 14.3 % (ref 12.3–15.4)
GFR SERPL CREATININE-BSD FRML MDRD: 74 ML/MIN/1.73
GLOBULIN UR ELPH-MCNC: 3.2 GM/DL
GLUCOSE BLD-MCNC: 96 MG/DL (ref 65–99)
HCT VFR BLD AUTO: 40.8 % (ref 34–46.6)
HGB BLD-MCNC: 14 G/DL (ref 12–15.9)
LYMPHOCYTES # BLD AUTO: 1.7 10*3/MM3 (ref 0.7–3.1)
LYMPHOCYTES NFR BLD AUTO: 23.9 % (ref 19.6–45.3)
MCH RBC QN AUTO: 31.7 PG (ref 26.6–33)
MCHC RBC AUTO-ENTMCNC: 34.2 G/DL (ref 31.5–35.7)
MCV RBC AUTO: 92.5 FL (ref 79–97)
MONOCYTES # BLD AUTO: 0.5 10*3/MM3 (ref 0.1–0.9)
MONOCYTES NFR BLD AUTO: 7 % (ref 5–12)
NEUTROPHILS # BLD AUTO: 4.9 10*3/MM3 (ref 1.7–7)
NEUTROPHILS NFR BLD AUTO: 69.1 % (ref 42.7–76)
PLATELET # BLD AUTO: 277 10*3/MM3 (ref 140–450)
PMV BLD AUTO: 8.3 FL (ref 6–12)
POTASSIUM BLD-SCNC: 4.1 MMOL/L (ref 3.5–5.2)
PROT SERPL-MCNC: 7.4 G/DL (ref 6–8.5)
RBC # BLD AUTO: 4.41 10*6/MM3 (ref 3.77–5.28)
SODIUM BLD-SCNC: 138 MMOL/L (ref 136–145)
WBC NRBC COR # BLD: 7.1 10*3/MM3 (ref 3.4–10.8)

## 2019-05-01 PROCEDURE — 85025 COMPLETE CBC W/AUTO DIFF WBC: CPT

## 2019-05-01 PROCEDURE — 99213 OFFICE O/P EST LOW 20 MIN: CPT | Performed by: INTERNAL MEDICINE

## 2019-05-01 PROCEDURE — 80053 COMPREHEN METABOLIC PANEL: CPT

## 2019-05-01 PROCEDURE — 36415 COLL VENOUS BLD VENIPUNCTURE: CPT

## 2019-05-01 RX ORDER — AMITRIPTYLINE HYDROCHLORIDE 25 MG/1
TABLET, FILM COATED ORAL
Refills: 5 | COMMUNITY
Start: 2019-04-22 | End: 2019-10-16

## 2019-05-01 NOTE — PROGRESS NOTES
PROBLEM LIST:  1. 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  Biopsy showed high grade invasive ductal carcinoma, ER negative, ID negative, HER-2 negative.  The mass on imaging measures approximately 9.7 cm.  Enlarged abnormal lymph nodes are present on imaging.  FNA of 1 axillary lymph node was negative for malignancy.  PET/CT on 1/12/18 showed large necrotic right breast mass, 2 pathologically enlarged and hypermetabolic intramammary nodes, and no evidence of distant disease.  Breast MRI on 1/17/18 showed evidence of pectoral muscle involvement, as well as several satellite lesions.  B) neoadjuvant ddAC followed by weekly taxol started on 1/23/17.  Taxol stopped after 10 doses due to persistent tachycardia  C) bilateral mastectomy on 6/21/18.  Residual 5 mm high grade IDC in the right breast.  0/4 LN involved.  [id number]  D) adjuvant radiation completed 8/27/18.  2.  Hypertension  3.  Anxiety  4.  Bell's palsy  5. Sinus tachycardia    Subjective      CC: breast cancer    HISTORY OF PRESENT ILLNESS:   Estefany Bryan returns today for follow-up.  She says she has been feeling pretty well.  She reports that recently she has been able to start wearing make-up again and her skin does not staying when she sweats.  Because of this she hopes to start exercising more regularly.  She did gain weight during her treatment and plans to start working on losing it.  Otherwise no new complaints or concerns.    Past Medical History, Past Surgical History, Social History, Family History have been reviewed and are without significant changes except as mentioned.    Review of Systems   A comprehensive 14 point review of systems was performed and was negative except as mentioned.    Medications:  The current medication list was reviewed in the EMR    ALLERGIES:    Allergies   Allergen Reactions   •  "Chlorhexidine Other (See Comments)     Pt. developed redness and burning sensation after using.   • Codeine GI Intolerance     If take with nexium pt is okay        Objective      /84 Comment: MARIA L  Pulse 86   Temp 97.7 °F (36.5 °C) (Temporal)   Resp 16   Ht 172.7 cm (68\")   Wt 94.8 kg (209 lb)   SpO2 98% Comment: RA  BMI 31.78 kg/m²      Performance Status: 0    General: well appearing female in no acute distress  Neuro: alert and oriented  HEENT: sclera anicteric, oropharynx clear  Lymphatics: no cervical, supraclavicular adenopathy. No palpable axillary adenopathy  Chest: Status post bilateral mastectomy.  No palpable masses or lesions  Cardiovascular: regular rate and rhythm, no murmurs  Lungs: clear to auscultation bilaterally  Abdomen: soft, nontender, nondistended.  No palpable organomegaly  Extremeties: no lower extremity edema  Skin: no rashes, lesions, bruising, or petechiae  Psych: mood and affect appropriate    Labs:  Normal CBC.       Assessment/Plan   Estefany Bryan is a 43 y.o. year old female with [id number] triple negative breast cancer who returns for follow up.   She continues to do well with no evidence of recurrent disease.    Tachycardia: remains on beta blocker.  Follow-up with Dr. Powell later this month.    We discussed that there is some evidence that a plant-based diet may be beneficial for cancer patients.  I would recommend a Mediterranean type diet with an emphasis on whole grains and fresh vegetables.      Follow-up in 4 months with labs.              I spent 15 minutes with the patient. I spent > 50% percent of this time counseling and discussing prognosis, diagnostic testing, evaluation, current status and management.    Winsome Romano MD  [facility]    5/1/2019          CC:          "

## 2019-08-18 ENCOUNTER — APPOINTMENT (OUTPATIENT)
Dept: PREADMISSION TESTING | Facility: HOSPITAL | Age: 44
End: 2019-08-18

## 2019-08-18 LAB
B-HCG UR QL: NEGATIVE
BACTERIA UR QL AUTO: ABNORMAL /HPF
BILIRUB UR QL STRIP: NEGATIVE
CLARITY UR: CLEAR
COLOR UR: YELLOW
DEPRECATED RDW RBC AUTO: 42.6 FL (ref 37–54)
ERYTHROCYTE [DISTWIDTH] IN BLOOD BY AUTOMATED COUNT: 12.3 % (ref 12.3–15.4)
GLUCOSE UR STRIP-MCNC: NEGATIVE MG/DL
HCT VFR BLD AUTO: 42.8 % (ref 34–46.6)
HGB BLD-MCNC: 14.2 G/DL (ref 12–15.9)
HGB UR QL STRIP.AUTO: NEGATIVE
HYALINE CASTS UR QL AUTO: ABNORMAL /LPF
KETONES UR QL STRIP: NEGATIVE
LEUKOCYTE ESTERASE UR QL STRIP.AUTO: ABNORMAL
MCH RBC QN AUTO: 30.9 PG (ref 26.6–33)
MCHC RBC AUTO-ENTMCNC: 33.2 G/DL (ref 31.5–35.7)
MCV RBC AUTO: 93 FL (ref 79–97)
NITRITE UR QL STRIP: NEGATIVE
PH UR STRIP.AUTO: 5.5 [PH] (ref 5–8)
PLATELET # BLD AUTO: 294 10*3/MM3 (ref 140–450)
PMV BLD AUTO: 10.6 FL (ref 6–12)
POTASSIUM BLD-SCNC: 4.5 MMOL/L (ref 3.5–5.2)
PROT UR QL STRIP: NEGATIVE
RBC # BLD AUTO: 4.6 10*6/MM3 (ref 3.77–5.28)
RBC # UR: ABNORMAL /HPF
REF LAB TEST METHOD: ABNORMAL
SP GR UR STRIP: 1.02 (ref 1–1.03)
SQUAMOUS #/AREA URNS HPF: ABNORMAL /HPF
UROBILINOGEN UR QL STRIP: ABNORMAL
WBC NRBC COR # BLD: 5.79 10*3/MM3 (ref 3.4–10.8)
WBC UR QL AUTO: ABNORMAL /HPF

## 2019-08-18 PROCEDURE — 85027 COMPLETE CBC AUTOMATED: CPT | Performed by: OBSTETRICS & GYNECOLOGY

## 2019-08-18 PROCEDURE — 36415 COLL VENOUS BLD VENIPUNCTURE: CPT

## 2019-08-18 PROCEDURE — 93005 ELECTROCARDIOGRAM TRACING: CPT

## 2019-08-18 PROCEDURE — 81001 URINALYSIS AUTO W/SCOPE: CPT | Performed by: OBSTETRICS & GYNECOLOGY

## 2019-08-18 PROCEDURE — 84132 ASSAY OF SERUM POTASSIUM: CPT | Performed by: OBSTETRICS & GYNECOLOGY

## 2019-08-18 PROCEDURE — 93010 ELECTROCARDIOGRAM REPORT: CPT | Performed by: INTERNAL MEDICINE

## 2019-08-18 PROCEDURE — 81025 URINE PREGNANCY TEST: CPT | Performed by: OBSTETRICS & GYNECOLOGY

## 2019-08-18 NOTE — DISCHARGE INSTRUCTIONS
The following information and instructions were given:    Nothing to eat or drink after midnight except sips of water with routine prescribed medication (except blood thinner, certain blood pressure medications, diabetes, or weight reducing medication) unless otherwise instructed by your physician.  Do not eat, drink, smoke or chew gum after midnight the night before surgery. This also includes no mints.    EXCEPTION: ERAS patients Patient instructed to drink 20 ounces (or until full) of Gatorade and it needs to be completed 1 hour before given arrival time on the day of surgery. (NO RED Gatorade)    Patient verbalized understanding.      DO NOT shave for two days before your procedure.  Do not wear makeup.      DO NOT wear fingernail polish (gel/regular) and/or acrylic/artificial nails on the day of surgery.   If a patient had recent manicure and would rather not remove polish or artificial nails, then the minimum requirement is that the polish/artificial nails must be removed from the middle finger on each hand.      If patient was having surgery on an upper extremity, then the patient was instructed that fingernail polish/artificial fingernails must be removed for surgery.  NO EXCEPTIONS.      If patient was having surgery on a lower extremity, then the patient was instructed that toenail polish on both extremities must be removed for surgery.  NO EXCEPTIONS.    Remove all jewelry (advised to go to jeweler if unable to remove).  Jewelry especially rings can no longer be taped for surgery.    Leave anything you consider valuable at home.      Bring the following with you (if applicable)   -picture ID and insurance cards   -Co-pay/deductible required by insurance   -Medications in the original bottles (not a list) including all over-the-counter meds     Education booklet, brochure, and/or given to patient.    Patient must have a  for transportation home after procedure.  It must be an   adult that will take  responsibility for care for 24 hours after surgery.

## 2019-08-22 ENCOUNTER — LAB REQUISITION (OUTPATIENT)
Dept: LAB | Facility: HOSPITAL | Age: 44
End: 2019-08-22

## 2019-08-22 DIAGNOSIS — R10.2 PELVIC AND PERINEAL PAIN: ICD-10-CM

## 2019-08-22 PROCEDURE — 88305 TISSUE EXAM BY PATHOLOGIST: CPT | Performed by: OBSTETRICS & GYNECOLOGY

## 2019-08-23 LAB
CYTO UR: NORMAL
LAB AP CASE REPORT: NORMAL
LAB AP CLINICAL INFORMATION: NORMAL
PATH REPORT.FINAL DX SPEC: NORMAL
PATH REPORT.GROSS SPEC: NORMAL

## 2019-08-27 ENCOUNTER — APPOINTMENT (OUTPATIENT)
Dept: ULTRASOUND IMAGING | Facility: HOSPITAL | Age: 44
End: 2019-08-27

## 2019-08-27 ENCOUNTER — HOSPITAL ENCOUNTER (EMERGENCY)
Facility: HOSPITAL | Age: 44
Discharge: HOME | End: 2019-08-27
Attending: EMERGENCY MEDICINE | Admitting: EMERGENCY MEDICINE

## 2019-08-27 VITALS
HEART RATE: 83 BPM | HEIGHT: 68 IN | TEMPERATURE: 97.7 F | SYSTOLIC BLOOD PRESSURE: 123 MMHG | OXYGEN SATURATION: 95 % | DIASTOLIC BLOOD PRESSURE: 92 MMHG | BODY MASS INDEX: 31.58 KG/M2 | RESPIRATION RATE: 16 BRPM | WEIGHT: 208.4 LBS

## 2019-08-27 DIAGNOSIS — G89.18 POST-OPERATIVE PAIN: ICD-10-CM

## 2019-08-27 DIAGNOSIS — N83.202 CYST OF LEFT OVARY: Primary | ICD-10-CM

## 2019-08-27 LAB
ALBUMIN SERPL-MCNC: 4.5 G/DL (ref 3.5–5.2)
ALBUMIN/GLOB SERPL: 1.4 G/DL
ALP SERPL-CCNC: 73 U/L (ref 39–117)
ALT SERPL W P-5'-P-CCNC: 18 U/L (ref 1–33)
ANION GAP SERPL CALCULATED.3IONS-SCNC: 12.8 MMOL/L (ref 5–15)
AST SERPL-CCNC: 16 U/L (ref 1–32)
B-HCG UR QL: NEGATIVE
BACTERIA UR QL AUTO: ABNORMAL /HPF
BASOPHILS # BLD AUTO: 0.08 10*3/MM3 (ref 0–0.2)
BASOPHILS NFR BLD AUTO: 1.3 % (ref 0–1.5)
BILIRUB SERPL-MCNC: 0.2 MG/DL (ref 0.2–1.2)
BILIRUB UR QL STRIP: NEGATIVE
BUN BLD-MCNC: 17 MG/DL (ref 6–20)
BUN/CREAT SERPL: 17 (ref 7–25)
CALCIUM SPEC-SCNC: 9.7 MG/DL (ref 8.6–10.5)
CHLORIDE SERPL-SCNC: 104 MMOL/L (ref 98–107)
CLARITY UR: CLEAR
CO2 SERPL-SCNC: 22.2 MMOL/L (ref 22–29)
COLOR UR: YELLOW
CREAT BLD-MCNC: 1 MG/DL (ref 0.57–1)
DEPRECATED RDW RBC AUTO: 42.3 FL (ref 37–54)
EOSINOPHIL # BLD AUTO: 0.2 10*3/MM3 (ref 0–0.4)
EOSINOPHIL NFR BLD AUTO: 3.2 % (ref 0.3–6.2)
ERYTHROCYTE [DISTWIDTH] IN BLOOD BY AUTOMATED COUNT: 12.2 % (ref 12.3–15.4)
GFR SERPL CREATININE-BSD FRML MDRD: 60 ML/MIN/1.73
GLOBULIN UR ELPH-MCNC: 3.2 GM/DL
GLUCOSE BLD-MCNC: 114 MG/DL (ref 65–99)
GLUCOSE UR STRIP-MCNC: NEGATIVE MG/DL
HCT VFR BLD AUTO: 44 % (ref 34–46.6)
HGB BLD-MCNC: 14.5 G/DL (ref 12–15.9)
HGB UR QL STRIP.AUTO: ABNORMAL
HYALINE CASTS UR QL AUTO: ABNORMAL /LPF
IMM GRANULOCYTES # BLD AUTO: 0.02 10*3/MM3 (ref 0–0.05)
IMM GRANULOCYTES NFR BLD AUTO: 0.3 % (ref 0–0.5)
KETONES UR QL STRIP: NEGATIVE
LEUKOCYTE ESTERASE UR QL STRIP.AUTO: NEGATIVE
LYMPHOCYTES # BLD AUTO: 1.68 10*3/MM3 (ref 0.7–3.1)
LYMPHOCYTES NFR BLD AUTO: 26.6 % (ref 19.6–45.3)
MCH RBC QN AUTO: 30.7 PG (ref 26.6–33)
MCHC RBC AUTO-ENTMCNC: 33 G/DL (ref 31.5–35.7)
MCV RBC AUTO: 93 FL (ref 79–97)
MONOCYTES # BLD AUTO: 0.64 10*3/MM3 (ref 0.1–0.9)
MONOCYTES NFR BLD AUTO: 10.1 % (ref 5–12)
NEUTROPHILS # BLD AUTO: 3.7 10*3/MM3 (ref 1.7–7)
NEUTROPHILS NFR BLD AUTO: 58.5 % (ref 42.7–76)
NITRITE UR QL STRIP: NEGATIVE
NRBC BLD AUTO-RTO: 0 /100 WBC (ref 0–0.2)
PH UR STRIP.AUTO: <=5 [PH] (ref 5–8)
PLATELET # BLD AUTO: 253 10*3/MM3 (ref 140–450)
PMV BLD AUTO: 10.3 FL (ref 6–12)
POTASSIUM BLD-SCNC: 4.4 MMOL/L (ref 3.5–5.2)
PROT SERPL-MCNC: 7.7 G/DL (ref 6–8.5)
PROT UR QL STRIP: NEGATIVE
RBC # BLD AUTO: 4.73 10*6/MM3 (ref 3.77–5.28)
RBC # UR: ABNORMAL /HPF
REF LAB TEST METHOD: ABNORMAL
SODIUM BLD-SCNC: 139 MMOL/L (ref 136–145)
SP GR UR STRIP: 1.02 (ref 1–1.03)
SQUAMOUS #/AREA URNS HPF: ABNORMAL /HPF
UROBILINOGEN UR QL STRIP: ABNORMAL
WBC NRBC COR # BLD: 6.32 10*3/MM3 (ref 3.4–10.8)
WBC UR QL AUTO: ABNORMAL /HPF

## 2019-08-27 PROCEDURE — 85025 COMPLETE CBC W/AUTO DIFF WBC: CPT | Performed by: PHYSICIAN ASSISTANT

## 2019-08-27 PROCEDURE — 99284 EMERGENCY DEPT VISIT MOD MDM: CPT

## 2019-08-27 PROCEDURE — 25010000002 MORPHINE PER 10 MG: Performed by: EMERGENCY MEDICINE

## 2019-08-27 PROCEDURE — 81025 URINE PREGNANCY TEST: CPT | Performed by: PHYSICIAN ASSISTANT

## 2019-08-27 PROCEDURE — 81001 URINALYSIS AUTO W/SCOPE: CPT | Performed by: PHYSICIAN ASSISTANT

## 2019-08-27 PROCEDURE — 96376 TX/PRO/DX INJ SAME DRUG ADON: CPT

## 2019-08-27 PROCEDURE — 25010000002 ONDANSETRON PER 1 MG: Performed by: EMERGENCY MEDICINE

## 2019-08-27 PROCEDURE — 25010000002 LORAZEPAM PER 2 MG: Performed by: EMERGENCY MEDICINE

## 2019-08-27 PROCEDURE — 76830 TRANSVAGINAL US NON-OB: CPT

## 2019-08-27 PROCEDURE — 80053 COMPREHEN METABOLIC PANEL: CPT | Performed by: PHYSICIAN ASSISTANT

## 2019-08-27 PROCEDURE — 96375 TX/PRO/DX INJ NEW DRUG ADDON: CPT

## 2019-08-27 PROCEDURE — 25010000002 KETOROLAC TROMETHAMINE PER 15 MG: Performed by: EMERGENCY MEDICINE

## 2019-08-27 PROCEDURE — 25010000002 KETOROLAC TROMETHAMINE PER 15 MG: Performed by: PHYSICIAN ASSISTANT

## 2019-08-27 PROCEDURE — 96374 THER/PROPH/DIAG INJ IV PUSH: CPT

## 2019-08-27 RX ORDER — MORPHINE SULFATE 2 MG/ML
2 INJECTION, SOLUTION INTRAMUSCULAR; INTRAVENOUS ONCE
Status: COMPLETED | OUTPATIENT
Start: 2019-08-27 | End: 2019-08-27

## 2019-08-27 RX ORDER — ONDANSETRON 2 MG/ML
4 INJECTION INTRAMUSCULAR; INTRAVENOUS ONCE
Status: COMPLETED | OUTPATIENT
Start: 2019-08-27 | End: 2019-08-27

## 2019-08-27 RX ORDER — SODIUM CHLORIDE 0.9 % (FLUSH) 0.9 %
10 SYRINGE (ML) INJECTION AS NEEDED
Status: DISCONTINUED | OUTPATIENT
Start: 2019-08-27 | End: 2019-08-27 | Stop reason: HOSPADM

## 2019-08-27 RX ORDER — HYDROCODONE BITARTRATE AND ACETAMINOPHEN 5; 325 MG/1; MG/1
1 TABLET ORAL EVERY 6 HOURS PRN
Qty: 9 TABLET | Refills: 0 | Status: SHIPPED | OUTPATIENT
Start: 2019-08-27 | End: 2019-08-30

## 2019-08-27 RX ORDER — ONDANSETRON 4 MG/1
4 TABLET, ORALLY DISINTEGRATING ORAL EVERY 8 HOURS PRN
Qty: 8 TABLET | Refills: 0 | Status: SHIPPED | OUTPATIENT
Start: 2019-08-27 | End: 2020-06-11

## 2019-08-27 RX ORDER — LORAZEPAM 2 MG/ML
0.25 INJECTION INTRAMUSCULAR ONCE
Status: COMPLETED | OUTPATIENT
Start: 2019-08-27 | End: 2019-08-27

## 2019-08-27 RX ORDER — KETOROLAC TROMETHAMINE 30 MG/ML
10 INJECTION, SOLUTION INTRAMUSCULAR; INTRAVENOUS ONCE
Status: COMPLETED | OUTPATIENT
Start: 2019-08-27 | End: 2019-08-27

## 2019-08-27 RX ADMIN — ONDANSETRON 4 MG: 2 INJECTION INTRAMUSCULAR; INTRAVENOUS at 11:16

## 2019-08-27 RX ADMIN — KETOROLAC TROMETHAMINE 10 MG: 30 INJECTION, SOLUTION INTRAMUSCULAR at 08:50

## 2019-08-27 RX ADMIN — KETOROLAC TROMETHAMINE 10 MG: 30 INJECTION, SOLUTION INTRAMUSCULAR at 11:15

## 2019-08-27 RX ADMIN — LORAZEPAM 0.25 MG: 2 INJECTION INTRAMUSCULAR; INTRAVENOUS at 08:51

## 2019-08-27 RX ADMIN — SODIUM CHLORIDE 1000 ML: 9 INJECTION, SOLUTION INTRAVENOUS at 08:50

## 2019-08-27 RX ADMIN — MORPHINE SULFATE 2 MG: 2 INJECTION, SOLUTION INTRAMUSCULAR; INTRAVENOUS at 11:17

## 2019-08-30 ENCOUNTER — APPOINTMENT (OUTPATIENT)
Dept: PREADMISSION TESTING | Facility: HOSPITAL | Age: 44
End: 2019-08-30

## 2019-08-30 VITALS — WEIGHT: 207.23 LBS | HEIGHT: 68 IN | BODY MASS INDEX: 31.41 KG/M2

## 2019-09-03 ENCOUNTER — PREP FOR SURGERY (OUTPATIENT)
Dept: OTHER | Facility: HOSPITAL | Age: 44
End: 2019-09-03

## 2019-09-03 DIAGNOSIS — N92.0 MENORRHAGIA WITH REGULAR CYCLE: Primary | ICD-10-CM

## 2019-09-03 RX ORDER — ACETAMINOPHEN 500 MG
1000 TABLET ORAL ONCE
Status: CANCELLED | OUTPATIENT
Start: 2019-09-03

## 2019-09-03 RX ORDER — CEFAZOLIN SODIUM 2 G/100ML
2 INJECTION, SOLUTION INTRAVENOUS
Status: CANCELLED | OUTPATIENT
Start: 2019-09-03

## 2019-09-03 RX ORDER — GABAPENTIN 300 MG/1
600 CAPSULE ORAL ONCE
Status: CANCELLED | OUTPATIENT
Start: 2019-09-03 | End: 2019-09-03

## 2019-09-03 RX ORDER — ACETAMINOPHEN 500 MG
1000 TABLET ORAL ONCE
Status: COMPLETED | OUTPATIENT
Start: 2019-09-03 | End: 2019-09-05

## 2019-09-03 RX ORDER — SODIUM CHLORIDE 0.9 % (FLUSH) 0.9 %
10 SYRINGE (ML) INJECTION AS NEEDED
Status: CANCELLED | OUTPATIENT
Start: 2019-09-03

## 2019-09-03 RX ORDER — HEPARIN SODIUM 5000 [USP'U]/ML
5000 INJECTION, SOLUTION INTRAVENOUS; SUBCUTANEOUS EVERY 8 HOURS SCHEDULED
Status: CANCELLED | OUTPATIENT
Start: 2019-09-03

## 2019-09-03 RX ORDER — SODIUM CHLORIDE 0.9 % (FLUSH) 0.9 %
3 SYRINGE (ML) INJECTION EVERY 12 HOURS SCHEDULED
Status: CANCELLED | OUTPATIENT
Start: 2019-09-03

## 2019-09-05 ENCOUNTER — ANESTHESIA EVENT (OUTPATIENT)
Dept: PERIOP | Facility: HOSPITAL | Age: 44
End: 2019-09-05

## 2019-09-05 ENCOUNTER — ANESTHESIA (OUTPATIENT)
Dept: PERIOP | Facility: HOSPITAL | Age: 44
End: 2019-09-05

## 2019-09-05 ENCOUNTER — HOSPITAL ENCOUNTER (OUTPATIENT)
Facility: HOSPITAL | Age: 44
Discharge: HOME | End: 2019-09-05
Attending: OBSTETRICS & GYNECOLOGY | Admitting: OBSTETRICS & GYNECOLOGY

## 2019-09-05 VITALS
WEIGHT: 207 LBS | BODY MASS INDEX: 31.37 KG/M2 | HEIGHT: 68 IN | TEMPERATURE: 98.8 F | RESPIRATION RATE: 18 BRPM | HEART RATE: 94 BPM | SYSTOLIC BLOOD PRESSURE: 134 MMHG | OXYGEN SATURATION: 98 % | DIASTOLIC BLOOD PRESSURE: 90 MMHG

## 2019-09-05 DIAGNOSIS — N92.0 MENORRHAGIA WITH REGULAR CYCLE: ICD-10-CM

## 2019-09-05 DIAGNOSIS — N80.9 ENDOMETRIOSIS: ICD-10-CM

## 2019-09-05 DIAGNOSIS — L91.8 SKIN TAG: ICD-10-CM

## 2019-09-05 PROBLEM — N90.89 VULVAR LESION: Status: ACTIVE | Noted: 2019-09-05

## 2019-09-05 PROBLEM — E66.811 CLASS 1 OBESITY WITH BODY MASS INDEX (BMI) OF 31.0 TO 31.9 IN ADULT: Status: ACTIVE | Noted: 2019-09-05

## 2019-09-05 PROBLEM — R10.2 PELVIC PAIN: Status: ACTIVE | Noted: 2019-09-05

## 2019-09-05 PROBLEM — N92.1 MENORRHAGIA WITH IRREGULAR CYCLE: Status: ACTIVE | Noted: 2019-09-05

## 2019-09-05 PROBLEM — E66.9 CLASS 1 OBESITY WITH BODY MASS INDEX (BMI) OF 31.0 TO 31.9 IN ADULT: Status: ACTIVE | Noted: 2019-09-05

## 2019-09-05 PROBLEM — N92.1 MENORRHAGIA WITH IRREGULAR CYCLE: Status: RESOLVED | Noted: 2019-09-05 | Resolved: 2019-09-05

## 2019-09-05 LAB
B-HCG UR QL: NEGATIVE
INTERNAL NEGATIVE CONTROL: NEGATIVE
INTERNAL POSITIVE CONTROL: POSITIVE
Lab: NORMAL

## 2019-09-05 PROCEDURE — 81025 URINE PREGNANCY TEST: CPT | Performed by: OBSTETRICS & GYNECOLOGY

## 2019-09-05 PROCEDURE — 25010000002 HEPARIN (PORCINE) PER 1000 UNITS: Performed by: OBSTETRICS & GYNECOLOGY

## 2019-09-05 PROCEDURE — 25010000002 PROPOFOL 1000 MG/ML EMULSION: Performed by: NURSE ANESTHETIST, CERTIFIED REGISTERED

## 2019-09-05 PROCEDURE — 25010000002 HYDROMORPHONE PER 4 MG: Performed by: NURSE ANESTHETIST, CERTIFIED REGISTERED

## 2019-09-05 PROCEDURE — 88307 TISSUE EXAM BY PATHOLOGIST: CPT | Performed by: OBSTETRICS & GYNECOLOGY

## 2019-09-05 PROCEDURE — 25010000002 MIDAZOLAM PER 1 MG: Performed by: ANESTHESIOLOGY

## 2019-09-05 PROCEDURE — 25010000002 NEOSTIGMINE 10 MG/10ML SOLUTION: Performed by: NURSE ANESTHETIST, CERTIFIED REGISTERED

## 2019-09-05 PROCEDURE — 25010000002 PROPOFOL 10 MG/ML EMULSION: Performed by: NURSE ANESTHETIST, CERTIFIED REGISTERED

## 2019-09-05 PROCEDURE — 25010000003 CEFAZOLIN IN DEXTROSE 2-4 GM/100ML-% SOLUTION: Performed by: OBSTETRICS & GYNECOLOGY

## 2019-09-05 PROCEDURE — 25010000002 HYDROMORPHONE PER 4 MG: Performed by: ANESTHESIOLOGY

## 2019-09-05 PROCEDURE — 25010000002 DEXAMETHASONE PER 1 MG: Performed by: NURSE ANESTHETIST, CERTIFIED REGISTERED

## 2019-09-05 PROCEDURE — 25010000002 FENTANYL CITRATE (PF) 100 MCG/2ML SOLUTION: Performed by: NURSE ANESTHETIST, CERTIFIED REGISTERED

## 2019-09-05 PROCEDURE — 25010000002 KETOROLAC TROMETHAMINE PER 15 MG: Performed by: NURSE ANESTHETIST, CERTIFIED REGISTERED

## 2019-09-05 PROCEDURE — 88304 TISSUE EXAM BY PATHOLOGIST: CPT | Performed by: OBSTETRICS & GYNECOLOGY

## 2019-09-05 RX ORDER — GABAPENTIN 300 MG/1
600 CAPSULE ORAL ONCE
Status: COMPLETED | OUTPATIENT
Start: 2019-09-05 | End: 2019-09-05

## 2019-09-05 RX ORDER — HYDROMORPHONE HYDROCHLORIDE 1 MG/ML
0.5 INJECTION, SOLUTION INTRAMUSCULAR; INTRAVENOUS; SUBCUTANEOUS
Status: COMPLETED | OUTPATIENT
Start: 2019-09-05 | End: 2019-09-05

## 2019-09-05 RX ORDER — GLYCOPYRROLATE 0.2 MG/ML
INJECTION INTRAMUSCULAR; INTRAVENOUS AS NEEDED
Status: DISCONTINUED | OUTPATIENT
Start: 2019-09-05 | End: 2019-09-05 | Stop reason: SURG

## 2019-09-05 RX ORDER — DEXAMETHASONE SODIUM PHOSPHATE 4 MG/ML
INJECTION, SOLUTION INTRA-ARTICULAR; INTRALESIONAL; INTRAMUSCULAR; INTRAVENOUS; SOFT TISSUE AS NEEDED
Status: DISCONTINUED | OUTPATIENT
Start: 2019-09-05 | End: 2019-09-05 | Stop reason: SURG

## 2019-09-05 RX ORDER — FAMOTIDINE 20 MG/1
20 TABLET, FILM COATED ORAL
Status: DISCONTINUED | OUTPATIENT
Start: 2019-09-05 | End: 2019-09-05

## 2019-09-05 RX ORDER — SODIUM CHLORIDE 0.9 % (FLUSH) 0.9 %
10 SYRINGE (ML) INJECTION AS NEEDED
Status: DISCONTINUED | OUTPATIENT
Start: 2019-09-05 | End: 2019-09-05 | Stop reason: HOSPADM

## 2019-09-05 RX ORDER — FENTANYL CITRATE 50 UG/ML
50 INJECTION, SOLUTION INTRAMUSCULAR; INTRAVENOUS
Status: DISCONTINUED | OUTPATIENT
Start: 2019-09-05 | End: 2019-09-05 | Stop reason: HOSPADM

## 2019-09-05 RX ORDER — MIDAZOLAM HYDROCHLORIDE 1 MG/ML
1 INJECTION INTRAMUSCULAR; INTRAVENOUS
Status: DISCONTINUED | OUTPATIENT
Start: 2019-09-05 | End: 2019-09-05 | Stop reason: HOSPADM

## 2019-09-05 RX ORDER — LIDOCAINE HYDROCHLORIDE 10 MG/ML
INJECTION, SOLUTION EPIDURAL; INFILTRATION; INTRACAUDAL; PERINEURAL AS NEEDED
Status: DISCONTINUED | OUTPATIENT
Start: 2019-09-05 | End: 2019-09-05 | Stop reason: SURG

## 2019-09-05 RX ORDER — ONDANSETRON 2 MG/ML
4 INJECTION INTRAMUSCULAR; INTRAVENOUS ONCE AS NEEDED
Status: DISCONTINUED | OUTPATIENT
Start: 2019-09-05 | End: 2019-09-05 | Stop reason: HOSPADM

## 2019-09-05 RX ORDER — ACETAMINOPHEN 325 MG/1
650 TABLET ORAL EVERY 4 HOURS PRN
Qty: 100 TABLET | Refills: 2 | Status: SHIPPED | OUTPATIENT
Start: 2019-09-05 | End: 2019-10-16

## 2019-09-05 RX ORDER — MELOXICAM 7.5 MG/1
7.5 TABLET ORAL DAILY
Qty: 30 TABLET | Refills: 0 | Status: SHIPPED | OUTPATIENT
Start: 2019-09-05 | End: 2019-10-16

## 2019-09-05 RX ORDER — SODIUM CHLORIDE 0.9 % (FLUSH) 0.9 %
3-10 SYRINGE (ML) INJECTION AS NEEDED
Status: DISCONTINUED | OUTPATIENT
Start: 2019-09-05 | End: 2019-09-05

## 2019-09-05 RX ORDER — SODIUM CHLORIDE, SODIUM LACTATE, POTASSIUM CHLORIDE, CALCIUM CHLORIDE 600; 310; 30; 20 MG/100ML; MG/100ML; MG/100ML; MG/100ML
9 INJECTION, SOLUTION INTRAVENOUS CONTINUOUS PRN
Status: DISCONTINUED | OUTPATIENT
Start: 2019-09-05 | End: 2019-09-05

## 2019-09-05 RX ORDER — POLYETHYLENE GLYCOL 3350 17 G/17G
17 POWDER, FOR SOLUTION ORAL DAILY
Qty: 238 G | Refills: 0 | Status: SHIPPED | OUTPATIENT
Start: 2019-09-05 | End: 2019-10-16

## 2019-09-05 RX ORDER — MAGNESIUM HYDROXIDE 1200 MG/15ML
LIQUID ORAL AS NEEDED
Status: DISCONTINUED | OUTPATIENT
Start: 2019-09-05 | End: 2019-09-05 | Stop reason: HOSPADM

## 2019-09-05 RX ORDER — OXYCODONE HYDROCHLORIDE 5 MG/1
5 TABLET ORAL EVERY 4 HOURS PRN
Qty: 15 TABLET | Refills: 0 | Status: SHIPPED | OUTPATIENT
Start: 2019-09-05 | End: 2019-10-16

## 2019-09-05 RX ORDER — HEPARIN SODIUM 5000 [USP'U]/ML
INJECTION, SOLUTION INTRAVENOUS; SUBCUTANEOUS AS NEEDED
Status: DISCONTINUED | OUTPATIENT
Start: 2019-09-05 | End: 2019-09-05 | Stop reason: HOSPADM

## 2019-09-05 RX ORDER — HYDROMORPHONE HYDROCHLORIDE 1 MG/ML
0.5 INJECTION, SOLUTION INTRAMUSCULAR; INTRAVENOUS; SUBCUTANEOUS
Status: DISCONTINUED | OUTPATIENT
Start: 2019-09-05 | End: 2019-09-05 | Stop reason: HOSPADM

## 2019-09-05 RX ORDER — LIDOCAINE HYDROCHLORIDE 10 MG/ML
0.5 INJECTION, SOLUTION EPIDURAL; INFILTRATION; INTRACAUDAL; PERINEURAL ONCE AS NEEDED
Status: DISCONTINUED | OUTPATIENT
Start: 2019-09-05 | End: 2019-09-05

## 2019-09-05 RX ORDER — SODIUM CHLORIDE 9 MG/ML
INJECTION, SOLUTION INTRAVENOUS AS NEEDED
Status: DISCONTINUED | OUTPATIENT
Start: 2019-09-05 | End: 2019-09-05 | Stop reason: HOSPADM

## 2019-09-05 RX ORDER — SODIUM CHLORIDE, SODIUM LACTATE, POTASSIUM CHLORIDE, AND CALCIUM CHLORIDE .6; .31; .03; .02 G/100ML; G/100ML; G/100ML; G/100ML
9 INJECTION, SOLUTION INTRAVENOUS CONTINUOUS
Status: DISCONTINUED | OUTPATIENT
Start: 2019-09-05 | End: 2019-09-05 | Stop reason: HOSPADM

## 2019-09-05 RX ORDER — CEFAZOLIN SODIUM 2 G/100ML
2 INJECTION, SOLUTION INTRAVENOUS
Status: COMPLETED | OUTPATIENT
Start: 2019-09-05 | End: 2019-09-05

## 2019-09-05 RX ORDER — BUPIVACAINE HYDROCHLORIDE AND EPINEPHRINE 5; 5 MG/ML; UG/ML
INJECTION, SOLUTION PERINEURAL AS NEEDED
Status: DISCONTINUED | OUTPATIENT
Start: 2019-09-05 | End: 2019-09-05 | Stop reason: HOSPADM

## 2019-09-05 RX ORDER — ROCURONIUM BROMIDE 10 MG/ML
INJECTION, SOLUTION INTRAVENOUS AS NEEDED
Status: DISCONTINUED | OUTPATIENT
Start: 2019-09-05 | End: 2019-09-05 | Stop reason: SURG

## 2019-09-05 RX ORDER — SODIUM CHLORIDE 0.9 % (FLUSH) 0.9 %
3 SYRINGE (ML) INJECTION EVERY 12 HOURS SCHEDULED
Status: DISCONTINUED | OUTPATIENT
Start: 2019-09-05 | End: 2019-09-05 | Stop reason: HOSPADM

## 2019-09-05 RX ORDER — NEOSTIGMINE METHYLSULFATE 1 MG/ML
INJECTION, SOLUTION INTRAVENOUS AS NEEDED
Status: DISCONTINUED | OUTPATIENT
Start: 2019-09-05 | End: 2019-09-05 | Stop reason: SURG

## 2019-09-05 RX ORDER — HYDROMORPHONE HYDROCHLORIDE 1 MG/ML
0.5 INJECTION, SOLUTION INTRAMUSCULAR; INTRAVENOUS; SUBCUTANEOUS
Status: DISCONTINUED | OUTPATIENT
Start: 2019-09-05 | End: 2019-09-05

## 2019-09-05 RX ORDER — SODIUM CHLORIDE 0.9 % (FLUSH) 0.9 %
3 SYRINGE (ML) INJECTION EVERY 12 HOURS SCHEDULED
Status: DISCONTINUED | OUTPATIENT
Start: 2019-09-05 | End: 2019-09-05

## 2019-09-05 RX ORDER — PROPOFOL 10 MG/ML
VIAL (ML) INTRAVENOUS AS NEEDED
Status: DISCONTINUED | OUTPATIENT
Start: 2019-09-05 | End: 2019-09-05 | Stop reason: SURG

## 2019-09-05 RX ORDER — FAMOTIDINE 20 MG/1
20 TABLET, FILM COATED ORAL ONCE
Status: COMPLETED | OUTPATIENT
Start: 2019-09-05 | End: 2019-09-05

## 2019-09-05 RX ORDER — FENTANYL CITRATE 50 UG/ML
INJECTION, SOLUTION INTRAMUSCULAR; INTRAVENOUS AS NEEDED
Status: DISCONTINUED | OUTPATIENT
Start: 2019-09-05 | End: 2019-09-05 | Stop reason: SURG

## 2019-09-05 RX ORDER — LIDOCAINE HYDROCHLORIDE 10 MG/ML
0.5 INJECTION, SOLUTION EPIDURAL; INFILTRATION; INTRACAUDAL; PERINEURAL ONCE
Status: COMPLETED | OUTPATIENT
Start: 2019-09-05 | End: 2019-09-05

## 2019-09-05 RX ORDER — KETOROLAC TROMETHAMINE 30 MG/ML
INJECTION, SOLUTION INTRAMUSCULAR; INTRAVENOUS AS NEEDED
Status: DISCONTINUED | OUTPATIENT
Start: 2019-09-05 | End: 2019-09-05 | Stop reason: SURG

## 2019-09-05 RX ORDER — HEPARIN SODIUM 5000 [USP'U]/ML
5000 INJECTION, SOLUTION INTRAVENOUS; SUBCUTANEOUS EVERY 8 HOURS SCHEDULED
Status: DISCONTINUED | OUTPATIENT
Start: 2019-09-05 | End: 2019-09-05 | Stop reason: HOSPADM

## 2019-09-05 RX ADMIN — CEFAZOLIN SODIUM 2 G: 2 INJECTION, SOLUTION INTRAVENOUS at 09:04

## 2019-09-05 RX ADMIN — NEOSTIGMINE METHYLSULFATE 4.5 MG: 1 INJECTION, SOLUTION INTRAVENOUS at 10:20

## 2019-09-05 RX ADMIN — HYDROMORPHONE HYDROCHLORIDE 0.5 MG: 1 INJECTION, SOLUTION INTRAMUSCULAR; INTRAVENOUS; SUBCUTANEOUS at 11:07

## 2019-09-05 RX ADMIN — HYDROMORPHONE HYDROCHLORIDE 0.5 MG: 1 INJECTION, SOLUTION INTRAMUSCULAR; INTRAVENOUS; SUBCUTANEOUS at 11:01

## 2019-09-05 RX ADMIN — ACETAMINOPHEN 1000 MG: 500 TABLET ORAL at 07:43

## 2019-09-05 RX ADMIN — GLYCOPYRROLATE 0.4 MG: 0.2 INJECTION, SOLUTION INTRAMUSCULAR; INTRAVENOUS at 10:20

## 2019-09-05 RX ADMIN — GLYCOPYRROLATE 0.2 MG: 0.2 INJECTION, SOLUTION INTRAMUSCULAR; INTRAVENOUS at 10:05

## 2019-09-05 RX ADMIN — PROPOFOL 200 MG: 10 INJECTION, EMULSION INTRAVENOUS at 09:10

## 2019-09-05 RX ADMIN — FENTANYL CITRATE 50 MCG: 50 INJECTION, SOLUTION INTRAMUSCULAR; INTRAVENOUS at 09:10

## 2019-09-05 RX ADMIN — FAMOTIDINE 20 MG: 20 TABLET ORAL at 07:45

## 2019-09-05 RX ADMIN — HYDROMORPHONE HYDROCHLORIDE 0.5 MG: 1 INJECTION, SOLUTION INTRAMUSCULAR; INTRAVENOUS; SUBCUTANEOUS at 12:24

## 2019-09-05 RX ADMIN — HYDROMORPHONE HYDROCHLORIDE 0.5 MG: 1 INJECTION, SOLUTION INTRAMUSCULAR; INTRAVENOUS; SUBCUTANEOUS at 10:52

## 2019-09-05 RX ADMIN — SODIUM CHLORIDE, POTASSIUM CHLORIDE, SODIUM LACTATE AND CALCIUM CHLORIDE: 600; 310; 30; 20 INJECTION, SOLUTION INTRAVENOUS at 09:04

## 2019-09-05 RX ADMIN — FENTANYL CITRATE 50 MCG: 50 INJECTION, SOLUTION INTRAMUSCULAR; INTRAVENOUS at 09:33

## 2019-09-05 RX ADMIN — LIDOCAINE HYDROCHLORIDE 0.3 ML: 10 INJECTION, SOLUTION EPIDURAL; INFILTRATION; INTRACAUDAL; PERINEURAL at 07:45

## 2019-09-05 RX ADMIN — DEXAMETHASONE SODIUM PHOSPHATE 4 MG: 4 INJECTION, SOLUTION INTRAMUSCULAR; INTRAVENOUS at 09:10

## 2019-09-05 RX ADMIN — MIDAZOLAM HYDROCHLORIDE 1 MG: 1 INJECTION, SOLUTION INTRAMUSCULAR; INTRAVENOUS at 08:35

## 2019-09-05 RX ADMIN — PROPOFOL 25 MCG/KG/MIN: 10 INJECTION, EMULSION INTRAVENOUS at 09:10

## 2019-09-05 RX ADMIN — GABAPENTIN 600 MG: 300 CAPSULE ORAL at 07:43

## 2019-09-05 RX ADMIN — ROCURONIUM BROMIDE 50 MG: 10 INJECTION INTRAVENOUS at 09:10

## 2019-09-05 RX ADMIN — SODIUM CHLORIDE, POTASSIUM CHLORIDE, SODIUM LACTATE AND CALCIUM CHLORIDE 9 ML/HR: 600; 310; 30; 20 INJECTION, SOLUTION INTRAVENOUS at 07:46

## 2019-09-05 RX ADMIN — MIDAZOLAM HYDROCHLORIDE 1 MG: 1 INJECTION, SOLUTION INTRAMUSCULAR; INTRAVENOUS at 08:40

## 2019-09-05 RX ADMIN — KETOROLAC TROMETHAMINE 30 MG: 30 INJECTION, SOLUTION INTRAMUSCULAR at 10:20

## 2019-09-05 RX ADMIN — HYDROMORPHONE HYDROCHLORIDE 0.5 MG: 1 INJECTION, SOLUTION INTRAMUSCULAR; INTRAVENOUS; SUBCUTANEOUS at 10:42

## 2019-09-05 RX ADMIN — LIDOCAINE HYDROCHLORIDE 50 MG: 10 INJECTION, SOLUTION EPIDURAL; INFILTRATION; INTRACAUDAL; PERINEURAL at 09:10

## 2019-09-05 NOTE — INTERVAL H&P NOTE
Pre-Op H&P (See Recent Office Note Attached for Full H&P)    Review of Systems:  General ROS:  no fever, chills, rashes, No change since last office visit  Cardiovascular ROS: no chest pain or dyspnea on exertion  2018 TTE:    · Patient has a history of breast cancer and treatment with Adriamycin.  · Left ventricular systolic function is low normal. Estimated EF = 45%. There is global hypokinesis.  · The valves are functionally normal.  · Compared to an echocardiogram performed 1/8/18, there is been slight reduction in LV systolic function.  Respiratory ROS: no cough, shortness of breath, or wheezing    Meds:    No current facility-administered medications on file prior to encounter.      Current Outpatient Medications on File Prior to Encounter   Medication Sig Dispense Refill   • ALPRAZolam (XANAX) 1 MG tablet TK 1 T PO TID prn  2   • amitriptyline (ELAVIL) 25 MG tablet TK 1 T PO HS  5   • Cholecalciferol (VITAMIN D3) 5000 units capsule capsule Take 5,000 Units by mouth Daily.     • Cyanocobalamin (VITAMIN B-12) 1000 MCG/15ML liquid Take 5,000 mcg by mouth As Needed.     • esomeprazole (nexIUM) 20 MG capsule Take 20 mg by mouth Every Morning Before Breakfast.     • Magnesium 200 MG tablet Take 1 tablet by mouth Daily.     • metoprolol succinate XL (TOPROL-XL) 25 MG 24 hr tablet Take 1 tablet by mouth Daily. (Patient taking differently: Take 50 mg by mouth Daily.) 90 tablet 3   • Multiple Vitamins-Minerals (MULTIVITAMIN ADULT PO) Take 1 tablet by mouth Daily.     • ondansetron ODT (ZOFRAN-ODT) 4 MG disintegrating tablet Take 1 tablet by mouth Every 8 (Eight) Hours As Needed for Nausea or Vomiting. 8 tablet 0   • polyethyl glycol-propyl glycol (SYSTANE) 0.4-0.3 % solution ophthalmic solution Administer 1 drop to both eyes As Needed (for itchy eyes).         Vital Signs:  Afebrile HR 86 O2 99% /79  Physical Exam:    CV:  S1S2 regular rate and rhythm, no murmur               Resp:  Clear to auscultation;  respirations regular, even and unlabored    Results Review:     Lab Results   Component Value Date    WBC 6.32 08/27/2019    HGB 14.5 08/27/2019    HCT 44.0 08/27/2019    MCV 93.0 08/27/2019     08/27/2019    NEUTROABS 3.70 08/27/2019    GLUCOSE 114 (H) 08/27/2019    BUN 17 08/27/2019    CREATININE 1.00 08/27/2019    EGFRIFNONA 60 (L) 08/27/2019     08/27/2019    K 4.4 08/27/2019     08/27/2019    CO2 22.2 08/27/2019    CALCIUM 9.7 08/27/2019    ALBUMIN 4.50 08/27/2019    AST 16 08/27/2019    ALT 18 08/27/2019    BILITOT 0.2 08/27/2019        I reviewed the patient's new clinical results.    Cancer Staging (if applicable)  Cancer Patient: __ yes _x_no __unknown; If yes, clinical stage T:__ N:__M:__, stage group or __N/A    Keesha Pollard, APRN  9/5/2019   7:31 AM

## 2019-09-05 NOTE — OP NOTE
Hysterectomy Procedure Note      Pre-operative Diagnosis: Menorrhagia, pelvic pain, vulvar lesion    Post-operative Diagnosis: Menorrhagia, pelvic pain, vulvar lesion, left ovarian adhesions with inclusion cyst.    Operation: TRH with BSO, excision of vulvar lesion.    Surgeon: Li Faye MD     Assistants: Cordell    Anesthesia: General endotracheal anesthesia    Findings: Adhesions    Estimated Blood Loss: 100    Specimens: Uterus with bilateral tubes and ovaries, skin tag of mons apprx 1.5cm    Complications:  None    Disposition: PACU - hemodynamically stable.      Procedure Details    The patient was seen in the Holding Room. The risks, benefits, complications, treatment options, and expected outcomes were discussed with the patient. The patient concurred with the proposed plan, giving informed consent. The patient was identified as Estefany Bryan and the procedure verified as Robotic hysterectomy with bilateral salpingoopherectomy. A Time Out was held and the above information confirmed.    After induction of anesthesia, the patient was draped and prepped in the usual sterile manner. The uterus was sounded and fit with a Kalyani-Koh intrauterine manipulator. The vulvar skin tag was excised with a #15 blade. Direct pressure was used for hemostasis.     The operators gloves were changed.  The supraumbilical area was injected with a dilute marcaine solution.  An 8mm supraumbilical incision was made. Intraperitoneal access was gained with the optical noncutting trocar under direct visualization. CO2 was used to insufflate the abdominopelvic cavity to a pressure of approximately 15 mm Hg. She was placed in maximum Trendelenburg. One additional 8mm port site was placed on the left and two additional 8mm ports sites were placed on the right.     The DaVinci system was docked and the procedure continued at the console. The anatomy was inspected and adhesions were taken down with monopolar, sharp and  blunt dissection. The left ovary was adherent onto the pelvic side wall. There was a 3 cm inclusion cyst discovered under the ovarian adhesions. The course of the right and left ureters was identified. The right infundibulopelvic ligament with identified, fulgurated and transected. The remainder of the broad ligament was fulguration and transected. The bladder was dissected across the midline with monopolar, sharp and blunt dissection. The left ureter was identified.  The left infundibulopelvic ligament was isolated, fulgurated and transected.  The remainder of the broad ligament, including the round ligament was fulgurated and transected. The uterine vessels on the left and right were isolated, fulgurated and transected. A circumferential incision was made along the cervicovaginal junction and the uterus was removed from the vagina. The vaginal cuff was then closed with 2-0 vicryl and 0 vicryl. There was good hemostasis at low pressure.     The DaVinci system was undocked and the procedure was completed at the bedside. The trocars were removed. The skin incisions were closed with 3-0 plain and reinforced with skin glue. The counts were correct x 2. Patient was awakened and taken to recover in stable condition.       Li Faye MD  09/05/19  10:33 AM

## 2019-09-05 NOTE — ANESTHESIA PREPROCEDURE EVALUATION
Anesthesia Evaluation     Patient summary reviewed and Nursing notes reviewed   NPO Solid Status: > 8 hours  NPO Liquid Status: > 2 hours           Airway   Mallampati: II  TM distance: >3 FB  Neck ROM: full  No difficulty expected  Dental      Pulmonary    (+) a smoker, shortness of breath,   (-) asthma  Cardiovascular     Patient on routine beta blocker    (+) hypertension,   (-) valvular problems/murmurs, past MI, dysrhythmias, angina, cardiac stents    ROS comment: ECHO Patient has a history of breast cancer and treatment with Adriamycin.  Left ventricular systolic function is low normal. Estimated EF = 45%. There is global hypokinesis.  Reduced from 61% before chemo    Neuro/Psych  (+) headaches, dizziness/light headedness, numbness (bells palsy), psychiatric history (xanax elavil),     (-) seizures, CVA  GI/Hepatic/Renal/Endo    (+)  GERD,    (-) liver disease, no renal disease, diabetes, hypothyroidism    Musculoskeletal     Abdominal    Substance History      OB/GYN          Other   (+) arthritis   history of cancer (R breast Ca )        Phys Exam Other: Tavarez 2  grade 1 view 2018                 Anesthesia Plan    ASA 3     general     intravenous induction   Anesthetic plan, all risks, benefits, and alternatives have been provided, discussed and informed consent has been obtained with: patient.    Plan discussed with CRNA.

## 2019-09-05 NOTE — ANESTHESIA PROCEDURE NOTES
Airway  Urgency: elective    Date/Time: 9/5/2019 9:13 AM  Airway not difficult    General Information and Staff    Patient location during procedure: OR  CRNA: Claudy Pollack CRNA    Indications and Patient Condition  Indications for airway management: airway protection    Preoxygenated: yes  MILS not maintained throughout  Mask difficulty assessment: 1 - vent by mask    Final Airway Details  Final airway type: endotracheal airway      Successful airway: ETT  Cuffed: yes   Successful intubation technique: direct laryngoscopy  Facilitating devices/methods: intubating stylet  Endotracheal tube insertion site: oral  Blade: Maurice  Blade size: 3  ETT size (mm): 7.0  Cormack-Lehane Classification: grade I - full view of glottis  Placement verified by: chest auscultation and capnometry   Measured from: lips  ETT/EBT  to lips (cm): 20  Number of attempts at approach: 1    Additional Comments  Negative epigastric sounds, Breath sound equal bilaterally with symmetric chest rise and fall. Lips and teeth as preop

## 2019-09-05 NOTE — ANESTHESIA POSTPROCEDURE EVALUATION
Patient: Estefany Elenatox    Procedure Summary     Date:  09/05/19 Room / Location:   [facility] [address] [place] [address] [facility]    Anesthesia Start:  0904 Anesthesia Stop:  1031    Procedure:  TOTAL ROBOTIC HYSTERECTOMY WITH BILATERAL SALPINGO-OOPHORECTOMY. EXCISION IOF VULVAR SKIN TAG (N/A Abdomen) Diagnosis:      Surgeon:  Li Faye MD Provider:  Jori Porter MD    Anesthesia Type:  general ASA Status:  3          Anesthesia Type: general  Last vitals  BP   112/76 (09/05/19 1031)   Temp   98.6 °F (37 °C) (09/05/19 1031)   Pulse   86 (09/05/19 1031)   Resp   14 (09/05/19 1031)     SpO2   98 % (09/05/19 1031)     Post Anesthesia Care and Evaluation    Patient location during evaluation: PACU  Patient participation: waiting for patient participation  Level of consciousness: responsive to physical stimuli  Pain management: adequate  Airway patency: patent  Anesthetic complications: No anesthetic complications  PONV Status: none  Cardiovascular status: hemodynamically stable and acceptable  Respiratory status: nonlabored ventilation, acceptable, nasal cannula and oral airway  Hydration status: acceptable

## 2019-10-15 NOTE — PROGRESS NOTES
PROBLEM LIST:  1. 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  Biopsy showed high grade invasive ductal carcinoma, ER negative, NV negative, HER-2 negative.  The mass on imaging measures approximately 9.7 cm.  Enlarged abnormal lymph nodes are present on imaging.  FNA of 1 axillary lymph node was negative for malignancy.  PET/CT on 1/12/18 showed large necrotic right breast mass, 2 pathologically enlarged and hypermetabolic intramammary nodes, and no evidence of distant disease.  Breast MRI on 1/17/18 showed evidence of pectoral muscle involvement, as well as several satellite lesions.  B) neoadjuvant ddAC followed by weekly taxol started on 1/23/17.  Taxol stopped after 10 doses due to persistent tachycardia  C) bilateral mastectomy on 6/21/18.  Residual 5 mm high grade IDC in the right breast.  0/4 LN involved.  [id number]  D) adjuvant radiation completed 8/27/18.  2.  Hypertension  3.  Anxiety  4.  Bell's palsy  5. Sinus tachycardia    Subjective      CC: breast cancer    HISTORY OF PRESENT ILLNESS:   Estefany Bryan returns today for follow-up.   She underwent a hysterectomy last month. Pathology was all benign.  She says she is recovering pretty well from the surgery.  She would like to have breast reconstruction but wants to wait until after tax season is over since she is very busy at work.  She has had some medication changes recently.  Her Effexor was switched to Cymbalta and her amitriptyline was changed to Topamax.  Since these medicine changes she has had some loose bowel movements but has been able to manage it.  Otherwise she is doing well with no new complaints.    Past Medical History, Past Surgical History, Social History, Family History have been reviewed and are without significant changes except as mentioned.    Review of Systems   A comprehensive 14 point  "review of systems was performed and was negative except as mentioned.    Medications:  The current medication list was reviewed in the EMR    ALLERGIES:    Allergies   Allergen Reactions   • Chlorhexidine Other (See Comments)     Pt. developed redness and burning sensation after using.   • Codeine GI Intolerance     If take with nexium pt is okay  or if she can have zofran with       Objective      /73 Comment: LUE  Pulse 91   Temp 97.1 °F (36.2 °C) (Temporal)   Resp 18   Ht 172.7 cm (68\")   Wt 93.4 kg (206 lb)   SpO2 98% Comment: RA  BMI 31.32 kg/m²      Performance Status: 0    General: well appearing female in no acute distress  Neuro: alert and oriented  HEENT: sclera anicteric, oropharynx clear  Lymphatics: no cervical, supraclavicular adenopathy. No palpable axillary adenopathy  Chest: Status post bilateral mastectomy.  No palpable masses or lesions  Cardiovascular: regular rate and rhythm, no murmurs  Lungs: clear to auscultation bilaterally  Abdomen: soft, nontender, nondistended.  No palpable organomegaly  Extremeties: no lower extremity edema  Skin: no rashes, lesions, bruising, or petechiae  Psych: mood and affect appropriate    Labs:  Results for ESPERANZA WILLAMS (MRN [id number]) as of 10/16/2019 11:24   Ref. Range 10/16/2019 11:11   WBC Latest Ref Range: 3.40 - 10.80 10*3/mm3 6.30   RBC Latest Ref Range: 3.77 - 5.28 10*6/mm3 4.45   Hemoglobin Latest Ref Range: 12.0 - 15.9 g/dL 13.7   Hematocrit Latest Ref Range: 34.0 - 46.6 % 41.8   RDW Latest Ref Range: 12.3 - 15.4 % 13.1   MCV Latest Ref Range: 79.0 - 97.0 fL 94.0   MCH Latest Ref Range: 26.6 - 33.0 pg 30.8   MCHC Latest Ref Range: 31.5 - 35.7 g/dL 32.7   MPV Latest Ref Range: 6.0 - 12.0 fL 8.2   Platelets Latest Ref Range: 140 - 450 10*3/mm3 275   Neutrophil Rel % Latest Ref Range: 42.7 - 76.0 % 63.3   Lymphocyte Rel % Latest Ref Range: 19.6 - 45.3 % 29.3   Monocyte Rel % Latest Ref Range: 5.0 - 12.0 % 7.4   Neutrophils Absolute " Latest Ref Range: 1.70 - 7.00 10*3/mm3 4.00   Lymphocytes Absolute Latest Ref Range: 0.70 - 3.10 10*3/mm3 1.80         Assessment/Plan   Estefany Bryan is a 44 y.o. year old female with [id number] triple negative breast cancer who returns for follow up.   She continues to do well with no evidence of recurrent disease.    We discussed that her diarrhea could be related to medication changes.  I recommended that she give it a few more weeks to see if it improves on its own.  I would also recommend that she stop the magnesium supplement as this could contribute to loose bowel movements.      Follow-up in 4 months with labs.              I spent 15 minutes with the patient. I spent > 50% percent of this time counseling and discussing prognosis, diagnostic testing, evaluation, current status and management.    Winsome Romano MD  [facility] Hematology and Oncology    10/16/2019          CC:

## 2019-10-16 ENCOUNTER — OFFICE VISIT (OUTPATIENT)
Dept: ONCOLOGY | Facility: CLINIC | Age: 44
End: 2019-10-16

## 2019-10-16 ENCOUNTER — LAB (OUTPATIENT)
Dept: LAB | Facility: HOSPITAL | Age: 44
End: 2019-10-16

## 2019-10-16 VITALS
BODY MASS INDEX: 31.22 KG/M2 | OXYGEN SATURATION: 98 % | DIASTOLIC BLOOD PRESSURE: 73 MMHG | TEMPERATURE: 97.1 F | RESPIRATION RATE: 18 BRPM | HEIGHT: 68 IN | HEART RATE: 91 BPM | WEIGHT: 206 LBS | SYSTOLIC BLOOD PRESSURE: 127 MMHG

## 2019-10-16 DIAGNOSIS — Z17.1 MALIGNANT NEOPLASM OF RIGHT BREAST IN FEMALE, ESTROGEN RECEPTOR NEGATIVE, UNSPECIFIED SITE OF BREAST (HCC): Primary | ICD-10-CM

## 2019-10-16 DIAGNOSIS — C50.911 MALIGNANT NEOPLASM OF RIGHT BREAST IN FEMALE, ESTROGEN RECEPTOR NEGATIVE, UNSPECIFIED SITE OF BREAST (HCC): ICD-10-CM

## 2019-10-16 DIAGNOSIS — Z17.1 MALIGNANT NEOPLASM OF RIGHT BREAST IN FEMALE, ESTROGEN RECEPTOR NEGATIVE, UNSPECIFIED SITE OF BREAST (HCC): ICD-10-CM

## 2019-10-16 DIAGNOSIS — C50.911 MALIGNANT NEOPLASM OF RIGHT BREAST IN FEMALE, ESTROGEN RECEPTOR NEGATIVE, UNSPECIFIED SITE OF BREAST (HCC): Primary | ICD-10-CM

## 2019-10-16 LAB
ALBUMIN SERPL-MCNC: 4.7 G/DL (ref 3.5–5.2)
ALBUMIN/GLOB SERPL: 1.6 G/DL
ALP SERPL-CCNC: 76 U/L (ref 39–117)
ALT SERPL W P-5'-P-CCNC: 29 U/L (ref 1–33)
ANION GAP SERPL CALCULATED.3IONS-SCNC: 10 MMOL/L (ref 5–15)
AST SERPL-CCNC: 18 U/L (ref 1–32)
BILIRUB SERPL-MCNC: 0.2 MG/DL (ref 0.2–1.2)
BUN BLD-MCNC: 15 MG/DL (ref 6–20)
BUN/CREAT SERPL: 15.8 (ref 7–25)
CALCIUM SPEC-SCNC: 10.1 MG/DL (ref 8.6–10.5)
CHLORIDE SERPL-SCNC: 105 MMOL/L (ref 98–107)
CO2 SERPL-SCNC: 25 MMOL/L (ref 22–29)
CREAT BLD-MCNC: 0.95 MG/DL (ref 0.57–1)
ERYTHROCYTE [DISTWIDTH] IN BLOOD BY AUTOMATED COUNT: 13.1 % (ref 12.3–15.4)
GFR SERPL CREATININE-BSD FRML MDRD: 64 ML/MIN/1.73
GLOBULIN UR ELPH-MCNC: 2.9 GM/DL
GLUCOSE BLD-MCNC: 101 MG/DL (ref 65–99)
HCT VFR BLD AUTO: 41.8 % (ref 34–46.6)
HGB BLD-MCNC: 13.7 G/DL (ref 12–15.9)
LYMPHOCYTES # BLD AUTO: 1.8 10*3/MM3 (ref 0.7–3.1)
LYMPHOCYTES NFR BLD AUTO: 29.3 % (ref 19.6–45.3)
MCH RBC QN AUTO: 30.8 PG (ref 26.6–33)
MCHC RBC AUTO-ENTMCNC: 32.7 G/DL (ref 31.5–35.7)
MCV RBC AUTO: 94 FL (ref 79–97)
MONOCYTES # BLD AUTO: 0.5 10*3/MM3 (ref 0.1–0.9)
MONOCYTES NFR BLD AUTO: 7.4 % (ref 5–12)
NEUTROPHILS # BLD AUTO: 4 10*3/MM3 (ref 1.7–7)
NEUTROPHILS NFR BLD AUTO: 63.3 % (ref 42.7–76)
PLATELET # BLD AUTO: 275 10*3/MM3 (ref 140–450)
PMV BLD AUTO: 8.2 FL (ref 6–12)
POTASSIUM BLD-SCNC: 4.3 MMOL/L (ref 3.5–5.2)
PROT SERPL-MCNC: 7.6 G/DL (ref 6–8.5)
RBC # BLD AUTO: 4.45 10*6/MM3 (ref 3.77–5.28)
SODIUM BLD-SCNC: 140 MMOL/L (ref 136–145)
WBC NRBC COR # BLD: 6.3 10*3/MM3 (ref 3.4–10.8)

## 2019-10-16 PROCEDURE — 99213 OFFICE O/P EST LOW 20 MIN: CPT | Performed by: INTERNAL MEDICINE

## 2019-10-16 PROCEDURE — 36415 COLL VENOUS BLD VENIPUNCTURE: CPT

## 2019-10-16 PROCEDURE — 85025 COMPLETE CBC W/AUTO DIFF WBC: CPT

## 2019-10-16 PROCEDURE — 80053 COMPREHEN METABOLIC PANEL: CPT

## 2019-10-16 RX ORDER — TOPIRAMATE 50 MG/1
TABLET, FILM COATED ORAL DAILY
Refills: 6 | COMMUNITY
Start: 2019-09-23 | End: 2020-06-11

## 2019-10-16 RX ORDER — DULOXETIN HYDROCHLORIDE 30 MG/1
60 CAPSULE, DELAYED RELEASE ORAL DAILY
Refills: 6 | COMMUNITY
Start: 2019-09-23 | End: 2022-05-05 | Stop reason: DRUGHIGH

## 2020-02-20 ENCOUNTER — APPOINTMENT (OUTPATIENT)
Dept: LAB | Facility: HOSPITAL | Age: 45
End: 2020-02-20

## 2020-02-27 ENCOUNTER — TELEPHONE (OUTPATIENT)
Dept: ONCOLOGY | Facility: CLINIC | Age: 45
End: 2020-02-27

## 2020-02-27 DIAGNOSIS — Z17.1 MALIGNANT NEOPLASM OF RIGHT BREAST IN FEMALE, ESTROGEN RECEPTOR NEGATIVE, UNSPECIFIED SITE OF BREAST (HCC): Primary | ICD-10-CM

## 2020-02-27 DIAGNOSIS — C50.911 MALIGNANT NEOPLASM OF RIGHT BREAST IN FEMALE, ESTROGEN RECEPTOR NEGATIVE, UNSPECIFIED SITE OF BREAST (HCC): Primary | ICD-10-CM

## 2020-02-27 DIAGNOSIS — R19.7 DIARRHEA, UNSPECIFIED TYPE: ICD-10-CM

## 2020-02-27 NOTE — TELEPHONE ENCOUNTER
I spoke to Dr. Romano and called patient concerning her diarrhea and instructed patient on taking immodium and asked her to try and see her pcp.  Diarrhea has been going on for a few months intermittently.  Pt pcp is out so Dr Romano said to put in GI referral for patient.  I let patient know we were scheduling her with GI.

## 2020-02-27 NOTE — TELEPHONE ENCOUNTER
----- Message from Radha Bingham RN sent at 2/27/2020  3:47 PM EST -----      Patient called triage line stating she called PCP and they will be out tomorrow and all of next week. Requesting a call back for a referral of a possible GI doctor.

## 2020-03-12 ENCOUNTER — OFFICE VISIT (OUTPATIENT)
Dept: ONCOLOGY | Facility: CLINIC | Age: 45
End: 2020-03-12

## 2020-03-12 ENCOUNTER — LAB (OUTPATIENT)
Dept: LAB | Facility: HOSPITAL | Age: 45
End: 2020-03-12

## 2020-03-12 VITALS
RESPIRATION RATE: 18 BRPM | BODY MASS INDEX: 31.07 KG/M2 | HEART RATE: 99 BPM | DIASTOLIC BLOOD PRESSURE: 92 MMHG | OXYGEN SATURATION: 98 % | TEMPERATURE: 97 F | SYSTOLIC BLOOD PRESSURE: 132 MMHG | HEIGHT: 68 IN | WEIGHT: 205 LBS

## 2020-03-12 DIAGNOSIS — C50.911 MALIGNANT NEOPLASM OF RIGHT BREAST IN FEMALE, ESTROGEN RECEPTOR NEGATIVE, UNSPECIFIED SITE OF BREAST (HCC): ICD-10-CM

## 2020-03-12 DIAGNOSIS — Z17.1 MALIGNANT NEOPLASM OF RIGHT BREAST IN FEMALE, ESTROGEN RECEPTOR NEGATIVE, UNSPECIFIED SITE OF BREAST (HCC): Primary | ICD-10-CM

## 2020-03-12 DIAGNOSIS — Z17.1 MALIGNANT NEOPLASM OF RIGHT BREAST IN FEMALE, ESTROGEN RECEPTOR NEGATIVE, UNSPECIFIED SITE OF BREAST (HCC): ICD-10-CM

## 2020-03-12 DIAGNOSIS — C50.911 MALIGNANT NEOPLASM OF RIGHT BREAST IN FEMALE, ESTROGEN RECEPTOR NEGATIVE, UNSPECIFIED SITE OF BREAST (HCC): Primary | ICD-10-CM

## 2020-03-12 LAB
ALBUMIN SERPL-MCNC: 4.3 G/DL (ref 3.5–5.2)
ALBUMIN/GLOB SERPL: 1.5 G/DL
ALP SERPL-CCNC: 109 U/L (ref 39–117)
ALT SERPL W P-5'-P-CCNC: 35 U/L (ref 1–33)
ANION GAP SERPL CALCULATED.3IONS-SCNC: 12 MMOL/L (ref 5–15)
AST SERPL-CCNC: 26 U/L (ref 1–32)
BILIRUB SERPL-MCNC: 0.2 MG/DL (ref 0.2–1.2)
BUN BLD-MCNC: 13 MG/DL (ref 6–20)
BUN/CREAT SERPL: 16.3 (ref 7–25)
CALCIUM SPEC-SCNC: 9.7 MG/DL (ref 8.6–10.5)
CHLORIDE SERPL-SCNC: 103 MMOL/L (ref 98–107)
CO2 SERPL-SCNC: 26 MMOL/L (ref 22–29)
CREAT BLD-MCNC: 0.8 MG/DL (ref 0.57–1)
ERYTHROCYTE [DISTWIDTH] IN BLOOD BY AUTOMATED COUNT: 13 % (ref 12.3–15.4)
GFR SERPL CREATININE-BSD FRML MDRD: 78 ML/MIN/1.73
GLOBULIN UR ELPH-MCNC: 2.9 GM/DL
GLUCOSE BLD-MCNC: 121 MG/DL (ref 65–99)
HCT VFR BLD AUTO: 41.4 % (ref 34–46.6)
HGB BLD-MCNC: 13.8 G/DL (ref 12–15.9)
LYMPHOCYTES # BLD AUTO: 1.5 10*3/MM3 (ref 0.7–3.1)
LYMPHOCYTES NFR BLD AUTO: 36.5 % (ref 19.6–45.3)
MCH RBC QN AUTO: 30.2 PG (ref 26.6–33)
MCHC RBC AUTO-ENTMCNC: 33.3 G/DL (ref 31.5–35.7)
MCV RBC AUTO: 90.5 FL (ref 79–97)
MONOCYTES # BLD AUTO: 0.2 10*3/MM3 (ref 0.1–0.9)
MONOCYTES NFR BLD AUTO: 5.6 % (ref 5–12)
NEUTROPHILS # BLD AUTO: 2.4 10*3/MM3 (ref 1.7–7)
NEUTROPHILS NFR BLD AUTO: 57.9 % (ref 42.7–76)
PLATELET # BLD AUTO: 268 10*3/MM3 (ref 140–450)
PMV BLD AUTO: 7.9 FL (ref 6–12)
POTASSIUM BLD-SCNC: 4.1 MMOL/L (ref 3.5–5.2)
PROT SERPL-MCNC: 7.2 G/DL (ref 6–8.5)
RBC # BLD AUTO: 4.58 10*6/MM3 (ref 3.77–5.28)
SODIUM BLD-SCNC: 141 MMOL/L (ref 136–145)
WBC NRBC COR # BLD: 4.2 10*3/MM3 (ref 3.4–10.8)

## 2020-03-12 PROCEDURE — 80053 COMPREHEN METABOLIC PANEL: CPT

## 2020-03-12 PROCEDURE — 36415 COLL VENOUS BLD VENIPUNCTURE: CPT

## 2020-03-12 PROCEDURE — 85025 COMPLETE CBC W/AUTO DIFF WBC: CPT

## 2020-03-12 PROCEDURE — 99213 OFFICE O/P EST LOW 20 MIN: CPT | Performed by: INTERNAL MEDICINE

## 2020-03-12 NOTE — PROGRESS NOTES
PROBLEM LIST:  1. 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  Biopsy showed high grade invasive ductal carcinoma, ER negative, NC negative, HER-2 negative.  The mass on imaging measures approximately 9.7 cm.  Enlarged abnormal lymph nodes are present on imaging.  FNA of 1 axillary lymph node was negative for malignancy.  PET/CT on 1/12/18 showed large necrotic right breast mass, 2 pathologically enlarged and hypermetabolic intramammary nodes, and no evidence of distant disease.  Breast MRI on 1/17/18 showed evidence of pectoral muscle involvement, as well as several satellite lesions.  B) neoadjuvant ddAC followed by weekly taxol started on 1/23/17.  Taxol stopped after 10 doses due to persistent tachycardia  C) bilateral mastectomy on 6/21/18.  Residual 5 mm high grade IDC in the right breast.  0/4 LN involved.  [id number]  D) adjuvant radiation completed 8/27/18.  2.  Hypertension  3.  Anxiety  4.  Bell's palsy  5. Sinus tachycardia    Subjective      CC: breast cancer    HISTORY OF PRESENT ILLNESS:   Estefany Bryan returns today for follow-up.  She has been having some issues with diarrhea.  This has been going on for several months.  It did get worse when she was taking Topamax.  She thinks she might of had a GI illness.  Currently she is doing okay but the diarrhea comes and goes.  She says her normal baseline is to have 3-4 bowel movements daily.  Otherwise she has been doing well.  Hearing about Zahra Vela's breast cancer recurrence has made her very anxious.    Past Medical History, Past Surgical History, Social History, Family History have been reviewed and are without significant changes except as mentioned.    Review of Systems   A comprehensive 14 point review of systems was performed and was negative except as mentioned.    Medications:  The current medication  "list was reviewed in the EMR    ALLERGIES:    Allergies   Allergen Reactions   • Chlorhexidine Other (See Comments)     Pt. developed redness and burning sensation after using.   • Codeine GI Intolerance     If take with nexium pt is okay  or if she can have zofran with       Objective      /92 Comment: Left Wrist  Pulse 99   Temp 97 °F (36.1 °C) (Temporal)   Resp 18   Ht 172.7 cm (68\")   Wt 93 kg (205 lb)   SpO2 98% Comment: RA  BMI 31.17 kg/m²      Performance Status: 0    General: well appearing female in no acute distress  Neuro: alert and oriented  HEENT: sclera anicteric, oropharynx clear  Lymphatics: no cervical, supraclavicular adenopathy. No palpable axillary adenopathy  Chest: Status post bilateral mastectomy.  No palpable masses or lesions  Cardiovascular: regular rate and rhythm, no murmurs  Lungs: clear to auscultation bilaterally  Abdomen: soft, nontender, nondistended.  No palpable organomegaly  Extremeties: no lower extremity edema  Skin: no rashes, lesions, bruising, or petechiae  Psych: mood and affect appropriate    Labs:  Lab Results   Component Value Date    WBC 4.20 03/12/2020    HGB 13.8 03/12/2020    HCT 41.4 03/12/2020    MCV 90.5 03/12/2020     03/12/2020     Lab Results   Component Value Date    GLUCOSE 101 (H) 10/16/2019    BUN 15 10/16/2019    CREATININE 0.95 10/16/2019    EGFRIFNONA 64 10/16/2019    BCR 15.8 10/16/2019    K 4.3 10/16/2019    CO2 25.0 10/16/2019    CALCIUM 10.1 10/16/2019    ALBUMIN 4.70 10/16/2019    LABIL2 1.4 08/01/2015    AST 18 10/16/2019    ALT 29 10/16/2019       Assessment/Plan   Estefany Bryan is a 44 y.o. year old female with [id number] triple negative breast cancer who returns for follow up.   She continues to do well with no evidence of recurrent disease.    She is scheduled to see gastroenterology next week for evaluation of her chronic diarrhea.      She requests referral to plastic surgery for discussion of " reconstruction.    Follow-up in 4 months with labs.  After that we will space out visits to every 6 months.              I spent 15 minutes with the patient. I spent > 50% percent of this time counseling and discussing prognosis, diagnostic testing, evaluation, current status and management.    Winsome Romano MD  [facility] Hematology and Oncology    3/12/2020          CC:

## 2020-03-17 ENCOUNTER — TELEPHONE (OUTPATIENT)
Dept: GASTROENTEROLOGY | Facility: CLINIC | Age: 45
End: 2020-03-17

## 2020-03-17 NOTE — TELEPHONE ENCOUNTER
I called patient regarding no show appointment on 3/16/2020 with Rola. No answer; left voice message.

## 2020-06-14 ENCOUNTER — APPOINTMENT (OUTPATIENT)
Dept: PREADMISSION TESTING | Facility: HOSPITAL | Age: 45
End: 2020-06-14

## 2020-06-14 VITALS — WEIGHT: 214.95 LBS | HEIGHT: 68 IN | BODY MASS INDEX: 32.58 KG/M2

## 2020-06-14 LAB
ANION GAP SERPL CALCULATED.3IONS-SCNC: 12 MMOL/L (ref 5–15)
BUN BLD-MCNC: 14 MG/DL (ref 6–20)
BUN/CREAT SERPL: 16.7 (ref 7–25)
CALCIUM SPEC-SCNC: 9.6 MG/DL (ref 8.6–10.5)
CHLORIDE SERPL-SCNC: 104 MMOL/L (ref 98–107)
CO2 SERPL-SCNC: 25 MMOL/L (ref 22–29)
CREAT BLD-MCNC: 0.84 MG/DL (ref 0.57–1)
DEPRECATED RDW RBC AUTO: 43.4 FL (ref 37–54)
ERYTHROCYTE [DISTWIDTH] IN BLOOD BY AUTOMATED COUNT: 12.8 % (ref 12.3–15.4)
GFR SERPL CREATININE-BSD FRML MDRD: 74 ML/MIN/1.73
GLUCOSE BLD-MCNC: 110 MG/DL (ref 65–99)
HCT VFR BLD AUTO: 38.9 % (ref 34–46.6)
HGB BLD-MCNC: 12.9 G/DL (ref 12–15.9)
MCH RBC QN AUTO: 30.7 PG (ref 26.6–33)
MCHC RBC AUTO-ENTMCNC: 33.2 G/DL (ref 31.5–35.7)
MCV RBC AUTO: 92.6 FL (ref 79–97)
PLATELET # BLD AUTO: 267 10*3/MM3 (ref 140–450)
PMV BLD AUTO: 10.5 FL (ref 6–12)
POTASSIUM BLD-SCNC: 4.6 MMOL/L (ref 3.5–5.2)
RBC # BLD AUTO: 4.2 10*6/MM3 (ref 3.77–5.28)
SODIUM BLD-SCNC: 141 MMOL/L (ref 136–145)
WBC NRBC COR # BLD: 4.52 10*3/MM3 (ref 3.4–10.8)

## 2020-06-14 PROCEDURE — C9803 HOPD COVID-19 SPEC COLLECT: HCPCS

## 2020-06-14 PROCEDURE — 80048 BASIC METABOLIC PNL TOTAL CA: CPT | Performed by: PLASTIC SURGERY

## 2020-06-14 PROCEDURE — U0004 COV-19 TEST NON-CDC HGH THRU: HCPCS

## 2020-06-14 PROCEDURE — 85027 COMPLETE CBC AUTOMATED: CPT | Performed by: PLASTIC SURGERY

## 2020-06-14 PROCEDURE — 36415 COLL VENOUS BLD VENIPUNCTURE: CPT

## 2020-06-14 PROCEDURE — U0002 COVID-19 LAB TEST NON-CDC: HCPCS

## 2020-06-14 PROCEDURE — 93005 ELECTROCARDIOGRAM TRACING: CPT

## 2020-06-14 PROCEDURE — 93010 ELECTROCARDIOGRAM REPORT: CPT | Performed by: INTERNAL MEDICINE

## 2020-06-14 RX ORDER — FAMOTIDINE 20 MG/1
20 TABLET, FILM COATED ORAL DAILY
COMMUNITY
End: 2020-09-23

## 2020-06-14 RX ORDER — METOPROLOL SUCCINATE 50 MG/1
50 TABLET, EXTENDED RELEASE ORAL DAILY
COMMUNITY
End: 2020-09-23

## 2020-06-14 NOTE — PAT
Database was done by previous nurse in pat few days ago-  Pt states all those answers were correct and nothing needed to be added or changed.      Patient instructed to drink 20 ounces (or until full) of Gatorade and it needs to be completed 1 hour before given arrival time for procedure (NO RED Gatorade)    Patient verbalized understanding.        Pt has allergy to chlorhexidine so provided with antibacterial wipes- pt verbalize understanding.

## 2020-06-15 ENCOUNTER — ANESTHESIA EVENT (OUTPATIENT)
Dept: PERIOP | Facility: HOSPITAL | Age: 45
End: 2020-06-15

## 2020-06-15 LAB
REF LAB TEST METHOD: NORMAL
SARS-COV-2 RNA RESP QL NAA+PROBE: NOT DETECTED

## 2020-06-15 RX ORDER — SODIUM CHLORIDE 0.9 % (FLUSH) 0.9 %
10 SYRINGE (ML) INJECTION EVERY 12 HOURS SCHEDULED
Status: CANCELLED | OUTPATIENT
Start: 2020-06-15

## 2020-06-15 RX ORDER — SODIUM CHLORIDE 0.9 % (FLUSH) 0.9 %
10 SYRINGE (ML) INJECTION AS NEEDED
Status: CANCELLED | OUTPATIENT
Start: 2020-06-15

## 2020-06-16 ENCOUNTER — ANESTHESIA (OUTPATIENT)
Dept: PERIOP | Facility: HOSPITAL | Age: 45
End: 2020-06-16

## 2020-06-16 ENCOUNTER — HOSPITAL ENCOUNTER (OUTPATIENT)
Facility: HOSPITAL | Age: 45
Discharge: HOME | End: 2020-06-17
Attending: PLASTIC SURGERY | Admitting: PLASTIC SURGERY

## 2020-06-16 DIAGNOSIS — C50.919 BREAST CANCER (HCC): ICD-10-CM

## 2020-06-16 PROCEDURE — G0378 HOSPITAL OBSERVATION PER HR: HCPCS

## 2020-06-16 PROCEDURE — 25010000002 PROPOFOL 10 MG/ML EMULSION: Performed by: NURSE ANESTHETIST, CERTIFIED REGISTERED

## 2020-06-16 PROCEDURE — 25010000002 PHENYLEPHRINE PER 1 ML: Performed by: NURSE ANESTHETIST, CERTIFIED REGISTERED

## 2020-06-16 PROCEDURE — 88302 TISSUE EXAM BY PATHOLOGIST: CPT | Performed by: PLASTIC SURGERY

## 2020-06-16 PROCEDURE — 25010000002 HYDROMORPHONE PER 4 MG: Performed by: NURSE ANESTHETIST, CERTIFIED REGISTERED

## 2020-06-16 PROCEDURE — 25010000003 CEFAZOLIN IN DEXTROSE 2-4 GM/100ML-% SOLUTION: Performed by: PLASTIC SURGERY

## 2020-06-16 PROCEDURE — 25010000002 NEOSTIGMINE 10 MG/10ML SOLUTION: Performed by: NURSE ANESTHETIST, CERTIFIED REGISTERED

## 2020-06-16 PROCEDURE — 25010000002 BUPRENORPHINE PER 0.1 MG: Performed by: ANESTHESIOLOGY

## 2020-06-16 PROCEDURE — 25010000002 FENTANYL CITRATE (PF) 100 MCG/2ML SOLUTION: Performed by: NURSE ANESTHETIST, CERTIFIED REGISTERED

## 2020-06-16 PROCEDURE — 25010000002 DEXAMETHASONE PER 1 MG: Performed by: NURSE ANESTHETIST, CERTIFIED REGISTERED

## 2020-06-16 PROCEDURE — 25010000002 ONDANSETRON PER 1 MG: Performed by: NURSE ANESTHETIST, CERTIFIED REGISTERED

## 2020-06-16 PROCEDURE — 25010000002 PROMETHAZINE PER 50 MG: Performed by: NURSE ANESTHETIST, CERTIFIED REGISTERED

## 2020-06-16 PROCEDURE — 25010000002 DEXAMETHASONE SODIUM PHOSPHATE 10 MG/ML SOLUTION: Performed by: ANESTHESIOLOGY

## 2020-06-16 PROCEDURE — C1789 PROSTHESIS, BREAST, IMP: HCPCS | Performed by: PLASTIC SURGERY

## 2020-06-16 PROCEDURE — 25010000002 MORPHINE PER 10 MG: Performed by: PLASTIC SURGERY

## 2020-06-16 DEVICE — LIGACLIP MCA MULTIPLE CLIP APPLIERS, 20 MEDIUM CLIPS
Type: IMPLANTABLE DEVICE | Site: BREAST | Status: FUNCTIONAL
Brand: LIGACLIP

## 2020-06-16 DEVICE — NATRELLE TE SMOOTH 133S-FX-13-T (US)
Type: IMPLANTABLE DEVICE | Site: BREAST | Status: FUNCTIONAL
Brand: NATRELLE 133S TISSUE EXPANDERS

## 2020-06-16 DEVICE — GRFT TISS ALLODERM RTM PERF 16X20CM 2.4MM/THK .4MM: Type: IMPLANTABLE DEVICE | Site: BREAST | Status: FUNCTIONAL

## 2020-06-16 RX ORDER — SODIUM CHLORIDE 9 MG/ML
INJECTION, SOLUTION INTRAVENOUS AS NEEDED
Status: DISCONTINUED | OUTPATIENT
Start: 2020-06-16 | End: 2020-06-16 | Stop reason: HOSPADM

## 2020-06-16 RX ORDER — PROMETHAZINE HYDROCHLORIDE 25 MG/1
25 SUPPOSITORY RECTAL ONCE AS NEEDED
Status: DISCONTINUED | OUTPATIENT
Start: 2020-06-16 | End: 2020-06-16 | Stop reason: HOSPADM

## 2020-06-16 RX ORDER — PROMETHAZINE HYDROCHLORIDE 25 MG/ML
12.5 INJECTION, SOLUTION INTRAMUSCULAR; INTRAVENOUS ONCE AS NEEDED
Status: DISCONTINUED | OUTPATIENT
Start: 2020-06-16 | End: 2020-06-16 | Stop reason: HOSPADM

## 2020-06-16 RX ORDER — HYDROMORPHONE HYDROCHLORIDE 1 MG/ML
0.5 INJECTION, SOLUTION INTRAMUSCULAR; INTRAVENOUS; SUBCUTANEOUS
Status: DISCONTINUED | OUTPATIENT
Start: 2020-06-16 | End: 2020-06-16 | Stop reason: HOSPADM

## 2020-06-16 RX ORDER — ESMOLOL HYDROCHLORIDE 10 MG/ML
INJECTION INTRAVENOUS AS NEEDED
Status: DISCONTINUED | OUTPATIENT
Start: 2020-06-16 | End: 2020-06-16 | Stop reason: SURG

## 2020-06-16 RX ORDER — ALPRAZOLAM 1 MG/1
1 TABLET ORAL 3 TIMES DAILY
Status: DISCONTINUED | OUTPATIENT
Start: 2020-06-16 | End: 2020-06-17 | Stop reason: HOSPADM

## 2020-06-16 RX ORDER — FENTANYL CITRATE 50 UG/ML
50 INJECTION, SOLUTION INTRAMUSCULAR; INTRAVENOUS
Status: DISCONTINUED | OUTPATIENT
Start: 2020-06-16 | End: 2020-06-16 | Stop reason: HOSPADM

## 2020-06-16 RX ORDER — SODIUM CHLORIDE 9 MG/ML
25 INJECTION, SOLUTION INTRAVENOUS CONTINUOUS
Status: DISCONTINUED | OUTPATIENT
Start: 2020-06-16 | End: 2020-06-17 | Stop reason: HOSPADM

## 2020-06-16 RX ORDER — CEFAZOLIN SODIUM 2 G/100ML
2 INJECTION, SOLUTION INTRAVENOUS ONCE
Status: COMPLETED | OUTPATIENT
Start: 2020-06-16 | End: 2020-06-16

## 2020-06-16 RX ORDER — DEXAMETHASONE SODIUM PHOSPHATE 10 MG/ML
INJECTION, SOLUTION INTRAMUSCULAR; INTRAVENOUS
Status: COMPLETED | OUTPATIENT
Start: 2020-06-16 | End: 2020-06-16

## 2020-06-16 RX ORDER — GLYCOPYRROLATE 0.2 MG/ML
INJECTION INTRAMUSCULAR; INTRAVENOUS AS NEEDED
Status: DISCONTINUED | OUTPATIENT
Start: 2020-06-16 | End: 2020-06-16 | Stop reason: SURG

## 2020-06-16 RX ORDER — FAMOTIDINE 20 MG/1
20 TABLET, FILM COATED ORAL DAILY
Status: DISCONTINUED | OUTPATIENT
Start: 2020-06-16 | End: 2020-06-17 | Stop reason: HOSPADM

## 2020-06-16 RX ORDER — PROMETHAZINE HYDROCHLORIDE 25 MG/ML
INJECTION, SOLUTION INTRAMUSCULAR; INTRAVENOUS AS NEEDED
Status: DISCONTINUED | OUTPATIENT
Start: 2020-06-16 | End: 2020-06-16 | Stop reason: SURG

## 2020-06-16 RX ORDER — NEOSTIGMINE METHYLSULFATE 1 MG/ML
INJECTION, SOLUTION INTRAVENOUS AS NEEDED
Status: DISCONTINUED | OUTPATIENT
Start: 2020-06-16 | End: 2020-06-16 | Stop reason: SURG

## 2020-06-16 RX ORDER — OXYCODONE HYDROCHLORIDE 5 MG/1
5 TABLET ORAL EVERY 4 HOURS PRN
Status: DISCONTINUED | OUTPATIENT
Start: 2020-06-16 | End: 2020-06-17 | Stop reason: HOSPADM

## 2020-06-16 RX ORDER — PROMETHAZINE HYDROCHLORIDE 25 MG/ML
6.25 INJECTION, SOLUTION INTRAMUSCULAR; INTRAVENOUS EVERY 6 HOURS PRN
Status: DISCONTINUED | OUTPATIENT
Start: 2020-06-16 | End: 2020-06-17 | Stop reason: HOSPADM

## 2020-06-16 RX ORDER — CEFAZOLIN SODIUM 2 G/100ML
2 INJECTION, SOLUTION INTRAVENOUS EVERY 8 HOURS
Status: DISCONTINUED | OUTPATIENT
Start: 2020-06-16 | End: 2020-06-17 | Stop reason: HOSPADM

## 2020-06-16 RX ORDER — DIPHENHYDRAMINE HYDROCHLORIDE 50 MG/ML
12.5 INJECTION INTRAMUSCULAR; INTRAVENOUS EVERY 6 HOURS PRN
Status: DISCONTINUED | OUTPATIENT
Start: 2020-06-16 | End: 2020-06-17 | Stop reason: HOSPADM

## 2020-06-16 RX ORDER — NALOXONE HCL 0.4 MG/ML
0.4 VIAL (ML) INJECTION
Status: DISCONTINUED | OUTPATIENT
Start: 2020-06-16 | End: 2020-06-17 | Stop reason: HOSPADM

## 2020-06-16 RX ORDER — PROMETHAZINE HYDROCHLORIDE 25 MG/1
25 TABLET ORAL ONCE AS NEEDED
Status: DISCONTINUED | OUTPATIENT
Start: 2020-06-16 | End: 2020-06-16 | Stop reason: HOSPADM

## 2020-06-16 RX ORDER — METOPROLOL SUCCINATE 50 MG/1
50 TABLET, EXTENDED RELEASE ORAL DAILY
Status: DISCONTINUED | OUTPATIENT
Start: 2020-06-16 | End: 2020-06-17 | Stop reason: HOSPADM

## 2020-06-16 RX ORDER — LIDOCAINE HYDROCHLORIDE AND EPINEPHRINE 10; 10 MG/ML; UG/ML
INJECTION, SOLUTION INFILTRATION; PERINEURAL AS NEEDED
Status: DISCONTINUED | OUTPATIENT
Start: 2020-06-16 | End: 2020-06-16 | Stop reason: HOSPADM

## 2020-06-16 RX ORDER — ONDANSETRON 2 MG/ML
INJECTION INTRAMUSCULAR; INTRAVENOUS AS NEEDED
Status: DISCONTINUED | OUTPATIENT
Start: 2020-06-16 | End: 2020-06-16 | Stop reason: SURG

## 2020-06-16 RX ORDER — LIDOCAINE HYDROCHLORIDE 10 MG/ML
0.5 INJECTION, SOLUTION EPIDURAL; INFILTRATION; INTRACAUDAL; PERINEURAL ONCE AS NEEDED
Status: COMPLETED | OUTPATIENT
Start: 2020-06-16 | End: 2020-06-16

## 2020-06-16 RX ORDER — ACETAMINOPHEN 500 MG
1000 TABLET ORAL EVERY 6 HOURS
Status: DISCONTINUED | OUTPATIENT
Start: 2020-06-16 | End: 2020-06-17 | Stop reason: HOSPADM

## 2020-06-16 RX ORDER — BUPIVACAINE HYDROCHLORIDE 2.5 MG/ML
INJECTION, SOLUTION EPIDURAL; INFILTRATION; INTRACAUDAL
Status: COMPLETED | OUTPATIENT
Start: 2020-06-16 | End: 2020-06-16

## 2020-06-16 RX ORDER — FAMOTIDINE 20 MG/1
20 TABLET, FILM COATED ORAL
Status: DISCONTINUED | OUTPATIENT
Start: 2020-06-16 | End: 2020-06-16 | Stop reason: HOSPADM

## 2020-06-16 RX ORDER — PROPOFOL 10 MG/ML
VIAL (ML) INTRAVENOUS AS NEEDED
Status: DISCONTINUED | OUTPATIENT
Start: 2020-06-16 | End: 2020-06-16 | Stop reason: SURG

## 2020-06-16 RX ORDER — DEXAMETHASONE SODIUM PHOSPHATE 4 MG/ML
INJECTION, SOLUTION INTRA-ARTICULAR; INTRALESIONAL; INTRAMUSCULAR; INTRAVENOUS; SOFT TISSUE AS NEEDED
Status: DISCONTINUED | OUTPATIENT
Start: 2020-06-16 | End: 2020-06-16 | Stop reason: SURG

## 2020-06-16 RX ORDER — BUPRENORPHINE HYDROCHLORIDE 0.32 MG/ML
INJECTION INTRAMUSCULAR; INTRAVENOUS
Status: COMPLETED | OUTPATIENT
Start: 2020-06-16 | End: 2020-06-16

## 2020-06-16 RX ORDER — ONDANSETRON 2 MG/ML
4 INJECTION INTRAMUSCULAR; INTRAVENOUS ONCE AS NEEDED
Status: DISCONTINUED | OUTPATIENT
Start: 2020-06-16 | End: 2020-06-16 | Stop reason: HOSPADM

## 2020-06-16 RX ORDER — ROCURONIUM BROMIDE 10 MG/ML
INJECTION, SOLUTION INTRAVENOUS AS NEEDED
Status: DISCONTINUED | OUTPATIENT
Start: 2020-06-16 | End: 2020-06-16 | Stop reason: SURG

## 2020-06-16 RX ORDER — SCOLOPAMINE TRANSDERMAL SYSTEM 1 MG/1
1 PATCH, EXTENDED RELEASE TRANSDERMAL ONCE
Status: DISCONTINUED | OUTPATIENT
Start: 2020-06-16 | End: 2020-06-16

## 2020-06-16 RX ORDER — LABETALOL HYDROCHLORIDE 5 MG/ML
5 INJECTION, SOLUTION INTRAVENOUS
Status: DISCONTINUED | OUTPATIENT
Start: 2020-06-16 | End: 2020-06-16 | Stop reason: HOSPADM

## 2020-06-16 RX ORDER — LIDOCAINE HYDROCHLORIDE 10 MG/ML
INJECTION, SOLUTION EPIDURAL; INFILTRATION; INTRACAUDAL; PERINEURAL AS NEEDED
Status: DISCONTINUED | OUTPATIENT
Start: 2020-06-16 | End: 2020-06-16 | Stop reason: SURG

## 2020-06-16 RX ORDER — MAGNESIUM HYDROXIDE 1200 MG/15ML
LIQUID ORAL AS NEEDED
Status: DISCONTINUED | OUTPATIENT
Start: 2020-06-16 | End: 2020-06-16 | Stop reason: HOSPADM

## 2020-06-16 RX ORDER — SODIUM CHLORIDE, SODIUM LACTATE, POTASSIUM CHLORIDE, CALCIUM CHLORIDE 600; 310; 30; 20 MG/100ML; MG/100ML; MG/100ML; MG/100ML
INJECTION, SOLUTION INTRAVENOUS CONTINUOUS PRN
Status: DISCONTINUED | OUTPATIENT
Start: 2020-06-16 | End: 2020-06-16 | Stop reason: SURG

## 2020-06-16 RX ORDER — SODIUM CHLORIDE, SODIUM LACTATE, POTASSIUM CHLORIDE, CALCIUM CHLORIDE 600; 310; 30; 20 MG/100ML; MG/100ML; MG/100ML; MG/100ML
9 INJECTION, SOLUTION INTRAVENOUS CONTINUOUS PRN
Status: DISCONTINUED | OUTPATIENT
Start: 2020-06-16 | End: 2020-06-16 | Stop reason: HOSPADM

## 2020-06-16 RX ORDER — ONDANSETRON 2 MG/ML
4 INJECTION INTRAMUSCULAR; INTRAVENOUS EVERY 6 HOURS PRN
Status: DISCONTINUED | OUTPATIENT
Start: 2020-06-16 | End: 2020-06-17 | Stop reason: HOSPADM

## 2020-06-16 RX ORDER — ACETAMINOPHEN 650 MG
TABLET, EXTENDED RELEASE ORAL AS NEEDED
Status: DISCONTINUED | OUTPATIENT
Start: 2020-06-16 | End: 2020-06-16 | Stop reason: HOSPADM

## 2020-06-16 RX ORDER — IPRATROPIUM BROMIDE AND ALBUTEROL SULFATE 2.5; .5 MG/3ML; MG/3ML
3 SOLUTION RESPIRATORY (INHALATION) ONCE AS NEEDED
Status: DISCONTINUED | OUTPATIENT
Start: 2020-06-16 | End: 2020-06-16 | Stop reason: HOSPADM

## 2020-06-16 RX ORDER — MORPHINE SULFATE 2 MG/ML
2 INJECTION, SOLUTION INTRAMUSCULAR; INTRAVENOUS
Status: DISCONTINUED | OUTPATIENT
Start: 2020-06-16 | End: 2020-06-17 | Stop reason: HOSPADM

## 2020-06-16 RX ORDER — DULOXETIN HYDROCHLORIDE 30 MG/1
30 CAPSULE, DELAYED RELEASE ORAL DAILY
Status: DISCONTINUED | OUTPATIENT
Start: 2020-06-16 | End: 2020-06-17 | Stop reason: HOSPADM

## 2020-06-16 RX ORDER — FENTANYL CITRATE 50 UG/ML
INJECTION, SOLUTION INTRAMUSCULAR; INTRAVENOUS AS NEEDED
Status: DISCONTINUED | OUTPATIENT
Start: 2020-06-16 | End: 2020-06-16 | Stop reason: SURG

## 2020-06-16 RX ADMIN — DEXAMETHASONE SODIUM PHOSPHATE 8 MG: 4 INJECTION, SOLUTION INTRAMUSCULAR; INTRAVENOUS at 08:00

## 2020-06-16 RX ADMIN — NEOSTIGMINE 3 MG: 1 INJECTION INTRAVENOUS at 13:44

## 2020-06-16 RX ADMIN — PROPOFOL 25 MCG/KG/MIN: 10 INJECTION, EMULSION INTRAVENOUS at 07:56

## 2020-06-16 RX ADMIN — ROCURONIUM BROMIDE 10 MG: 10 INJECTION INTRAVENOUS at 11:40

## 2020-06-16 RX ADMIN — BUPIVACAINE HYDROCHLORIDE 30 ML: 2.5 INJECTION, SOLUTION EPIDURAL; INFILTRATION; INTRACAUDAL; PERINEURAL at 07:52

## 2020-06-16 RX ADMIN — FENTANYL CITRATE 50 MCG: 50 INJECTION, SOLUTION INTRAMUSCULAR; INTRAVENOUS at 10:36

## 2020-06-16 RX ADMIN — ROCURONIUM BROMIDE 10 MG: 10 INJECTION INTRAVENOUS at 12:56

## 2020-06-16 RX ADMIN — HYDROMORPHONE HYDROCHLORIDE 0.5 MG: 1 INJECTION, SOLUTION INTRAMUSCULAR; INTRAVENOUS; SUBCUTANEOUS at 14:37

## 2020-06-16 RX ADMIN — BUPRENORPHINE HYDROCHLORIDE 0.15 MG: 0.32 INJECTION INTRAMUSCULAR; INTRAVENOUS at 07:48

## 2020-06-16 RX ADMIN — PROPOFOL 200 MG: 10 INJECTION, EMULSION INTRAVENOUS at 07:42

## 2020-06-16 RX ADMIN — FAMOTIDINE 20 MG: 20 TABLET, FILM COATED ORAL at 06:45

## 2020-06-16 RX ADMIN — FAMOTIDINE 20 MG: 20 TABLET, FILM COATED ORAL at 16:42

## 2020-06-16 RX ADMIN — MORPHINE SULFATE 2 MG: 2 INJECTION, SOLUTION INTRAMUSCULAR; INTRAVENOUS at 18:06

## 2020-06-16 RX ADMIN — CEFAZOLIN SODIUM 2 G: 2 INJECTION, SOLUTION INTRAVENOUS at 07:38

## 2020-06-16 RX ADMIN — ESMOLOL HYDROCHLORIDE 20 MG: 10 INJECTION, SOLUTION INTRAVENOUS at 07:44

## 2020-06-16 RX ADMIN — SODIUM CHLORIDE, POTASSIUM CHLORIDE, SODIUM LACTATE AND CALCIUM CHLORIDE 9 ML/HR: 600; 310; 30; 20 INJECTION, SOLUTION INTRAVENOUS at 06:45

## 2020-06-16 RX ADMIN — ROCURONIUM BROMIDE 10 MG: 10 INJECTION INTRAVENOUS at 10:37

## 2020-06-16 RX ADMIN — SODIUM CHLORIDE, POTASSIUM CHLORIDE, SODIUM LACTATE AND CALCIUM CHLORIDE: 600; 310; 30; 20 INJECTION, SOLUTION INTRAVENOUS at 07:38

## 2020-06-16 RX ADMIN — ACETAMINOPHEN 1000 MG: 500 TABLET, FILM COATED ORAL at 17:16

## 2020-06-16 RX ADMIN — CEFAZOLIN SODIUM 2 G: 2 INJECTION, SOLUTION INTRAVENOUS at 20:05

## 2020-06-16 RX ADMIN — PROMETHAZINE HYDROCHLORIDE 5 MG: 25 INJECTION INTRAMUSCULAR; INTRAVENOUS at 07:55

## 2020-06-16 RX ADMIN — ALPRAZOLAM 1 MG: 1 TABLET ORAL at 20:05

## 2020-06-16 RX ADMIN — DULOXETINE HYDROCHLORIDE 30 MG: 30 CAPSULE, DELAYED RELEASE ORAL at 16:42

## 2020-06-16 RX ADMIN — CEFAZOLIN SODIUM 2 G: 2 INJECTION, SOLUTION INTRAVENOUS at 11:32

## 2020-06-16 RX ADMIN — ROCURONIUM BROMIDE 20 MG: 10 INJECTION INTRAVENOUS at 09:30

## 2020-06-16 RX ADMIN — EPHEDRINE SULFATE 10 MG: 50 INJECTION INTRAMUSCULAR; INTRAVENOUS; SUBCUTANEOUS at 08:39

## 2020-06-16 RX ADMIN — OXYCODONE 5 MG: 5 TABLET ORAL at 16:42

## 2020-06-16 RX ADMIN — SODIUM CHLORIDE, POTASSIUM CHLORIDE, SODIUM LACTATE AND CALCIUM CHLORIDE: 600; 310; 30; 20 INJECTION, SOLUTION INTRAVENOUS at 12:54

## 2020-06-16 RX ADMIN — SCOPALAMINE 1 PATCH: 1 PATCH, EXTENDED RELEASE TRANSDERMAL at 07:14

## 2020-06-16 RX ADMIN — SODIUM CHLORIDE 25 ML/HR: 9 INJECTION, SOLUTION INTRAVENOUS at 17:15

## 2020-06-16 RX ADMIN — SODIUM CHLORIDE, POTASSIUM CHLORIDE, SODIUM LACTATE AND CALCIUM CHLORIDE: 600; 310; 30; 20 INJECTION, SOLUTION INTRAVENOUS at 13:49

## 2020-06-16 RX ADMIN — LIDOCAINE HYDROCHLORIDE 50 MG: 10 INJECTION, SOLUTION EPIDURAL; INFILTRATION; INTRACAUDAL; PERINEURAL at 07:42

## 2020-06-16 RX ADMIN — PHENYLEPHRINE HYDROCHLORIDE 80 MCG: 10 INJECTION INTRAVENOUS at 11:26

## 2020-06-16 RX ADMIN — PHENYLEPHRINE HYDROCHLORIDE 80 MCG: 10 INJECTION INTRAVENOUS at 12:36

## 2020-06-16 RX ADMIN — METOPROLOL SUCCINATE 50 MG: 50 TABLET, EXTENDED RELEASE ORAL at 16:42

## 2020-06-16 RX ADMIN — MORPHINE SULFATE 2 MG: 2 INJECTION, SOLUTION INTRAMUSCULAR; INTRAVENOUS at 20:15

## 2020-06-16 RX ADMIN — ROCURONIUM BROMIDE 50 MG: 10 INJECTION INTRAVENOUS at 07:42

## 2020-06-16 RX ADMIN — ALPRAZOLAM 1 MG: 1 TABLET ORAL at 16:34

## 2020-06-16 RX ADMIN — LIDOCAINE HYDROCHLORIDE 0.2 ML: 10 INJECTION, SOLUTION EPIDURAL; INFILTRATION; INTRACAUDAL; PERINEURAL at 06:45

## 2020-06-16 RX ADMIN — FENTANYL CITRATE 25 MCG: 50 INJECTION, SOLUTION INTRAMUSCULAR; INTRAVENOUS at 09:45

## 2020-06-16 RX ADMIN — EPHEDRINE SULFATE 10 MG: 50 INJECTION INTRAMUSCULAR; INTRAVENOUS; SUBCUTANEOUS at 08:49

## 2020-06-16 RX ADMIN — BUPRENORPHINE HYDROCHLORIDE 0.15 MG: 0.32 INJECTION INTRAMUSCULAR; INTRAVENOUS at 07:52

## 2020-06-16 RX ADMIN — DEXAMETHASONE SODIUM PHOSPHATE 2 MG: 10 INJECTION INTRAMUSCULAR; INTRAVENOUS at 07:52

## 2020-06-16 RX ADMIN — PHENYLEPHRINE HYDROCHLORIDE 160 MCG: 10 INJECTION INTRAVENOUS at 11:28

## 2020-06-16 RX ADMIN — ACETAMINOPHEN 1000 MG: 500 TABLET, FILM COATED ORAL at 23:54

## 2020-06-16 RX ADMIN — FENTANYL CITRATE 25 MCG: 50 INJECTION, SOLUTION INTRAMUSCULAR; INTRAVENOUS at 13:57

## 2020-06-16 RX ADMIN — SODIUM CHLORIDE, POTASSIUM CHLORIDE, SODIUM LACTATE AND CALCIUM CHLORIDE: 600; 310; 30; 20 INJECTION, SOLUTION INTRAVENOUS at 10:09

## 2020-06-16 RX ADMIN — PHENYLEPHRINE HYDROCHLORIDE 80 MCG: 10 INJECTION INTRAVENOUS at 13:12

## 2020-06-16 RX ADMIN — FENTANYL CITRATE 25 MCG: 50 INJECTION, SOLUTION INTRAMUSCULAR; INTRAVENOUS at 10:24

## 2020-06-16 RX ADMIN — HYDROMORPHONE HYDROCHLORIDE 0.5 MG: 1 INJECTION, SOLUTION INTRAMUSCULAR; INTRAVENOUS; SUBCUTANEOUS at 15:45

## 2020-06-16 RX ADMIN — BUPIVACAINE HYDROCHLORIDE 30 ML: 2.5 INJECTION, SOLUTION EPIDURAL; INFILTRATION; INTRACAUDAL; PERINEURAL at 07:48

## 2020-06-16 RX ADMIN — FENTANYL CITRATE 50 MCG: 50 INJECTION, SOLUTION INTRAMUSCULAR; INTRAVENOUS at 13:56

## 2020-06-16 RX ADMIN — ROCURONIUM BROMIDE 10 MG: 10 INJECTION INTRAVENOUS at 10:23

## 2020-06-16 RX ADMIN — HYDROMORPHONE HYDROCHLORIDE 0.5 MG: 1 INJECTION, SOLUTION INTRAMUSCULAR; INTRAVENOUS; SUBCUTANEOUS at 15:17

## 2020-06-16 RX ADMIN — ONDANSETRON 4 MG: 2 INJECTION INTRAMUSCULAR; INTRAVENOUS at 13:44

## 2020-06-16 RX ADMIN — DEXAMETHASONE SODIUM PHOSPHATE 2 MG: 10 INJECTION INTRAMUSCULAR; INTRAVENOUS at 07:48

## 2020-06-16 RX ADMIN — FENTANYL CITRATE 25 MCG: 50 INJECTION, SOLUTION INTRAMUSCULAR; INTRAVENOUS at 13:03

## 2020-06-16 RX ADMIN — ROCURONIUM BROMIDE 30 MG: 10 INJECTION INTRAVENOUS at 08:33

## 2020-06-16 RX ADMIN — GLYCOPYRROLATE 0.4 MG: 0.2 INJECTION INTRAMUSCULAR; INTRAVENOUS at 13:44

## 2020-06-16 RX ADMIN — OXYCODONE 5 MG: 5 TABLET ORAL at 23:02

## 2020-06-16 NOTE — OP NOTE
DATE OF PROCEDURE: 06/16/20    PREOPERATIVE DIAGNOSIS: Bilateral absent breasts, status post bilateral mastectomies for right breast cancer.      POSTOPERATIVE DIAGNOSIS: Bilateral absent breasts, status post bilateral mastectomies for right breast cancer.      PROCEDURES PERFORMED:  1. Right breast reconstruction with latissimus dorsi flap  2. Bilateral breast reconstruction with delayed insertion of tissue expanders.    2. Use of AlloDerm in breast reconstruction on left.      SURGEON: Pablo Lockhart MD     ASSISTANT: Brittny Kingston PA-C     ANESTHESIA: General.    INDICATIONS: The patient is a 44-year-old female who was diagnosed with right-sided breast cancer. The patient was referred for breast reconstruction. We had a long discussion about delayed breast reconstruction with tissue expanders and she understood all of the risks and benefits of the procedure including infection, need for expander removal, need for future procedures and elected to proceed. She has also had radiation to her right chest and therefore needed a flap for healthy skin. She understood the risks of the latissimus flap.      FINDINGS:  1. Placement of Allergan smooth 550 cc FH expanders.   2. Placement of 150 mL of saline in the right expander today, and 250 mL of saline in the left expander today.      DESCRIPTION OF PROCEDURE: The patient was seen in preoperative holding and marked in a standing position and taken to the operating room by anesthesia after informed consent was signed and on the chart. The patient was placed on the operating room table and general anesthesia was induced. The patient underwent left pectoralis muscle nerve blocks and right serratus nerve block, and the case was then turned over to the surgical service. She was then rolled in lateral decubitus position with right side up. The patient was then prepped and draped in a normal sterile fashion. Timeout was called and all parties were in agreement including  nursing and anesthesia. Preoperative antibiotics were given. We began with the latissimus portion of the case. I began on the right chest by elevating the skin flap off the chest wall to make a pocket for the flap. This was done with bovie cautery. I then moved to her back to elevate the skin paddle with the latissimus flap. After elevating the latissimus muscle with the skin paddle off the trapezius and iliac crest it was released up to the shoulder. I then rotated the flap through the skin tunnel into the left breast pocket. The back was then closed over 2 15 Turkmen round drains. The back was then closed in layers with 2-0 Vicryl and 3-0 monocryl and a 3-0 Strattafix. Prineo was placed over the incision. She was then placed supine on the table. A smooth allergan expander was placed and sutured on top of the chest wall with its suture tabs. The muscle from the latissimus then covered the entire expander. The muscle was tacked down to the chest wall with 2-0 vicryl sutures in multiple different places. I then sutured the skin paddle to chest skin after some of the radiated skin was debrided. It was closed with 3-0 monocryl and Prineo.      I then moved to the left side by elevating up a pocket of skin off the pectoralis muscle in a prepectoral reconstruction technique. After elevating the pocket we then sewed a large sheet of AlloDerm in a prepectoral reconstructive fashion. The AlloDerm was sutured on top of the pectoralis muscle with a 2-0 Vicryl in multiple different places. The chest wall was measured to be 13 cm in width, which corresponded to a 550 cc expander. The tissue expander was opened and all of the air was removed, soaked in antibiotic irrigation and then placed on top of the pectoralis muscle, but underneath the large sheet of AlloDerm. It was then sutured into place with a 2-0 silk suture in multiple suture tab positions. Then, the expander was then insufflated to 250 mL of saline and the AlloDerm  was then tacked on top of that to complete coverage of the tissue expander with the large sheet of AlloDerm. The excess AlloDerm was then trimmed, and the remaining aspects of it were sutured down to the chest wall on top of the pectoralis muscle. The one 15-Slovenian round Julio drain was placed in the pocket. The skin was then closed with 2-0 Vicryl in Lance’s fascia, then 3-0 Monocryl in a buried deep dermal fashion, and a 3-0 Strattafix in a running subcuticular fashion.  Tthe patient was awoken in stable condition and taken to PACU.  All sponge and instrument counts were correct. I, Dr. Pablo Lockhart, was present and scrubbed for the entire procedure.      SPECIMENS: None.     ESTIMATED BLOOD LOSS: 100 cc     DRAINS: ISAK drains x 5.        COMPLICATIONS: None immediate.     Pablo Lockhart MD  06/16/20  14:08

## 2020-06-16 NOTE — ANESTHESIA PROCEDURE NOTES
R PEC I & serratus plane      Patient reassessed immediately prior to procedure    Patient location during procedure: OR  Reason for block: at surgeon's request and post-op pain management  Performed by  CRNA: Riri Wise CRNA  Preanesthetic Checklist  Completed: patient identified, site marked, surgical consent, pre-op evaluation, timeout performed, IV checked, risks and benefits discussed and monitors and equipment checked  Prep:  Pt Position: supine  Sterile barriers:cap, gloves, gown and mask  Prep: ChloraPrep  Patient monitoring: blood pressure monitoring, continuous pulse oximetry and EKG  Procedure  Performed under: general  Guidance:ultrasound guided and landmark technique  Images:still images obtained, printed/placed on chart    Laterality:right  Block Type:PECS I (serratus plane)  Injection Technique:single-shot  Needle Type:short-bevel  Needle Gauge:20 G  Resistance on Injection: none    Medications Used: buprenorphine (BUPRENEX) injection, 0.15 mg  dexamethasone sodium phosphate injection, 2 mg  bupivacaine PF (MARCAINE) 0.25 % injection, 30 mL  Med admintered at 6/16/2020 7:48 AM      Medications  Preservative Free Saline:10ml  Comment:Block Injection:  10ml injected in PEC I, 20ml injected in serratus plane        Post Assessment  Injection Assessment: negative aspiration for heme and incremental injection  Patient Tolerance:comfortable throughout block  Complications:no  Additional Notes  The pt. Was placed in the Supine Position and GA was induced     The insertion site was prepped with CHG and Ultrasound guidance with In-Plane techniquewas  a 4inch BBraun 360 degree echogenic needle was visualized.  Normal Saline PSF was  utilized for hydrodissection of tissue. PECS 1 Block- Pectoralis Major and Minor where identified and LA was injected between PMM and PmM at the level of the 3rd Rib(10ml),  PECS 2-  Pectoralis Minor and Serratus muscle where identified and the needle was advanced laterally  in-plane with the 4th rib as a backstop, pleura was monitored.  LA was injected between SA and PmM at the level of 4th rib( 20ml).  LA injection spread was visualized, injection was incremental 1-5ml, normal or low injection pressure, no intravascular injection, no pneumothorax appreciated.  Thank You.

## 2020-06-16 NOTE — PLAN OF CARE
Problem: Patient Care Overview  Goal: Plan of Care Review  Outcome: Ongoing (interventions implemented as appropriate)  Flowsheets (Taken 6/16/2020 1840)  Progress: improving  Plan of Care Reviewed With: patient; spouse  Note:   Pt received from PACU today. She required one prn po pain med and one prn IV pain med today. She is eating crackers and drinking fluids without difficulty. No nausea. Incision sites without drainage or redness. ISAK drains in place. Pt due to void as Samson was removed upon arrival to floor.

## 2020-06-16 NOTE — ANESTHESIA PREPROCEDURE EVALUATION
Anesthesia Evaluation     Patient summary reviewed and Nursing notes reviewed   NPO Solid Status: > 8 hours  NPO Liquid Status: > 2 hours           Airway   Mallampati: II  TM distance: >3 FB  Neck ROM: full  No difficulty expected  Dental      Pulmonary    (+) shortness of breath,   (-) asthma  Cardiovascular     ECG reviewed  Patient on routine beta blocker    (+) hypertension,   (-) past MI, CAD, dysrhythmias, angina    ROS comment: EC\G NSR   ECHO Patient has a history of breast cancer and treatment with Adriamycin.  Left ventricular systolic function is low normal. Estimated EF = 45%. There is global hypokinesis.  Reduced from 61% before chemo    Neuro/Psych  (+) headaches, dizziness/light headedness, psychiatric history (xanax cymbalta ),     GI/Hepatic/Renal/Endo    (+) obesity,  GERD,    (-) morbid obesity    Musculoskeletal     Abdominal    Substance History      OB/GYN          Other   arthritis,    history of cancer (r breast )        Phys Exam Other: Easy view intubation Mac 3 grade 1               Anesthesia Plan    ASA 3     general with block   (Propofol Infusion as part of Anti PONV tech )  intravenous induction     Anesthetic plan, all risks, benefits, and alternatives have been provided, discussed and informed consent has been obtained with: patient.    Plan discussed with CRNA.

## 2020-06-16 NOTE — ANESTHESIA PROCEDURE NOTES
L PECs I & II      Patient reassessed immediately prior to procedure    Patient location during procedure: OR  Reason for block: at surgeon's request and post-op pain management  Performed by  CRNA: Riri Wise CRNA  Preanesthetic Checklist  Completed: patient identified, site marked, surgical consent, pre-op evaluation, timeout performed, IV checked, risks and benefits discussed and monitors and equipment checked  Prep:  Pt Position: supine  Sterile barriers:cap, gloves, gown and mask  Prep: ChloraPrep  Patient monitoring: blood pressure monitoring, continuous pulse oximetry and EKG  Procedure  Performed under: general  Guidance:ultrasound guided and landmark technique  Images:still images obtained, printed/placed on chart    Laterality:left  Block Type:PECS I and PECS II  Injection Technique:single-shot  Needle Type:short-bevel  Needle Gauge:20 G  Resistance on Injection: none    Medications Used: buprenorphine (BUPRENEX) injection, 0.15 mg  dexamethasone sodium phosphate injection, 2 mg  bupivacaine PF (MARCAINE) 0.25 % injection, 30 mL  Med admintered at 6/16/2020 7:52 AM      Medications  Preservative Free Saline:10ml  Comment:Block Injection:  10ml injected into PEC I, 20ml injected into PEC II    Post Assessment  Injection Assessment: negative aspiration for heme and incremental injection  Patient Tolerance:comfortable throughout block  Complications:no  Additional Notes  The pt. Was placed in the Supine Position and GA was induced     The insertion site was prepped with CHG and Ultrasound guidance with In-Plane techniquewas  a 4inch BBraun 360 degree echogenic needle was visualized.  Normal Saline PSF was  utilized for hydrodissection of tissue. PECS 1 Block- Pectoralis Major and Minor where identified and LA was injected between PMM and PmM at the level of the 3rd Rib(10ml),  PECS 2-  Pectoralis Minor and Serratus muscle where identified and the needle was advanced laterally in-plane with the 4th rib as  a backstop, pleura was monitored.  LA was injected between SA and PmM at the level of 4th rib( 20ml).  LA injection spread was visualized, injection was incremental 1-5ml, normal or low injection pressure, no intravascular injection, no pneumothorax appreciated.  Thank You.

## 2020-06-16 NOTE — BRIEF OP NOTE
BREAST RECONSTRUCTION WITH LATISSIMUS MUSCLE FLAP AND IMPLANT INSERTION  Progress Note    Estefany Elenatox  6/16/2020    Pre-op Diagnosis:   Bilateral absent breasts after mastectomies for breast cancer and radiation to right breast.       Post-Op Diagnosis Codes:   same    Procedure/CPT® Codes:      Procedure(s):  RIGHT BREAST LATISSIMUS DORSI FLAP. DELAYED LEFT BREAST RECONSTRUCTION WITH EXPANDER    Surgeon(s):  Pablo Lockhart MD    Anesthesia: General    Staff:   Circulator: Debbie Mello RN; Gely Nixon RN; Jnen Gentile RN  Scrub Person: Paul Temple; José Miguel Toure RN  Assistant: Tasha Kingston PA    Estimated Blood Loss: 100ml    Urine Voided: 460 mL    Specimens:                Specimens     ID Source Type Tests Collected By Collected At Frozen?      A Breast, Right Tissue · TISSUE PATHOLOGY EXAM   Pablo Lockhart MD 6/16/20 1154      Description: Right Breast Skin     This specimen was not marked as sent.                Drains:   Y Closed/Suction Drain 3 and 4 Right Breast Bulb (Active)       Closed/Suction Drain 1 Right Back Bulb 15 Fr. (Active)       Closed/Suction Drain 2 Right Back Bulb 15 Fr. (Active)       Urethral Catheter Straight-tip;Silicone 16 Fr. (Active)       [REMOVED] Closed/Suction Drain 1 Right Breast Bulb (Removed)       [REMOVED] Closed/Suction Drain 2 Left Breast Bulb (Removed)     JPx5    Findings: Allergan smooth 550 cc expanders with 150 cc on right and 250 cc on left of saline.     Complications: none immediate      Pablo Lockhart MD     Date: 6/16/2020  Time: 14:06

## 2020-06-16 NOTE — H&P
Pre-Op H&P  Estefany Bryan  [id number]  1975    Chief complaint: History of breast cancer    HPI:  Patient is a 44 y.o.female who presents with an acquired absence of breast and absent nipple, bilateral.  She has a history of a malignant neoplasm of right female breast, ER-, status post bilateral skin sparing mastectomy on 06/21/18 with Dr. Canela. She underwent radiation to the right chest wall.    She presents today for a right breast latissimus dorsi flap, delayed breast reconstruction with tissue expander insertion, bilateral    Review of Systems:  General ROS: negative for chills, fever or skin lesions;  No changes since last office visit.  Neg for recent sick exposure  Cardiovascular ROS: no chest pain or dyspnea on exertion  Respiratory ROS: no cough, shortness of breath, or wheezing    Allergies:   Allergies   Allergen Reactions   • Chlorhexidine Other (See Comments)     Pt. developed redness and burning sensation after using.   • Codeine GI Intolerance     If take with nexium pt is okay  or if she can have zofran with       Home Meds:    No current facility-administered medications on file prior to encounter.      Current Outpatient Medications on File Prior to Encounter   Medication Sig Dispense Refill   • ALPRAZolam (XANAX) 1 MG tablet Take 1 mg by mouth 3 (Three) Times a Day.  2   • DULoxetine (CYMBALTA) 30 MG capsule Take 30 mg by mouth Daily.  6   • polyethyl glycol-propyl glycol (SYSTANE) 0.4-0.3 % solution ophthalmic solution Administer 1 drop to both eyes As Needed (for itchy eyes).         PMH:   Past Medical History:   Diagnosis Date   • Anxiety    • Arthritis     IN SI JOINT   • Breast cancer, right (CMS/HCC) 1/10/2018   • Depression    • Dizzy    • GERD (gastroesophageal reflux disease) 01/23/2018    chemo related    • H/O Bell's palsy     oct 25 2015 or 2016   • High blood pressure    • History of chemotherapy     last therapy5/2018   • History of kidney stones    • History of  radiation therapy    • Itchy eyes     bilat - from bells palsy    • Low back pain     DUE TO ARTHRITIS   • Migraine     and tunnel vision    • Migraine     HAS NOT HAD A MIGRAINE IN A YEAR   • Muscle ache     all over    • SOBOE (shortness of breath on exertion)    • Tachycardia    • Tattoos     BACK AND LEFT HIP   • Wears glasses      PSH:    Past Surgical History:   Procedure Laterality Date   • BREAST BIOPSY Bilateral     and a lymphnode on the right. also skin punch biopsies   • COLONOSCOPY  2015    x2   • DILATATION AND CURETTAGE      several with several miscarriages    • ENDOMETRIAL ABLATION     • ENDOSCOPY     • MASTECTOMY WITH SENTINEL NODE BIOPSY AND AXILLARY NODE DISSECTION Right 2018    Procedure: RIGHT SKIN SPARING BREAST MASTECTOMY WITH RIGHT SENTINEL NODE BIOPSY, PROPHYLATIC SKIN SPARING MASTECTOMY, AND REMOVAL OF PORT;  Surgeon: Sandeep Canela MD;  Location:  [facility] OR;  Service: General bilateral mastectomy   • PORTACATH PLACEMENT     • TONSILLECTOMY      unsure about anoids -     • TOTAL LAPAROSCOPIC HYSTERECTOMY N/A 2019    Procedure: TOTAL ROBOTIC HYSTERECTOMY WITH BILATERAL SALPINGO-OOPHORECTOMY, EXCISION IOF VULVAR SKIN TAG;  Surgeon: Li Faye MD;  Location:  [facility] OR;  Service: DaVinci   • VENOUS ACCESS DEVICE (PORT) REMOVAL     • WISDOM TOOTH EXTRACTION      all -          Social History:   Tobacco:   Social History     Tobacco Use   Smoking Status Former Smoker   • Years: 20.00   • Types: Electronic Cigarette, Cigarettes   • Last attempt to quit: 4/15/2020   • Years since quittin.1   Smokeless Tobacco Never Used   Tobacco Comment    vape- 24 mg 5 years been vaper    quit smoking cig about 5 years ago or so         Alcohol:     Social History     Substance and Sexual Activity   Alcohol Use No       Vitals:           /96 (BP Location: Left arm, Patient Position: Lying)   Pulse 90   Temp 97.5 °F (36.4 °C) (Tympanic)   Resp 18   LMP 2017    SpO2 96%   Breastfeeding No     Physical Exam:  General Appearance:    Alert, cooperative, no distress, appears stated age   Head:    Normocephalic, without obvious abnormality, atraumatic   Lungs:     Clear to auscultation bilaterally, respirations unlabored    Heart:   Regular rate and rhythm, S1 and S2 normal, no murmur, rub    or gallop    Abdomen:    Soft, nontender.  +bowel sounds   Breast Exam:    deferred   Genitalia:    deferred   Extremities:   Extremities normal, atraumatic, no cyanosis or edema   Skin:   Skin color, texture, turgor normal, no rashes or lesions   Neurologic:   Grossly intact   Results Review  LABS:  Lab Results   Component Value Date    WBC 4.52 06/14/2020    HGB 12.9 06/14/2020    HCT 38.9 06/14/2020    MCV 92.6 06/14/2020     06/14/2020    NEUTROABS 2.40 03/12/2020    GLUCOSE 110 (H) 06/14/2020    BUN 14 06/14/2020    CREATININE 0.84 06/14/2020    EGFRIFNONA 74 06/14/2020     06/14/2020    K 4.6 06/14/2020     06/14/2020    CO2 25.0 06/14/2020    CALCIUM 9.6 06/14/2020    ALBUMIN 4.30 03/12/2020    AST 26 03/12/2020    ALT 35 (H) 03/12/2020    BILITOT 0.2 03/12/2020       RADIOLOGY:  Imaging Results (Last 72 Hours)     ** No results found for the last 72 hours. **          I reviewed the patient's new clinical results.    Cancer Staging (if applicable)  Cancer Patient: _x_ yes __no __unknown; If yes, clinical stage T:__ N:__M:__, stage group or __N/A    Impression:   Acquired absence of breast and absent nipple, bilateral  History of breast cancer    Plan:   Right breast latissimus dorsi flap with tissue expander insertion; delayed breast reconstruction with tissue expander insertion, left.    Payton Rivers PA-C   6/16/2020   06:42

## 2020-06-16 NOTE — ANESTHESIA PROCEDURE NOTES
Airway  Urgency: elective    Date/Time: 6/16/2020 7:49 AM  Airway not difficult    General Information and Staff    Patient location during procedure: OR  CRNA: Aruna White CRNA    Indications and Patient Condition  Indications for airway management: airway protection    Preoxygenated: yes  MILS not maintained throughout  Mask difficulty assessment: 2 - vent by mask + OA or adjuvant +/- NMBA    Final Airway Details  Final airway type: endotracheal airway      Successful airway: ETT  Cuffed: yes   Successful intubation technique: direct laryngoscopy  Facilitating devices/methods: intubating stylet and cricoid pressure  Endotracheal tube insertion site: oral  Blade: Maurice  Blade size: 3  ETT size (mm): 7.0  Cormack-Lehane Classification: grade I - full view of glottis  Placement verified by: chest auscultation and capnometry   Measured from: lips  ETT/EBT  to lips (cm): 21  Number of attempts at approach: 1  Assessment: lips, teeth, and gum same as pre-op and atraumatic intubation    Additional Comments  Negative epigastric sounds, Breath sound equal bilaterally with symmetric chest rise and fall

## 2020-06-16 NOTE — ANESTHESIA POSTPROCEDURE EVALUATION
Patient: Estefany Elenatox    Procedure Summary     Date:  06/16/20 Room / [facility]    Anesthesia Start:  0738 Anesthesia Stop:  1432    Procedure:  RIGHT BREAST LATISSIMUS DORSI FLAP WITH TISSUE EXPANDER, DELAYED LEFT BREAST RECONSTRUCTION WITH TISSUE EXPANDER AND ALLODERM (Bilateral Breast) Diagnosis:      Surgeon:  Pablo Lockhart MD Provider:  Jori Porter MD    Anesthesia Type:  general ASA Status:  3          Anesthesia Type: general    Vitals  Vitals Value Taken Time   /71 6/16/2020  2:40 PM   Temp 97.1 °F (36.2 °C) 6/16/2020  2:29 PM   Pulse 109 6/16/2020  2:45 PM   Resp 16 6/16/2020  2:40 PM   SpO2 100 % 6/16/2020  2:45 PM   Vitals shown include unvalidated device data.          Post Anesthesia Care and Evaluation    Patient location during evaluation: PACU  Patient participation: complete - patient participated  Level of consciousness: awake and alert  Pain score: 0  Pain management: adequate  Airway patency: patent  Anesthetic complications: No anesthetic complications  PONV Status: none  Cardiovascular status: hemodynamically stable and acceptable  Respiratory status: nonlabored ventilation, acceptable and room air  Hydration status: acceptable

## 2020-06-17 VITALS
RESPIRATION RATE: 14 BRPM | DIASTOLIC BLOOD PRESSURE: 70 MMHG | HEART RATE: 73 BPM | TEMPERATURE: 97.9 F | SYSTOLIC BLOOD PRESSURE: 114 MMHG | OXYGEN SATURATION: 97 %

## 2020-06-17 PROCEDURE — 25010000003 CEFAZOLIN IN DEXTROSE 2-4 GM/100ML-% SOLUTION: Performed by: PLASTIC SURGERY

## 2020-06-17 PROCEDURE — 25010000002 MORPHINE PER 10 MG: Performed by: PLASTIC SURGERY

## 2020-06-17 PROCEDURE — G0378 HOSPITAL OBSERVATION PER HR: HCPCS

## 2020-06-17 RX ORDER — CEPHALEXIN 500 MG/1
500 CAPSULE ORAL 3 TIMES DAILY
Qty: 15 CAPSULE | Refills: 0 | Status: SHIPPED | OUTPATIENT
Start: 2020-06-17 | End: 2020-06-22

## 2020-06-17 RX ORDER — ONDANSETRON 4 MG/1
4 TABLET, FILM COATED ORAL EVERY 8 HOURS PRN
Qty: 20 TABLET | Refills: 1 | Status: SHIPPED | OUTPATIENT
Start: 2020-06-17 | End: 2020-09-23

## 2020-06-17 RX ORDER — OXYCODONE HYDROCHLORIDE 5 MG/1
5-10 TABLET ORAL EVERY 4 HOURS PRN
Qty: 40 TABLET | Refills: 0 | Status: SHIPPED | OUTPATIENT
Start: 2020-06-17 | End: 2020-07-16

## 2020-06-17 RX ADMIN — METOPROLOL SUCCINATE 50 MG: 50 TABLET, EXTENDED RELEASE ORAL at 08:00

## 2020-06-17 RX ADMIN — OXYCODONE 5 MG: 5 TABLET ORAL at 08:00

## 2020-06-17 RX ADMIN — FAMOTIDINE 20 MG: 20 TABLET, FILM COATED ORAL at 08:00

## 2020-06-17 RX ADMIN — DULOXETINE HYDROCHLORIDE 30 MG: 30 CAPSULE, DELAYED RELEASE ORAL at 08:00

## 2020-06-17 RX ADMIN — ACETAMINOPHEN 1000 MG: 500 TABLET, FILM COATED ORAL at 05:47

## 2020-06-17 RX ADMIN — MORPHINE SULFATE 2 MG: 2 INJECTION, SOLUTION INTRAMUSCULAR; INTRAVENOUS at 04:20

## 2020-06-17 RX ADMIN — ALPRAZOLAM 1 MG: 1 TABLET ORAL at 08:00

## 2020-06-17 RX ADMIN — CEFAZOLIN SODIUM 2 G: 2 INJECTION, SOLUTION INTRAVENOUS at 04:16

## 2020-06-17 NOTE — PROGRESS NOTES
LOS: 0 days   Patient Care Team:  Henry Miles MD as PCP - General  Juan AntonioWinsome calix MD as Consulting Physician (Hematology and Oncology)  Sandeep Canela MD as Consulting Physician (General Surgery)  Aliza Levin MD as Consulting Physician (Radiation Oncology)    Chief Complaint:  Doing well. Pain controlled.     Subjective     Interval History:     Patient Complaints: none  Patient Denies:  Significant pain  History taken from: patient        Objective     Vital Signs  Temp:  [96.7 °F (35.9 °C)-98.5 °F (36.9 °C)] 98.3 °F (36.8 °C)  Heart Rate:  [] 97  Resp:  [16-18] 18  BP: (111-139)/(71-96) 130/74    Physical Exam:  GEN: NAD, AAOx3   Breast: skin flaps pink, viable. No hematomas. Incisions c/d/i. ISAK drains in place with serosanginous drainage. Flap pink on right, soft.   Back: incision c/d/i. No hematomas. ISAK drains serosanginous       Results Review:     I reviewed the patient's new clinical results.  Lab Results   Component Value Date    WBC 4.52 06/14/2020    HGB 12.9 06/14/2020    HCT 38.9 06/14/2020    MCV 92.6 06/14/2020     06/14/2020     Lab Results   Component Value Date    GLUCOSE 110 (H) 06/14/2020    CALCIUM 9.6 06/14/2020     06/14/2020    K 4.6 06/14/2020    CO2 25.0 06/14/2020     06/14/2020    BUN 14 06/14/2020    CREATININE 0.84 06/14/2020    EGFRIFNONA 74 06/14/2020    BCR 16.7 06/14/2020    ANIONGAP 12.0 06/14/2020   No results found for: PTTNo results found for: INR, PROTIME      Assessment/Plan       Breast cancer (CMS/HCC)      1. D/c home  2. F/u on Monday in office  3. Leave dressing on until tomorrow or Friday. Can then take off and shower  4. Drain care  5. Scripts on chart  6. No lifting    Plan for disposition: home    Pablo Lockhart MD  06/17/20  06:22

## 2020-06-17 NOTE — PLAN OF CARE
Problem: Patient Care Overview  Goal: Plan of Care Review  Flowsheets  Taken 6/16/2020 1840 by Lorri Mccloud RN  Progress: improving  Taken 6/17/2020 0512 by Sandra Katz LPN  Plan of Care Reviewed With: patient  Note:   Patient c omplains of more pain in right axilla area than other areas, voiding well per brp.was very anxious earlier but more calm now. Noted red rash on upper chest. To continue to monitor.

## 2020-07-16 ENCOUNTER — TELEPHONE (OUTPATIENT)
Dept: ONCOLOGY | Facility: CLINIC | Age: 45
End: 2020-07-16

## 2020-07-16 ENCOUNTER — LAB (OUTPATIENT)
Dept: LAB | Facility: HOSPITAL | Age: 45
End: 2020-07-16

## 2020-07-16 ENCOUNTER — OFFICE VISIT (OUTPATIENT)
Dept: ONCOLOGY | Facility: CLINIC | Age: 45
End: 2020-07-16

## 2020-07-16 VITALS
SYSTOLIC BLOOD PRESSURE: 129 MMHG | BODY MASS INDEX: 32.43 KG/M2 | OXYGEN SATURATION: 98 % | WEIGHT: 214 LBS | HEIGHT: 68 IN | TEMPERATURE: 97.8 F | HEART RATE: 99 BPM | RESPIRATION RATE: 16 BRPM | DIASTOLIC BLOOD PRESSURE: 97 MMHG

## 2020-07-16 DIAGNOSIS — Z17.1 MALIGNANT NEOPLASM OF AREOLA OF RIGHT BREAST IN FEMALE, ESTROGEN RECEPTOR NEGATIVE (HCC): Primary | ICD-10-CM

## 2020-07-16 DIAGNOSIS — Z17.1 MALIGNANT NEOPLASM OF RIGHT BREAST IN FEMALE, ESTROGEN RECEPTOR NEGATIVE, UNSPECIFIED SITE OF BREAST (HCC): ICD-10-CM

## 2020-07-16 DIAGNOSIS — C50.011 MALIGNANT NEOPLASM OF AREOLA OF RIGHT BREAST IN FEMALE, ESTROGEN RECEPTOR NEGATIVE (HCC): Primary | ICD-10-CM

## 2020-07-16 DIAGNOSIS — C50.911 MALIGNANT NEOPLASM OF RIGHT BREAST IN FEMALE, ESTROGEN RECEPTOR NEGATIVE, UNSPECIFIED SITE OF BREAST (HCC): ICD-10-CM

## 2020-07-16 LAB
ALBUMIN SERPL-MCNC: 4.1 G/DL (ref 3.5–5.2)
ALBUMIN/GLOB SERPL: 1.4 G/DL
ALP SERPL-CCNC: 147 U/L (ref 39–117)
ALT SERPL W P-5'-P-CCNC: 36 U/L (ref 1–33)
ANION GAP SERPL CALCULATED.3IONS-SCNC: 11 MMOL/L (ref 5–15)
AST SERPL-CCNC: 27 U/L (ref 1–32)
BILIRUB SERPL-MCNC: 0.2 MG/DL (ref 0–1.2)
BUN SERPL-MCNC: 14 MG/DL (ref 6–20)
BUN/CREAT SERPL: 16.1 (ref 7–25)
CALCIUM SPEC-SCNC: 9.7 MG/DL (ref 8.6–10.5)
CHLORIDE SERPL-SCNC: 102 MMOL/L (ref 98–107)
CO2 SERPL-SCNC: 26 MMOL/L (ref 22–29)
CREAT SERPL-MCNC: 0.87 MG/DL (ref 0.57–1)
ERYTHROCYTE [DISTWIDTH] IN BLOOD BY AUTOMATED COUNT: 13.5 % (ref 12.3–15.4)
GFR SERPL CREATININE-BSD FRML MDRD: 71 ML/MIN/1.73
GLOBULIN UR ELPH-MCNC: 2.9 GM/DL
GLUCOSE SERPL-MCNC: 118 MG/DL (ref 65–99)
HCT VFR BLD AUTO: 40.8 % (ref 34–46.6)
HGB BLD-MCNC: 13.1 G/DL (ref 12–15.9)
LYMPHOCYTES # BLD AUTO: 2 10*3/MM3 (ref 0.7–3.1)
LYMPHOCYTES NFR BLD AUTO: 30.6 % (ref 19.6–45.3)
MCH RBC QN AUTO: 29.2 PG (ref 26.6–33)
MCHC RBC AUTO-ENTMCNC: 32.2 G/DL (ref 31.5–35.7)
MCV RBC AUTO: 90.7 FL (ref 79–97)
MONOCYTES # BLD AUTO: 0.7 10*3/MM3 (ref 0.1–0.9)
MONOCYTES NFR BLD AUTO: 10 % (ref 5–12)
NEUTROPHILS NFR BLD AUTO: 3.9 10*3/MM3 (ref 1.7–7)
NEUTROPHILS NFR BLD AUTO: 59.4 % (ref 42.7–76)
PLATELET # BLD AUTO: 311 10*3/MM3 (ref 140–450)
PMV BLD AUTO: 8.1 FL (ref 6–12)
POTASSIUM SERPL-SCNC: 4.5 MMOL/L (ref 3.5–5.2)
PROT SERPL-MCNC: 7 G/DL (ref 6–8.5)
RBC # BLD AUTO: 4.5 10*6/MM3 (ref 3.77–5.28)
SODIUM SERPL-SCNC: 139 MMOL/L (ref 136–145)
WBC # BLD AUTO: 6.6 10*3/MM3 (ref 3.4–10.8)

## 2020-07-16 PROCEDURE — 80053 COMPREHEN METABOLIC PANEL: CPT

## 2020-07-16 PROCEDURE — 85025 COMPLETE CBC W/AUTO DIFF WBC: CPT

## 2020-07-16 PROCEDURE — 99213 OFFICE O/P EST LOW 20 MIN: CPT | Performed by: INTERNAL MEDICINE

## 2020-07-16 PROCEDURE — 36415 COLL VENOUS BLD VENIPUNCTURE: CPT

## 2020-07-16 RX ORDER — OXYCODONE HYDROCHLORIDE AND ACETAMINOPHEN 5; 325 MG/1; MG/1
TABLET ORAL
COMMUNITY
Start: 2020-06-22 | End: 2020-09-23

## 2020-07-16 NOTE — TELEPHONE ENCOUNTER
Called patient per Dr Romano to tell her that a couple of her liver enzymes were slightly elevated.  Dr Romano wants these repeated in a month.  Patient did say she had been taking tylenol for a recent surgery and I instructed to refrain from using tylenol or drinking alcohol a few days prior to lab draw.  Patient verbalized understanding.

## 2020-07-16 NOTE — PROGRESS NOTES
PROBLEM LIST:  1. [id number] (stage IIIC) triple negative IDC of the right breast  A) patient presented with a enlarging mass in the right breast for 5-6 months associated with shooting pain.  Biopsy showed high grade invasive ductal carcinoma, ER negative, KS negative, HER-2 negative.  The mass on imaging measures approximately 9.7 cm.  Enlarged abnormal lymph nodes are present on imaging.  FNA of 1 axillary lymph node was negative for malignancy.  PET/CT on 1/12/18 showed large necrotic right breast mass, 2 pathologically enlarged and hypermetabolic intramammary nodes, and no evidence of distant disease.  Breast MRI on 1/17/18 showed evidence of pectoral muscle involvement, as well as several satellite lesions.  B) neoadjuvant ddAC followed by weekly taxol started on 1/23/18.  Taxol stopped after 10 doses due to persistent tachycardia  C) bilateral mastectomy on 6/21/18.  Residual 5 mm high grade IDC in the right breast.  0/4 LN involved.  [id number]  D) adjuvant radiation completed 8/27/18.  2.  Hypertension  3.  Anxiety  4.  Bell's palsy  5. Sinus tachycardia    Subjective      CC: breast cancer    HISTORY OF PRESENT ILLNESS:   Estefany Bryan returns today for follow-up.  She says she is feeling well.  She is currently undergoing breast reconstruction.  She had a latissimus flap on the right and has expanders in place currently.  So far she has been pleased with the process.  In general she feels good and has no complaints today.    Past Medical History, Past Surgical History, Social History, Family History have been reviewed and are without significant changes except as mentioned.    Review of Systems   A comprehensive 14 point review of systems was performed and was negative except as mentioned.    Medications:  The current medication list was reviewed in the EMR    ALLERGIES:    Allergies   Allergen Reactions   • Chlorhexidine Other (See Comments)     Pt. developed redness and burning sensation after  using.   • Codeine GI Intolerance     If take with nexium pt is okay  or if she can have zofran with       Objective      LMP 11/29/2017      Performance Status: 0    General: well appearing female in no acute distress  Neuro: alert and oriented  HEENT: sclera anicteric, oropharynx clear  Lymphatics: no cervical, supraclavicular adenopathy. No palpable axillary adenopathy  Chest: Bilateral breast reconstruction with tissue expanders in place.  Cardiovascular: regular rate and rhythm, no murmurs  Lungs: clear to auscultation bilaterally  Abdomen: soft, nontender, nondistended.  No palpable organomegaly  Extremeties: no lower extremity edema  Skin: no rashes, lesions, bruising, or petechiae  Psych: mood and affect appropriate    Labs:  Lab Results   Component Value Date    WBC 4.52 06/14/2020    HGB 12.9 06/14/2020    HCT 38.9 06/14/2020    MCV 92.6 06/14/2020     06/14/2020     Lab Results   Component Value Date    GLUCOSE 110 (H) 06/14/2020    BUN 14 06/14/2020    CREATININE 0.84 06/14/2020    EGFRIFNONA 74 06/14/2020    BCR 16.7 06/14/2020    K 4.6 06/14/2020    CO2 25.0 06/14/2020    CALCIUM 9.6 06/14/2020    ALBUMIN 4.30 03/12/2020    LABIL2 1.4 08/01/2015    AST 26 03/12/2020    ALT 35 (H) 03/12/2020       Assessment/Plan   Estefany Bryan is a 44 y.o. year old female with [id number] triple negative breast cancer who returns for follow up.   She is doing well with no evidence of disease recurrence.  She is currently going through reconstruction and is feeling happy about what feels like an end to her breast cancer process.    She is now 2 years out from her completion of treatment.  We will space out visits to every 6 months.              I spent 15 minutes with the patient. I spent > 50% percent of this time counseling and discussing prognosis, diagnostic testing, evaluation, current status and management.    Winsome Romano MD  [facility] Hematology and Oncology    7/16/2020          CC:

## 2020-07-16 NOTE — TELEPHONE ENCOUNTER
----- Message from Winsome Romano MD sent at 7/16/2020 11:54 AM EDT -----  Regarding: labs  Please let her know she has very mild elevation of 2 of her liver enzymes.  i'd like her to recheck these in 1 month.  Avoid tylenol or alcohol for a few days prior to lab draw.      Thx.      ----- Message -----  From: Lab, Background User  Sent: 7/16/2020   9:48 AM EDT  To: Winsome Romano MD

## 2020-09-23 ENCOUNTER — APPOINTMENT (OUTPATIENT)
Dept: PREADMISSION TESTING | Facility: HOSPITAL | Age: 45
End: 2020-09-23

## 2020-09-23 VITALS — HEIGHT: 68 IN | BODY MASS INDEX: 33.34 KG/M2 | WEIGHT: 220 LBS

## 2020-09-23 LAB
ANION GAP SERPL CALCULATED.3IONS-SCNC: 9 MMOL/L (ref 5–15)
BUN SERPL-MCNC: 13 MG/DL (ref 6–20)
BUN/CREAT SERPL: 14.4 (ref 7–25)
CALCIUM SPEC-SCNC: 9.7 MG/DL (ref 8.6–10.5)
CHLORIDE SERPL-SCNC: 102 MMOL/L (ref 98–107)
CO2 SERPL-SCNC: 27 MMOL/L (ref 22–29)
CREAT SERPL-MCNC: 0.9 MG/DL (ref 0.57–1)
DEPRECATED RDW RBC AUTO: 43.3 FL (ref 37–54)
ERYTHROCYTE [DISTWIDTH] IN BLOOD BY AUTOMATED COUNT: 13.2 % (ref 12.3–15.4)
GFR SERPL CREATININE-BSD FRML MDRD: 68 ML/MIN/1.73
GLUCOSE SERPL-MCNC: 103 MG/DL (ref 65–99)
HBA1C MFR BLD: 5.6 % (ref 4.8–5.6)
HCT VFR BLD AUTO: 40.1 % (ref 34–46.6)
HGB BLD-MCNC: 13.1 G/DL (ref 12–15.9)
MCH RBC QN AUTO: 29.4 PG (ref 26.6–33)
MCHC RBC AUTO-ENTMCNC: 32.7 G/DL (ref 31.5–35.7)
MCV RBC AUTO: 90.1 FL (ref 79–97)
PLATELET # BLD AUTO: 333 10*3/MM3 (ref 140–450)
PMV BLD AUTO: 10.3 FL (ref 6–12)
POTASSIUM SERPL-SCNC: 4 MMOL/L (ref 3.5–5.2)
RBC # BLD AUTO: 4.45 10*6/MM3 (ref 3.77–5.28)
SODIUM SERPL-SCNC: 138 MMOL/L (ref 136–145)
WBC # BLD AUTO: 5.82 10*3/MM3 (ref 3.4–10.8)

## 2020-09-23 PROCEDURE — U0004 COV-19 TEST NON-CDC HGH THRU: HCPCS

## 2020-09-23 PROCEDURE — 85027 COMPLETE CBC AUTOMATED: CPT | Performed by: PLASTIC SURGERY

## 2020-09-23 PROCEDURE — C9803 HOPD COVID-19 SPEC COLLECT: HCPCS

## 2020-09-23 PROCEDURE — 80048 BASIC METABOLIC PNL TOTAL CA: CPT | Performed by: PLASTIC SURGERY

## 2020-09-23 PROCEDURE — 83036 HEMOGLOBIN GLYCOSYLATED A1C: CPT | Performed by: PLASTIC SURGERY

## 2020-09-23 PROCEDURE — 36415 COLL VENOUS BLD VENIPUNCTURE: CPT

## 2020-09-23 RX ORDER — VITAMIN B COMPLEX
CAPSULE ORAL DAILY
COMMUNITY
End: 2021-02-04

## 2020-09-23 RX ORDER — OMEPRAZOLE 20 MG/1
20 CAPSULE, DELAYED RELEASE ORAL DAILY
COMMUNITY

## 2020-09-23 RX ORDER — METOPROLOL SUCCINATE 50 MG/1
50 TABLET, EXTENDED RELEASE ORAL DAILY
COMMUNITY
End: 2022-08-18

## 2020-09-23 RX ORDER — IBUPROFEN 800 MG
TABLET ORAL EVERY MORNING
COMMUNITY

## 2020-09-23 NOTE — PAT
EKG 6/14/20    Bath Luxe given to patient to use since allergic to Chlorhexidine wipes.    Patient instructed to drink 20 ounces (or until full) of Gatorade and it needs to be completed 1 hour before given arrival time for procedure (NO RED Gatorade)    Patient verbalized understanding.

## 2020-09-24 ENCOUNTER — ANESTHESIA EVENT (OUTPATIENT)
Dept: PERIOP | Facility: HOSPITAL | Age: 45
End: 2020-09-24

## 2020-09-24 LAB — SARS-COV-2 RNA NOSE QL NAA+PROBE: NOT DETECTED

## 2020-09-24 RX ORDER — SODIUM CHLORIDE 0.9 % (FLUSH) 0.9 %
10 SYRINGE (ML) INJECTION EVERY 12 HOURS SCHEDULED
Status: CANCELLED | OUTPATIENT
Start: 2020-09-24

## 2020-09-24 RX ORDER — FAMOTIDINE 10 MG/ML
20 INJECTION, SOLUTION INTRAVENOUS ONCE
Status: CANCELLED | OUTPATIENT
Start: 2020-09-24 | End: 2020-09-24

## 2020-09-24 RX ORDER — SODIUM CHLORIDE 0.9 % (FLUSH) 0.9 %
10 SYRINGE (ML) INJECTION AS NEEDED
Status: CANCELLED | OUTPATIENT
Start: 2020-09-24

## 2020-09-25 ENCOUNTER — ANESTHESIA (OUTPATIENT)
Dept: PERIOP | Facility: HOSPITAL | Age: 45
End: 2020-09-25

## 2020-09-25 ENCOUNTER — HOSPITAL ENCOUNTER (OUTPATIENT)
Facility: HOSPITAL | Age: 45
Setting detail: SURGERY ADMIT
Discharge: HOME | End: 2020-09-25
Attending: PLASTIC SURGERY | Admitting: PLASTIC SURGERY

## 2020-09-25 VITALS
HEART RATE: 91 BPM | OXYGEN SATURATION: 96 % | TEMPERATURE: 97.6 F | SYSTOLIC BLOOD PRESSURE: 139 MMHG | RESPIRATION RATE: 16 BRPM | DIASTOLIC BLOOD PRESSURE: 95 MMHG

## 2020-09-25 DIAGNOSIS — Z17.1 MALIGNANT NEOPLASM OF RIGHT BREAST IN FEMALE, ESTROGEN RECEPTOR NEGATIVE, UNSPECIFIED SITE OF BREAST (HCC): Primary | ICD-10-CM

## 2020-09-25 DIAGNOSIS — C50.911 MALIGNANT NEOPLASM OF RIGHT BREAST IN FEMALE, ESTROGEN RECEPTOR NEGATIVE, UNSPECIFIED SITE OF BREAST (HCC): Primary | ICD-10-CM

## 2020-09-25 PROCEDURE — C1789 PROSTHESIS, BREAST, IMP: HCPCS | Performed by: PLASTIC SURGERY

## 2020-09-25 PROCEDURE — 25010000002 PHENYLEPHRINE PER 1 ML: Performed by: NURSE ANESTHETIST, CERTIFIED REGISTERED

## 2020-09-25 PROCEDURE — 25010000002 FENTANYL CITRATE (PF) 100 MCG/2ML SOLUTION: Performed by: NURSE ANESTHETIST, CERTIFIED REGISTERED

## 2020-09-25 PROCEDURE — 25010000003 CEFAZOLIN IN DEXTROSE 2-4 GM/100ML-% SOLUTION: Performed by: PLASTIC SURGERY

## 2020-09-25 PROCEDURE — 25010000002 MIDAZOLAM PER 1 MG: Performed by: NURSE ANESTHETIST, CERTIFIED REGISTERED

## 2020-09-25 PROCEDURE — 25010000002 PROPOFOL 10 MG/ML EMULSION: Performed by: NURSE ANESTHETIST, CERTIFIED REGISTERED

## 2020-09-25 PROCEDURE — 25010000002 NEOSTIGMINE 10 MG/10ML SOLUTION: Performed by: NURSE ANESTHETIST, CERTIFIED REGISTERED

## 2020-09-25 PROCEDURE — 25010000002 DEXAMETHASONE PER 1 MG: Performed by: NURSE ANESTHETIST, CERTIFIED REGISTERED

## 2020-09-25 PROCEDURE — 25010000002 ONDANSETRON PER 1 MG: Performed by: NURSE ANESTHETIST, CERTIFIED REGISTERED

## 2020-09-25 DEVICE — KNOTLESS TISSUE CONTROL DEVICE, UNDYED UNIDIRECTIONAL (ANTIBACTERIAL) SYNTHETIC ABSORBABLE DEVICE
Type: IMPLANTABLE DEVICE | Site: BREAST | Status: FUNCTIONAL
Brand: STRATAFIX

## 2020-09-25 DEVICE — NATRELLE INSPIRA SCX 750CC EXTRA FULL PROFILE  SMOOTH ROUND SILICONE
Type: IMPLANTABLE DEVICE | Site: BREAST | Status: FUNCTIONAL
Brand: NATRELLE INSPIRA COHESIVE BREAST IMPLANTS

## 2020-09-25 RX ORDER — LABETALOL HYDROCHLORIDE 5 MG/ML
5 INJECTION, SOLUTION INTRAVENOUS
Status: DISCONTINUED | OUTPATIENT
Start: 2020-09-25 | End: 2020-09-25 | Stop reason: HOSPADM

## 2020-09-25 RX ORDER — LIDOCAINE HYDROCHLORIDE AND EPINEPHRINE 10; 10 MG/ML; UG/ML
INJECTION, SOLUTION INFILTRATION; PERINEURAL AS NEEDED
Status: DISCONTINUED | OUTPATIENT
Start: 2020-09-25 | End: 2020-09-25 | Stop reason: HOSPADM

## 2020-09-25 RX ORDER — ONDANSETRON 2 MG/ML
4 INJECTION INTRAMUSCULAR; INTRAVENOUS ONCE AS NEEDED
Status: DISCONTINUED | OUTPATIENT
Start: 2020-09-25 | End: 2020-09-25 | Stop reason: HOSPADM

## 2020-09-25 RX ORDER — EPHEDRINE SULFATE 50 MG/ML
5 INJECTION, SOLUTION INTRAVENOUS ONCE AS NEEDED
Status: DISCONTINUED | OUTPATIENT
Start: 2020-09-25 | End: 2020-09-25 | Stop reason: HOSPADM

## 2020-09-25 RX ORDER — HYDROMORPHONE HYDROCHLORIDE 1 MG/ML
0.5 INJECTION, SOLUTION INTRAMUSCULAR; INTRAVENOUS; SUBCUTANEOUS
Status: DISCONTINUED | OUTPATIENT
Start: 2020-09-25 | End: 2020-09-25 | Stop reason: HOSPADM

## 2020-09-25 RX ORDER — FAMOTIDINE 20 MG/1
20 TABLET, FILM COATED ORAL ONCE
Status: COMPLETED | OUTPATIENT
Start: 2020-09-25 | End: 2020-09-25

## 2020-09-25 RX ORDER — ROCURONIUM BROMIDE 10 MG/ML
INJECTION, SOLUTION INTRAVENOUS AS NEEDED
Status: DISCONTINUED | OUTPATIENT
Start: 2020-09-25 | End: 2020-09-25 | Stop reason: SURG

## 2020-09-25 RX ORDER — NEOSTIGMINE METHYLSULFATE 1 MG/ML
INJECTION, SOLUTION INTRAVENOUS AS NEEDED
Status: DISCONTINUED | OUTPATIENT
Start: 2020-09-25 | End: 2020-09-25 | Stop reason: SURG

## 2020-09-25 RX ORDER — CEPHALEXIN 500 MG/1
500 CAPSULE ORAL 3 TIMES DAILY
Qty: 15 CAPSULE | Refills: 0 | Status: SHIPPED | OUTPATIENT
Start: 2020-09-25 | End: 2020-09-30

## 2020-09-25 RX ORDER — FENTANYL CITRATE 50 UG/ML
50 INJECTION, SOLUTION INTRAMUSCULAR; INTRAVENOUS
Status: DISCONTINUED | OUTPATIENT
Start: 2020-09-25 | End: 2020-09-25 | Stop reason: HOSPADM

## 2020-09-25 RX ORDER — LIDOCAINE HYDROCHLORIDE 10 MG/ML
0.5 INJECTION, SOLUTION EPIDURAL; INFILTRATION; INTRACAUDAL; PERINEURAL ONCE AS NEEDED
Status: COMPLETED | OUTPATIENT
Start: 2020-09-25 | End: 2020-09-25

## 2020-09-25 RX ORDER — FENTANYL CITRATE 50 UG/ML
INJECTION, SOLUTION INTRAMUSCULAR; INTRAVENOUS AS NEEDED
Status: DISCONTINUED | OUTPATIENT
Start: 2020-09-25 | End: 2020-09-25 | Stop reason: SURG

## 2020-09-25 RX ORDER — PROPOFOL 10 MG/ML
VIAL (ML) INTRAVENOUS AS NEEDED
Status: DISCONTINUED | OUTPATIENT
Start: 2020-09-25 | End: 2020-09-25 | Stop reason: SURG

## 2020-09-25 RX ORDER — ONDANSETRON 2 MG/ML
INJECTION INTRAMUSCULAR; INTRAVENOUS AS NEEDED
Status: DISCONTINUED | OUTPATIENT
Start: 2020-09-25 | End: 2020-09-25 | Stop reason: SURG

## 2020-09-25 RX ORDER — LIDOCAINE HYDROCHLORIDE 10 MG/ML
INJECTION, SOLUTION EPIDURAL; INFILTRATION; INTRACAUDAL; PERINEURAL AS NEEDED
Status: DISCONTINUED | OUTPATIENT
Start: 2020-09-25 | End: 2020-09-25 | Stop reason: SURG

## 2020-09-25 RX ORDER — OXYCODONE HYDROCHLORIDE AND ACETAMINOPHEN 5; 325 MG/1; MG/1
1 TABLET ORAL ONCE
Status: COMPLETED | OUTPATIENT
Start: 2020-09-25 | End: 2020-09-25

## 2020-09-25 RX ORDER — NALOXONE HCL 0.4 MG/ML
0.4 VIAL (ML) INJECTION AS NEEDED
Status: DISCONTINUED | OUTPATIENT
Start: 2020-09-25 | End: 2020-09-25 | Stop reason: HOSPADM

## 2020-09-25 RX ORDER — SODIUM CHLORIDE, SODIUM LACTATE, POTASSIUM CHLORIDE, CALCIUM CHLORIDE 600; 310; 30; 20 MG/100ML; MG/100ML; MG/100ML; MG/100ML
9 INJECTION, SOLUTION INTRAVENOUS CONTINUOUS
Status: DISCONTINUED | OUTPATIENT
Start: 2020-09-25 | End: 2020-09-25 | Stop reason: HOSPADM

## 2020-09-25 RX ORDER — GLYCOPYRROLATE 0.2 MG/ML
INJECTION INTRAMUSCULAR; INTRAVENOUS AS NEEDED
Status: DISCONTINUED | OUTPATIENT
Start: 2020-09-25 | End: 2020-09-25 | Stop reason: SURG

## 2020-09-25 RX ORDER — MEPERIDINE HYDROCHLORIDE 25 MG/ML
12.5 INJECTION INTRAMUSCULAR; INTRAVENOUS; SUBCUTANEOUS
Status: DISCONTINUED | OUTPATIENT
Start: 2020-09-25 | End: 2020-09-25 | Stop reason: HOSPADM

## 2020-09-25 RX ORDER — CEFAZOLIN SODIUM 2 G/100ML
2 INJECTION, SOLUTION INTRAVENOUS ONCE
Status: COMPLETED | OUTPATIENT
Start: 2020-09-25 | End: 2020-09-25

## 2020-09-25 RX ORDER — OXYCODONE HYDROCHLORIDE AND ACETAMINOPHEN 5; 325 MG/1; MG/1
1-2 TABLET ORAL EVERY 4 HOURS PRN
Qty: 20 TABLET | Refills: 0 | Status: SHIPPED | OUTPATIENT
Start: 2020-09-25 | End: 2020-10-05 | Stop reason: SDUPTHER

## 2020-09-25 RX ORDER — SODIUM CHLORIDE, SODIUM LACTATE, POTASSIUM CHLORIDE, CALCIUM CHLORIDE 600; 310; 30; 20 MG/100ML; MG/100ML; MG/100ML; MG/100ML
100 INJECTION, SOLUTION INTRAVENOUS CONTINUOUS
Status: DISCONTINUED | OUTPATIENT
Start: 2020-09-25 | End: 2020-09-25 | Stop reason: HOSPADM

## 2020-09-25 RX ORDER — MIDAZOLAM HYDROCHLORIDE 1 MG/ML
2 INJECTION INTRAMUSCULAR; INTRAVENOUS ONCE
Status: COMPLETED | OUTPATIENT
Start: 2020-09-25 | End: 2020-09-25

## 2020-09-25 RX ORDER — DEXAMETHASONE SODIUM PHOSPHATE 4 MG/ML
INJECTION, SOLUTION INTRA-ARTICULAR; INTRALESIONAL; INTRAMUSCULAR; INTRAVENOUS; SOFT TISSUE AS NEEDED
Status: DISCONTINUED | OUTPATIENT
Start: 2020-09-25 | End: 2020-09-25 | Stop reason: SURG

## 2020-09-25 RX ADMIN — GLYCOPYRROLATE 0.4 MG: 0.2 INJECTION INTRAMUSCULAR; INTRAVENOUS at 09:12

## 2020-09-25 RX ADMIN — MIDAZOLAM 2 MG: 1 INJECTION INTRAMUSCULAR; INTRAVENOUS at 07:26

## 2020-09-25 RX ADMIN — LIDOCAINE HYDROCHLORIDE 50 MG: 10 INJECTION, SOLUTION EPIDURAL; INFILTRATION; INTRACAUDAL; PERINEURAL at 07:43

## 2020-09-25 RX ADMIN — FENTANYL CITRATE 50 MCG: 0.05 INJECTION, SOLUTION INTRAMUSCULAR; INTRAVENOUS at 09:33

## 2020-09-25 RX ADMIN — OXYCODONE HYDROCHLORIDE AND ACETAMINOPHEN 1 TABLET: 5; 325 TABLET ORAL at 10:34

## 2020-09-25 RX ADMIN — DEXAMETHASONE SODIUM PHOSPHATE 8 MG: 4 INJECTION, SOLUTION INTRAMUSCULAR; INTRAVENOUS at 07:43

## 2020-09-25 RX ADMIN — LIDOCAINE HYDROCHLORIDE 0.2 ML: 10 INJECTION, SOLUTION EPIDURAL; INFILTRATION; INTRACAUDAL; PERINEURAL at 06:52

## 2020-09-25 RX ADMIN — FENTANYL CITRATE 50 MCG: 0.05 INJECTION, SOLUTION INTRAMUSCULAR; INTRAVENOUS at 09:44

## 2020-09-25 RX ADMIN — ROCURONIUM BROMIDE 50 MG: 10 INJECTION INTRAVENOUS at 07:43

## 2020-09-25 RX ADMIN — NEOSTIGMINE 3 MG: 1 INJECTION INTRAVENOUS at 09:10

## 2020-09-25 RX ADMIN — FENTANYL CITRATE 50 MCG: 0.05 INJECTION, SOLUTION INTRAMUSCULAR; INTRAVENOUS at 10:18

## 2020-09-25 RX ADMIN — FAMOTIDINE 20 MG: 20 TABLET, FILM COATED ORAL at 06:52

## 2020-09-25 RX ADMIN — ONDANSETRON 4 MG: 2 INJECTION INTRAMUSCULAR; INTRAVENOUS at 08:55

## 2020-09-25 RX ADMIN — FENTANYL CITRATE 100 MCG: 0.05 INJECTION, SOLUTION INTRAMUSCULAR; INTRAVENOUS at 07:43

## 2020-09-25 RX ADMIN — CEFAZOLIN SODIUM 2 G: 2 INJECTION, SOLUTION INTRAVENOUS at 07:35

## 2020-09-25 RX ADMIN — FENTANYL CITRATE 50 MCG: 0.05 INJECTION, SOLUTION INTRAMUSCULAR; INTRAVENOUS at 09:34

## 2020-09-25 RX ADMIN — PROPOFOL 200 MG: 10 INJECTION, EMULSION INTRAVENOUS at 07:43

## 2020-09-25 RX ADMIN — PHENYLEPHRINE HYDROCHLORIDE 80 MCG: 10 INJECTION INTRAVENOUS at 08:24

## 2020-09-25 RX ADMIN — SODIUM CHLORIDE, POTASSIUM CHLORIDE, SODIUM LACTATE AND CALCIUM CHLORIDE 9 ML/HR: 600; 310; 30; 20 INJECTION, SOLUTION INTRAVENOUS at 06:52

## 2020-09-25 NOTE — ANESTHESIA POSTPROCEDURE EVALUATION
Patient: Estefany Elenatox    Procedure Summary     Date: 09/25/20 Room / Location:  [facility]    Anesthesia Start: 0735 Anesthesia Stop:     Procedure: BILATERAL TISSUE EXPANDER EXCHANGE FOR PERMANENT IMPLANTS (Bilateral Breast) Diagnosis:     Surgeon: Pablo Lockhart MD Provider: Jori Porter MD    Anesthesia Type: general ASA Status: 3          Anesthesia Type: general    Vitals  No vitals data found for the desired time range.          Post Anesthesia Care and Evaluation    Patient location during evaluation: PACU  Patient participation: complete - patient participated  Level of consciousness: awake and alert  Pain score: 0  Pain management: adequate  Airway patency: patent  Anesthetic complications: No anesthetic complications  PONV Status: none  Cardiovascular status: hemodynamically stable and acceptable  Respiratory status: nonlabored ventilation, acceptable and nasal cannula  Hydration status: acceptable

## 2020-09-25 NOTE — ANESTHESIA PROCEDURE NOTES
Airway  Urgency: elective    Date/Time: 9/25/2020 7:44 AM  Airway not difficult    General Information and Staff    Patient location during procedure: OR  CRNA: Flako Herrera CRNA    Indications and Patient Condition  Indications for airway management: airway protection    Preoxygenated: yes  MILS not maintained throughout  Mask difficulty assessment: 1 - vent by mask    Final Airway Details  Final airway type: endotracheal airway      Successful airway: ETT  Cuffed: yes   Successful intubation technique: direct laryngoscopy  Facilitating devices/methods: Bougie  Endotracheal tube insertion site: oral  Blade: Tavarez  Blade size: 2  ETT size (mm): 7.0  Cormack-Lehane Classification: grade IIa - partial view of glottis  Placement verified by: chest auscultation and capnometry   Cuff volume (mL): 5  Measured from: lips  ETT/EBT  to lips (cm): 21  Number of attempts at approach: 1  Assessment: lips, teeth, and gum same as pre-op and atraumatic intubation    Additional Comments  Small amount of blood suctioned out of Ett after intubation. Negative epigastric sounds, Breath sound equal bilaterally with symmetric chest rise and fall

## 2020-09-25 NOTE — H&P
Patient Care Team:      Chief complaint: S/P mastectomies and reconstruction    Subjective:  Patient is a 45 y.o.female presents with a history of breast cancer.  She underwent bilateral mastectomy 6/2018, then delayed reconstruction.  She underwent chemotherapy.  She denies any recent changes in her breast or in her general health.    Review of Systems:  General ROS: negative  Cardiovascular ROS: no chest pain or dyspnea on exertion  Respiratory ROS: no cough, shortness of breath, or wheezing      Allergies:   Allergies   Allergen Reactions   • Chlorhexidine Rash     Pt. developed redness and burning sensation after using.   • Codeine GI Intolerance     If take with nexium pt is okay  or if she can have zofran with          Latex: no  Contrast Dye: no    Home Meds    Medications Prior to Admission   Medication Sig Dispense Refill Last Dose   • ALPRAZolam (XANAX) 1 MG tablet Take 1 mg by mouth 3 (Three) Times a Day.  2 9/24/2020 at Unknown time   • B Complex Vitamins (vitamin b complex) capsule capsule Take  by mouth Daily.   9/24/2020 at Unknown time   • Cholecalciferol (Vitamin D3) 10 MCG (400 UNIT) capsule Take  by mouth Every Morning.   9/24/2020 at Unknown time   • DULoxetine (CYMBALTA) 30 MG capsule Take 30 mg by mouth Daily.  6 9/24/2020 at Unknown time   • metoprolol succinate XL (TOPROL-XL) 50 MG 24 hr tablet Take 50 mg by mouth Daily.   9/24/2020 at 1200   • Multiple Vitamins-Minerals (MULTIVITAMIN ADULT PO) Take 1 dose by mouth Every Morning.   9/24/2020 at Unknown time   • omeprazole (priLOSEC) 20 MG capsule Take 20 mg by mouth Daily.   9/24/2020 at Unknown time   • polyethyl glycol-propyl glycol (SYSTANE) 0.4-0.3 % solution ophthalmic solution Administer 1 drop to both eyes As Needed (for itchy eyes).   More than a month at Unknown time     PMH:   Past Medical History:   Diagnosis Date   • Anxiety    • Arthritis     IN SI JOINT   • Breast cancer, right (CMS/HCC) 1/10/2018    s/p bilateral mastectomy;  chemo and radiation as well    • Depression    • Dizzy    • GERD (gastroesophageal reflux disease) 01/23/2018    chemo related    • H/O Bell's palsy     oct 25 2015 or 2016   • High blood pressure    • History of chemotherapy     last therapy5/2018   • History of kidney stones    • History of radiation therapy    • Itchy eyes     bilat - from bells palsy    • Low back pain     DUE TO ARTHRITIS   • Migraine     HAS NOT HAD A MIGRAINE IN A YEAR   • Muscle ache     all over    • SOBOE (shortness of breath on exertion)    • Tachycardia    • Tattoos     BACK AND LEFT HIP   • Wears glasses      PSH:    Past Surgical History:   Procedure Laterality Date   • BREAST BIOPSY Bilateral     and a lymphnode on the right. also skin punch biopsies   • BREAST RECONSTRUCTION WITH LATISSIMUS MUSCLE FLAP AND IMPLANT INSERTION Bilateral 6/16/2020    Procedure: BREAST LATISSIMUS DORSI FLAP WITH TISSUE EXPANDER RIGHT, DELAYED LEFT BREAST RECONSTRUCTION WITH TISSUE EXPANDER AND ALLODERM;  Surgeon: Pablo Lockhart MD;  Location:  [facility];  Service: Plastics;  Laterality: Bilateral;   • COLONOSCOPY  2015    x2   • DILATATION AND CURETTAGE      several with several miscarriages    • ENDOMETRIAL ABLATION     • ENDOSCOPY  2015   • MASTECTOMY WITH SENTINEL NODE BIOPSY AND AXILLARY NODE DISSECTION Right 6/21/2018    Procedure: RIGHT SKIN SPARING BREAST MASTECTOMY WITH RIGHT SENTINEL NODE BIOPSY, PROPHYLATIC SKIN SPARING MASTECTOMY, AND REMOVAL OF PORT;  Surgeon: Sandeep Caneal MD;  Location:  [facility];  Service: General bilateral mastectomy   • PORTACATH PLACEMENT     • TONSILLECTOMY      unsure about anoids - 1990    • TOTAL LAPAROSCOPIC HYSTERECTOMY N/A 9/5/2019    Procedure: TOTAL ROBOTIC HYSTERECTOMY WITH BILATERAL SALPINGO-OOPHORECTOMY, EXCISION IOF VULVAR SKIN TAG;  Surgeon: Li Faye MD;  Location:  [facility];  Service: DaVinci   • VENOUS ACCESS DEVICE (PORT) REMOVAL     • WISDOM TOOTH EXTRACTION      all 4- 1994      Immunization History: pneumo: no   Flu: no  Tetanus: no  Social History:   Tobacco: former   Alcohol: no      Physical Exam:BP (!) 138/105 (BP Location: Left arm, Patient Position: Lying)   Pulse 87   Temp 97.4 °F (36.3 °C) (Tympanic)   Resp 18   LMP 11/29/2017 Comment: last mammogram 2018  SpO2 97%   Breastfeeding No       General Appearance:    Alert, cooperative, no distress, appears stated age   Head:    Normocephalic, without obvious abnormality, atraumatic   Lungs:     Clear to auscultation bilaterally, respirations unlabored    Heart: Regular rate and rhythm, S1 and S2 normal, no murmur, rub    or gallop    Abdomen:    Soft without tenderness   Breast Exam:    deferred   Genitalia:    deferred   Extremities:   Extremities normal, atraumatic, no cyanosis or edema   Skin:   Skin color, texture, turgor normal, no rashes or lesions   Neurologic:   Grossly intact     Results Review: CBC, Chem profile, EKG on chart.  Covid-neg    Impression: S/P  Bilateral mastectomies and reconstruction for cancer    Plan: For bilateral tissue expanders exchange for permanent implants today  JAYCEE Dan 9/25/2020 06:51 EDT

## 2020-09-25 NOTE — ANESTHESIA PREPROCEDURE EVALUATION
Anesthesia Evaluation     Patient summary reviewed and Nursing notes reviewed   NPO Solid Status: > 8 hours  NPO Liquid Status: > 8 hours           Airway   Mallampati: II  TM distance: >3 FB  Neck ROM: full  No difficulty expected  Dental      Pulmonary    (+) shortness of breath,   (-) asthma, recent URI, sleep apnea  Cardiovascular     ECG reviewed  Patient on routine beta blocker    (+) hypertension, dysrhythmias (Tachy on B Blockers ) Tachycardia,   (-) past MI, CAD, angina, cardiac stents    ROS comment: ECHO Patient has a history of breast cancer and treatment with Adriamycin.  Left ventricular systolic function is low normal. Estimated EF = 45%. There is global hypokinesis.  Reduced from 61% before chemo    Neuro/Psych  (+) headaches (Miraine ), dizziness/light headedness, psychiatric history (xanax cymbalta),     (-) seizures, CVA, numbness    ROS Comment: [place] palsy  GI/Hepatic/Renal/Endo    (+) obesity, morbid obesity, GERD,  renal disease stones,   (-) hepatitis, liver disease, diabetes, no thyroid disorder    Musculoskeletal     Abdominal    Substance History      OB/GYN          Other   arthritis,    history of cancer (R Breast ca -last chemo 2018 )        Phys Exam Other: Mac 3  Grade 1 view 2020  [place] 2  Grade 1 view 2018               Anesthesia Plan    ASA 3     general   (Propofol Infusion as part of Anti PONV tech   ?PECS )  intravenous induction     Anesthetic plan, all risks, benefits, and alternatives have been provided, discussed and informed consent has been obtained with: patient.    Plan discussed with CRNA.

## 2020-09-25 NOTE — BRIEF OP NOTE
BREAST RECONSTRUCTION, BREAST TISSUE EXPANDER REMOVAL, IMPLANT INSERTION  Progress Note    Estefany Elenatox  9/25/2020    Pre-op Diagnosis:   Bilateral breasts with expanders in place after mastectomies       Post-Op Diagnosis Codes:   same    Procedure/CPT® Codes:        Procedure(s):  BILATERAL TISSUE EXPANDER EXCHANGE FOR PERMANENT IMPLANTS    Surgeon(s):  Pablo Lockhart MD    Anesthesia: General    Staff:   Circulator: Taylor Avelar RN; José Miguel Toure RN  Scrub Person: Devorah Og  Nursing Assistant: Abram Wheeler PCT  Assistant: Tasha Kingston PA  Assistant: Tasha Kingston PA      Estimated Blood Loss: 50 mL    Urine Voided: * No values recorded between 9/25/2020  7:35 AM and 9/25/2020  9:23 AM *    Specimens:                None          Drains:   [REMOVED] Y Closed/Suction Drain 3 and 4 Right Breast Bulb (Removed)       [REMOVED] Y Closed/Suction Drain 3 and 4 Left Breast Bulb (Removed)       [REMOVED] Y Closed/Suction Drain 5 and 6 Left Breast Bulb (Removed)       [REMOVED] Closed/Suction Drain 1 Right Back Bulb 15 Fr. (Removed)       [REMOVED] Closed/Suction Drain 2 Right Back Bulb 15 Fr. (Removed)       Findings: Allergan  cc implant on left and  cc implant on right.     Complications: none immediate     Assistant: Tasha Kingston PA  was responsible for performing the following activities: Suturing and their skilled assistance was necessary for the success of this case.    Pablo Lockhart MD     Date: 9/25/2020  Time: 09:39 EDT

## 2020-09-25 NOTE — OP NOTE
DATE OF PROCEDURE: 09/25/20    PREOPERATIVE DIAGNOSIS: Bilateral absent breasts with tissue expanders in place, status post mastectomies.     POSTOPERATIVE DIAGNOSIS: Bilateral absent breasts with tissue expanders in place, status post mastectomies.       PROCEDURES PERFORMED:  1. Removal of bilateral breast tissue expanders and placement of permanent silicone breast implants.   2. Bilateral open periprosthetic capsulotomy.     SURGEON: Pablo Lockhart MD     ASSISTANT: Tasha Kingston PA-C     ANESTHESIA: General.     INDICATIONS: The patient is a 45-year-old female who had right-sided breast cancer and she underwent bilateral mastectomies. The patient had tissue expanders placed in a delayed fashion as well as a right latissimus flap due to her prior radiation. She finished her subsequent expansion on both sides. She was taken to the operating room today for removal of bilateral breast tissue expanders and placement of permanent breast implants. The patient understood all of the risks and benefits of the procedure, including infection, need for future surgeries and elected to proceed.      FINDINGS:  1. Placement of an Allergan-style  cc implant silicone on the right with a serial number of [id number].  2. Placement of an Allergan-style  cc silicone breast implant on the left with a serial number of [id number].      DESCRIPTION OF PROCEDURE: The patient was seen in preoperative holding and then marked in a standing position and taken to the operating room by anesthesia after informed consent was signed and on the chart. The patient was placed supine on the operating room table and general anesthesia was induced. Once verified, the case was then turned over to the surgical service. The patient had her chest and abdomen prepped and draped in a normal sterile fashion. A timeout was called and all parties were in agreement including nursing and anesthesia. Preoperative antibiotics were given. We began on  the right side with a #15 blade scalpel making an incision with Bovie cautery down to the capsule. The expander was encountered. I removed all of the saline fluid with an 18-gauge needle and then the expander was then removed from the pocket. The pocket was inspected and noted to be in position. Copious antibiotic irrigation was used in the pocket, and then a limited open capsulotomy was performed on the right. I then moved to the left side and removed the expander in a similar fashion. She was noted to have a tight capsule so we did a significant open periprosthetic capsulotomy.  Again, multiple liters of antibiotic irrigation were used in the pocket. I then scored the entire capsule both inferiorly, anteriorly and superiorly all over to allow it to expand better. This was done several times, even after placing sizers to try to expand the skin as best as possible. I placed multiple different sizers and settled with the aforementioned implants of 700 mL implant on the right and a 750 mL on the left for symmetry purposes. I then excised a large amount of axillary tissue from each side for better contouring. This was done with a 10 blade scalpel. Boive for hemostasis. At this point, the capsules were then closed with 2-0 Vicryl in an interrupted fashion and then a 3-0 Monocryl and a 3-0 Strattafix to close the skin. Prineo glue was placed on the incisions. She then had Kerlix fluffs and a surgical bra placed. She was awakened, extubated and taken to PACU in stable condition. All sponge and instrument counts were correct. I, Dr. Pablo Lockhart, was present and scrubbed for the entire procedure.      SPECIMENS: None.         ESTIMATED BLOOD LOSS: 20 cc.     DRAINS: None.      COMPLICATIONS: None immediate.         Pablo Lockhart MD  09/25/20  09:40 EDT

## 2020-10-05 ENCOUNTER — HOSPITAL ENCOUNTER (EMERGENCY)
Facility: HOSPITAL | Age: 45
Discharge: HOME | End: 2020-10-05
Attending: STUDENT IN AN ORGANIZED HEALTH CARE EDUCATION/TRAINING PROGRAM | Admitting: STUDENT IN AN ORGANIZED HEALTH CARE EDUCATION/TRAINING PROGRAM

## 2020-10-05 ENCOUNTER — APPOINTMENT (OUTPATIENT)
Dept: CT IMAGING | Facility: HOSPITAL | Age: 45
End: 2020-10-05

## 2020-10-05 VITALS
OXYGEN SATURATION: 98 % | BODY MASS INDEX: 33.22 KG/M2 | DIASTOLIC BLOOD PRESSURE: 95 MMHG | SYSTOLIC BLOOD PRESSURE: 136 MMHG | TEMPERATURE: 97.8 F | WEIGHT: 219.2 LBS | HEART RATE: 92 BPM | HEIGHT: 68 IN | RESPIRATION RATE: 18 BRPM

## 2020-10-05 DIAGNOSIS — N20.1 URETEROLITHIASIS: Primary | ICD-10-CM

## 2020-10-05 LAB
ALBUMIN SERPL-MCNC: 4.6 G/DL (ref 3.5–5.2)
ALBUMIN/GLOB SERPL: 1.6 G/DL
ALP SERPL-CCNC: 158 U/L (ref 39–117)
ALT SERPL W P-5'-P-CCNC: 36 U/L (ref 1–33)
ANION GAP SERPL CALCULATED.3IONS-SCNC: 14.6 MMOL/L (ref 5–15)
AST SERPL-CCNC: 25 U/L (ref 1–32)
BACTERIA UR QL AUTO: ABNORMAL /HPF
BASOPHILS # BLD AUTO: 0.12 10*3/MM3 (ref 0–0.2)
BASOPHILS NFR BLD AUTO: 1.5 % (ref 0–1.5)
BILIRUB SERPL-MCNC: 0.2 MG/DL (ref 0–1.2)
BILIRUB UR QL STRIP: ABNORMAL
BUN SERPL-MCNC: 11 MG/DL (ref 6–20)
BUN/CREAT SERPL: 12.9 (ref 7–25)
CALCIUM SPEC-SCNC: 10.1 MG/DL (ref 8.6–10.5)
CHLORIDE SERPL-SCNC: 101 MMOL/L (ref 98–107)
CLARITY UR: CLEAR
CO2 SERPL-SCNC: 24.4 MMOL/L (ref 22–29)
COLOR UR: ABNORMAL
CREAT SERPL-MCNC: 0.85 MG/DL (ref 0.57–1)
DEPRECATED RDW RBC AUTO: 43.5 FL (ref 37–54)
EOSINOPHIL # BLD AUTO: 0.29 10*3/MM3 (ref 0–0.4)
EOSINOPHIL NFR BLD AUTO: 3.6 % (ref 0.3–6.2)
ERYTHROCYTE [DISTWIDTH] IN BLOOD BY AUTOMATED COUNT: 13.3 % (ref 12.3–15.4)
GFR SERPL CREATININE-BSD FRML MDRD: 72 ML/MIN/1.73
GLOBULIN UR ELPH-MCNC: 2.9 GM/DL
GLUCOSE SERPL-MCNC: 134 MG/DL (ref 65–99)
GLUCOSE UR STRIP-MCNC: NEGATIVE MG/DL
HCT VFR BLD AUTO: 40.1 % (ref 34–46.6)
HGB BLD-MCNC: 13.3 G/DL (ref 12–15.9)
HGB UR QL STRIP.AUTO: ABNORMAL
HOLD SPECIMEN: NORMAL
HOLD SPECIMEN: NORMAL
HYALINE CASTS UR QL AUTO: ABNORMAL /LPF
IMM GRANULOCYTES # BLD AUTO: 0.01 10*3/MM3 (ref 0–0.05)
IMM GRANULOCYTES NFR BLD AUTO: 0.1 % (ref 0–0.5)
KETONES UR QL STRIP: NEGATIVE
LEUKOCYTE ESTERASE UR QL STRIP.AUTO: ABNORMAL
LIPASE SERPL-CCNC: 48 U/L (ref 13–60)
LYMPHOCYTES # BLD AUTO: 2.75 10*3/MM3 (ref 0.7–3.1)
LYMPHOCYTES NFR BLD AUTO: 33.9 % (ref 19.6–45.3)
MCH RBC QN AUTO: 29.5 PG (ref 26.6–33)
MCHC RBC AUTO-ENTMCNC: 33.2 G/DL (ref 31.5–35.7)
MCV RBC AUTO: 88.9 FL (ref 79–97)
MONOCYTES # BLD AUTO: 0.7 10*3/MM3 (ref 0.1–0.9)
MONOCYTES NFR BLD AUTO: 8.6 % (ref 5–12)
NEUTROPHILS NFR BLD AUTO: 4.24 10*3/MM3 (ref 1.7–7)
NEUTROPHILS NFR BLD AUTO: 52.3 % (ref 42.7–76)
NITRITE UR QL STRIP: POSITIVE
NRBC BLD AUTO-RTO: 0 /100 WBC (ref 0–0.2)
PH UR STRIP.AUTO: <=5 [PH] (ref 5–8)
PLATELET # BLD AUTO: 329 10*3/MM3 (ref 140–450)
PMV BLD AUTO: 10.2 FL (ref 6–12)
POTASSIUM SERPL-SCNC: 3.9 MMOL/L (ref 3.5–5.2)
PROT SERPL-MCNC: 7.5 G/DL (ref 6–8.5)
PROT UR QL STRIP: ABNORMAL
RBC # BLD AUTO: 4.51 10*6/MM3 (ref 3.77–5.28)
RBC # UR: ABNORMAL /HPF
REF LAB TEST METHOD: ABNORMAL
SODIUM SERPL-SCNC: 140 MMOL/L (ref 136–145)
SP GR UR STRIP: 1.02 (ref 1–1.03)
SQUAMOUS #/AREA URNS HPF: ABNORMAL /HPF
UROBILINOGEN UR QL STRIP: ABNORMAL
WBC # BLD AUTO: 8.11 10*3/MM3 (ref 3.4–10.8)
WBC UR QL AUTO: ABNORMAL /HPF
WHOLE BLOOD HOLD SPECIMEN: NORMAL
WHOLE BLOOD HOLD SPECIMEN: NORMAL

## 2020-10-05 PROCEDURE — 85025 COMPLETE CBC W/AUTO DIFF WBC: CPT | Performed by: STUDENT IN AN ORGANIZED HEALTH CARE EDUCATION/TRAINING PROGRAM

## 2020-10-05 PROCEDURE — 83690 ASSAY OF LIPASE: CPT | Performed by: STUDENT IN AN ORGANIZED HEALTH CARE EDUCATION/TRAINING PROGRAM

## 2020-10-05 PROCEDURE — 80053 COMPREHEN METABOLIC PANEL: CPT | Performed by: STUDENT IN AN ORGANIZED HEALTH CARE EDUCATION/TRAINING PROGRAM

## 2020-10-05 PROCEDURE — 96375 TX/PRO/DX INJ NEW DRUG ADDON: CPT

## 2020-10-05 PROCEDURE — 25010000002 MORPHINE PER 10 MG: Performed by: STUDENT IN AN ORGANIZED HEALTH CARE EDUCATION/TRAINING PROGRAM

## 2020-10-05 PROCEDURE — 81001 URINALYSIS AUTO W/SCOPE: CPT | Performed by: STUDENT IN AN ORGANIZED HEALTH CARE EDUCATION/TRAINING PROGRAM

## 2020-10-05 PROCEDURE — 25010000002 CEFTRIAXONE SODIUM-DEXTROSE 1-3.74 GM-%(50ML) RECONSTITUTED SOLUTION: Performed by: PHYSICIAN ASSISTANT

## 2020-10-05 PROCEDURE — 25010000002 ONDANSETRON PER 1 MG: Performed by: PHYSICIAN ASSISTANT

## 2020-10-05 PROCEDURE — 74176 CT ABD & PELVIS W/O CONTRAST: CPT

## 2020-10-05 PROCEDURE — 99284 EMERGENCY DEPT VISIT MOD MDM: CPT

## 2020-10-05 PROCEDURE — 25010000002 KETOROLAC TROMETHAMINE PER 15 MG: Performed by: PHYSICIAN ASSISTANT

## 2020-10-05 PROCEDURE — 96365 THER/PROPH/DIAG IV INF INIT: CPT

## 2020-10-05 RX ORDER — OXYCODONE HYDROCHLORIDE AND ACETAMINOPHEN 5; 325 MG/1; MG/1
1 TABLET ORAL EVERY 4 HOURS PRN
Qty: 12 TABLET | Refills: 0 | Status: SHIPPED | OUTPATIENT
Start: 2020-10-05 | End: 2021-02-04

## 2020-10-05 RX ORDER — ONDANSETRON 4 MG/1
4 TABLET, ORALLY DISINTEGRATING ORAL EVERY 6 HOURS PRN
Qty: 12 TABLET | Refills: 0 | Status: SHIPPED | OUTPATIENT
Start: 2020-10-05 | End: 2021-02-04

## 2020-10-05 RX ORDER — ONDANSETRON 2 MG/ML
4 INJECTION INTRAMUSCULAR; INTRAVENOUS ONCE
Status: COMPLETED | OUTPATIENT
Start: 2020-10-05 | End: 2020-10-05

## 2020-10-05 RX ORDER — KETOROLAC TROMETHAMINE 10 MG/1
10 TABLET, FILM COATED ORAL EVERY 6 HOURS PRN
Qty: 12 TABLET | Refills: 0 | Status: SHIPPED | OUTPATIENT
Start: 2020-10-05 | End: 2021-02-04

## 2020-10-05 RX ORDER — KETOROLAC TROMETHAMINE 30 MG/ML
15 INJECTION, SOLUTION INTRAMUSCULAR; INTRAVENOUS ONCE
Status: COMPLETED | OUTPATIENT
Start: 2020-10-05 | End: 2020-10-05

## 2020-10-05 RX ORDER — TAMSULOSIN HYDROCHLORIDE 0.4 MG/1
0.4 CAPSULE ORAL ONCE
Status: COMPLETED | OUTPATIENT
Start: 2020-10-05 | End: 2020-10-05

## 2020-10-05 RX ORDER — MORPHINE SULFATE 4 MG/ML
4 INJECTION, SOLUTION INTRAMUSCULAR; INTRAVENOUS ONCE
Status: COMPLETED | OUTPATIENT
Start: 2020-10-05 | End: 2020-10-05

## 2020-10-05 RX ORDER — CEFTRIAXONE 1 G/50ML
1 INJECTION, SOLUTION INTRAVENOUS ONCE
Status: COMPLETED | OUTPATIENT
Start: 2020-10-05 | End: 2020-10-05

## 2020-10-05 RX ORDER — OXYCODONE AND ACETAMINOPHEN 10; 325 MG/1; MG/1
1 TABLET ORAL ONCE
Status: COMPLETED | OUTPATIENT
Start: 2020-10-05 | End: 2020-10-05

## 2020-10-05 RX ORDER — SODIUM CHLORIDE 0.9 % (FLUSH) 0.9 %
10 SYRINGE (ML) INJECTION AS NEEDED
Status: DISCONTINUED | OUTPATIENT
Start: 2020-10-05 | End: 2020-10-05 | Stop reason: HOSPADM

## 2020-10-05 RX ADMIN — CEFTRIAXONE 1 G: 1 INJECTION, SOLUTION INTRAVENOUS at 20:34

## 2020-10-05 RX ADMIN — TAMSULOSIN HYDROCHLORIDE 0.4 MG: 0.4 CAPSULE ORAL at 21:08

## 2020-10-05 RX ADMIN — OXYCODONE HYDROCHLORIDE AND ACETAMINOPHEN 1 TABLET: 10; 325 TABLET ORAL at 21:46

## 2020-10-05 RX ADMIN — ONDANSETRON 4 MG: 2 INJECTION INTRAMUSCULAR; INTRAVENOUS at 19:44

## 2020-10-05 RX ADMIN — LIDOCAINE HYDROCHLORIDE 150 MG: 10 INJECTION, SOLUTION INFILTRATION; PERINEURAL at 21:08

## 2020-10-05 RX ADMIN — KETOROLAC TROMETHAMINE 15 MG: 30 INJECTION, SOLUTION INTRAMUSCULAR; INTRAVENOUS at 19:45

## 2020-10-05 RX ADMIN — MORPHINE SULFATE 4 MG: 4 INJECTION, SOLUTION INTRAMUSCULAR; INTRAVENOUS at 20:37

## 2020-10-05 NOTE — ED PROVIDER NOTES
"Subjective   This is a 44 y/o female that comes in with c/c \"Right flank pain\" X 2 days. Patient states that she has had sharp, stabbing pain. States that she has had intermittent dysuria. Patient has had associated nausea, but no vomiting. Patient denies any other associated symptoms at this time. Does state that she has history of kidney stones. States that this does feel similar to previous symptoms.         History provided by:  Patient   used: No    Flank Pain  Pain location:  R flank  Pain quality: sharp and stabbing    Pain radiates to:  Does not radiate  Pain severity:  Moderate  Onset quality:  Gradual  Duration:  1 day  Timing:  Intermittent  Progression:  Worsening  Chronicity:  New  Context: not alcohol use, not awakening from sleep, not diet changes, not eating, not previous surgeries, not recent illness, not recent sexual activity, not retching, not sick contacts and not suspicious food intake    Relieved by:  Nothing  Worsened by:  Nothing  Ineffective treatments:  None tried  Associated symptoms: chills, nausea and vomiting    Associated symptoms: no chest pain, no dysuria, no fatigue, no fever, no flatus, no hematemesis and no shortness of breath    Risk factors: no alcohol abuse, no aspirin use, has not had multiple surgeries, no NSAID use, not pregnant and no recent hospitalization        Review of Systems   Constitutional: Positive for chills. Negative for fatigue and fever.   HENT: Negative.    Eyes: Negative.    Respiratory: Negative.  Negative for apnea, choking and shortness of breath.    Cardiovascular: Negative.  Negative for chest pain, palpitations and leg swelling.   Gastrointestinal: Positive for nausea and vomiting. Negative for flatus and hematemesis.   Endocrine: Negative.  Negative for cold intolerance, heat intolerance, polydipsia and polyphagia.   Genitourinary: Positive for flank pain, frequency and urgency. Negative for dysuria.   Musculoskeletal: Negative " for arthralgias, back pain and joint swelling.   Hematological: Negative.  Negative for adenopathy. Does not bruise/bleed easily.   Psychiatric/Behavioral: Negative.    All other systems reviewed and are negative.      Past Medical History:   Diagnosis Date   • Anxiety    • Arthritis     IN SI JOINT   • Breast cancer, right (CMS/HCC) 1/10/2018    s/p bilateral mastectomy; chemo and radiation as well    • Depression    • Dizzy    • GERD (gastroesophageal reflux disease) 01/23/2018    chemo related    • H/O Bell's palsy     oct 25 2015 or 2016   • High blood pressure    • History of chemotherapy     last therapy5/2018   • History of kidney stones    • History of radiation therapy    • Itchy eyes     bilat - from bells palsy    • Low back pain     DUE TO ARTHRITIS   • Migraine     HAS NOT HAD A MIGRAINE IN A YEAR   • Muscle ache     all over    • SOBOE (shortness of breath on exertion)    • Tachycardia    • Tattoos     BACK AND LEFT HIP   • Wears glasses        Allergies   Allergen Reactions   • Chlorhexidine Rash     Pt. developed redness and burning sensation after using.   • Codeine GI Intolerance     If take with nexium pt is okay  or if she can have zofran with       Past Surgical History:   Procedure Laterality Date   • BREAST BIOPSY Bilateral     and a lymphnode on the right. also skin punch biopsies   • BREAST RECONSTRUCTION WITH LATISSIMUS MUSCLE FLAP AND IMPLANT INSERTION Bilateral 6/16/2020    Procedure: BREAST LATISSIMUS DORSI FLAP WITH TISSUE EXPANDER RIGHT, DELAYED LEFT BREAST RECONSTRUCTION WITH TISSUE EXPANDER AND ALLODERM;  Surgeon: Pablo Lockhart MD;  [facility];  Service: Plastics;  Laterality: Bilateral;   • BREAST RECONSTRUCTION, BREAST TISSUE EXPANDER REMOVAL, IMPLANT INSERTION Bilateral 9/25/2020    Procedure: TISSUE EXPANDER EXCHANGE FOR PERMANENT IMPLANTS BILATERAL;  Surgeon: Pablo Lockhart MD;  [facility];  Service: Plastics;  Laterality: Bilateral;   •  COLONOSCOPY  2015    x2   • DILATATION AND CURETTAGE      several with several miscarriages    • ENDOMETRIAL ABLATION     • ENDOSCOPY  2015   • MASTECTOMY WITH SENTINEL NODE BIOPSY AND AXILLARY NODE DISSECTION Right 2018    Procedure: RIGHT SKIN SPARING BREAST MASTECTOMY WITH RIGHT SENTINEL NODE BIOPSY, PROPHYLATIC SKIN SPARING MASTECTOMY, AND REMOVAL OF PORT;  Surgeon: Sandeep Canela MD;  Location:  [facility];  Service: General bilateral mastectomy   • PORTACATH PLACEMENT     • TONSILLECTOMY      unsure about anoids -     • TOTAL LAPAROSCOPIC HYSTERECTOMY N/A 2019    Procedure: TOTAL ROBOTIC HYSTERECTOMY WITH BILATERAL SALPINGO-OOPHORECTOMY, EXCISION IOF VULVAR SKIN TAG;  Surgeon: Li Faye MD;  Location:  [facility];  Service: DaVinci   • VENOUS ACCESS DEVICE (PORT) REMOVAL     • WISDOM TOOTH EXTRACTION      all -        Family History   Problem Relation Age of Onset   • Breast cancer Cousin 25   • Heart disease Mother    • No Known Problems Father        Social History     Socioeconomic History   • Marital status:      Spouse name: Not on file   • Number of children: Not on file   • Years of education: Not on file   • Highest education level: Not on file   Tobacco Use   • Smoking status: Former Smoker     Packs/day: 0.50     Years: 20.00     Pack years: 10.00     Types: Electronic Cigarette, Cigarettes     Quit date:      Years since quittin.7   • Smokeless tobacco: Never Used   • Tobacco comment: quit vaping 4/15/20; quit cigarettes in    Substance and Sexual Activity   • Alcohol use: No   • Drug use: No   • Sexual activity: Defer     Partners: Male           Objective   Physical Exam  Vitals signs and nursing note reviewed.   Constitutional:       General: She is not in acute distress.     Appearance: She is well-developed and normal weight. She is not ill-appearing, toxic-appearing or diaphoretic.   HENT:      Head: Normocephalic and atraumatic.       Mouth/Throat:      Mouth: Mucous membranes are moist.      Pharynx: Oropharynx is clear. No pharyngeal swelling or oropharyngeal exudate.   Eyes:      General: No scleral icterus.     Extraocular Movements: Extraocular movements intact.      Pupils: Pupils are equal, round, and reactive to light.   Cardiovascular:      Rate and Rhythm: Normal rate and regular rhythm.      Heart sounds: Normal heart sounds. No murmur. No friction rub. No gallop.    Pulmonary:      Effort: Pulmonary effort is normal. No respiratory distress.      Breath sounds: Normal breath sounds. No wheezing, rhonchi or rales.   Abdominal:      General: Abdomen is flat. Bowel sounds are normal. There is no distension or abdominal bruit. There are no signs of injury.      Palpations: Abdomen is rigid. There is no shifting dullness, fluid wave, hepatomegaly, splenomegaly or mass.      Tenderness: There is no abdominal tenderness. There is right CVA tenderness. There is no left CVA tenderness or rebound. Negative signs include Singh's sign, Rovsing's sign and psoas sign.      Hernia: No hernia is present.   Skin:     General: Skin is warm and dry.      Capillary Refill: Capillary refill takes less than 2 seconds.      Coloration: Skin is not cyanotic, jaundiced, mottled or pale.      Findings: No erythema.   Neurological:      General: No focal deficit present.      Mental Status: She is alert.      Cranial Nerves: No cranial nerve deficit.      Motor: No weakness.   Psychiatric:         Mood and Affect: Mood normal. Mood is not anxious or depressed.         Behavior: Behavior normal.         Procedures           ED Course  ED Course as of Oct 05 2111   Mon Oct 05, 2020   2043 4 mm stone right UVJ with mild hydroureteronephrosis  bilateral  nephrolithiasis  Fatty liver       [BH]   2051 Discussed care with patient. Patient came in with c/c right sided kidney stone. Patient states that she has had intermittent right flank pain.     [BH]   2055 Patient  does have 4 mm ureterolithiasis will have follow-up with urology. Patient advised to return if condition worsens.      []      ED Course User Index  [] Andriy Easton PA-C                                           [facility]    Final diagnoses:   Ureterolithiasis            Andriy Easton PA-C  10/05/20 2100       Andriy Easton PA-C  10/05/20 2112

## 2021-01-20 ENCOUNTER — TELEPHONE (OUTPATIENT)
Dept: ONCOLOGY | Facility: CLINIC | Age: 46
End: 2021-01-20

## 2021-01-20 NOTE — TELEPHONE ENCOUNTER
PT: SELF    PT CALLING TO R/S 1/21 APPT AS SHE CAME HOME FROM WORK SICK. SHE ASK THAT SHE BE SCHEDULED ON A Thursday OR Friday EARLY IN THE MORNING.     PT #: [phone]

## 2021-02-04 ENCOUNTER — OFFICE VISIT (OUTPATIENT)
Dept: ONCOLOGY | Facility: CLINIC | Age: 46
End: 2021-02-04

## 2021-02-04 ENCOUNTER — LAB (OUTPATIENT)
Dept: LAB | Facility: HOSPITAL | Age: 46
End: 2021-02-04

## 2021-02-04 VITALS
HEIGHT: 68 IN | OXYGEN SATURATION: 97 % | DIASTOLIC BLOOD PRESSURE: 87 MMHG | RESPIRATION RATE: 18 BRPM | BODY MASS INDEX: 33.49 KG/M2 | TEMPERATURE: 96.9 F | WEIGHT: 221 LBS | HEART RATE: 107 BPM | SYSTOLIC BLOOD PRESSURE: 130 MMHG

## 2021-02-04 DIAGNOSIS — Z17.1 MALIGNANT NEOPLASM OF RIGHT BREAST IN FEMALE, ESTROGEN RECEPTOR NEGATIVE, UNSPECIFIED SITE OF BREAST (HCC): Primary | ICD-10-CM

## 2021-02-04 DIAGNOSIS — C50.911 MALIGNANT NEOPLASM OF RIGHT BREAST IN FEMALE, ESTROGEN RECEPTOR NEGATIVE, UNSPECIFIED SITE OF BREAST (HCC): Primary | ICD-10-CM

## 2021-02-04 DIAGNOSIS — Z17.1 MALIGNANT NEOPLASM OF AREOLA OF RIGHT BREAST IN FEMALE, ESTROGEN RECEPTOR NEGATIVE (HCC): ICD-10-CM

## 2021-02-04 DIAGNOSIS — C50.011 MALIGNANT NEOPLASM OF AREOLA OF RIGHT BREAST IN FEMALE, ESTROGEN RECEPTOR NEGATIVE (HCC): ICD-10-CM

## 2021-02-04 LAB
ERYTHROCYTE [DISTWIDTH] IN BLOOD BY AUTOMATED COUNT: 13.5 % (ref 12.3–15.4)
HCT VFR BLD AUTO: 41.8 % (ref 34–46.6)
HGB BLD-MCNC: 13.9 G/DL (ref 12–15.9)
LYMPHOCYTES # BLD AUTO: 2 10*3/MM3 (ref 0.7–3.1)
LYMPHOCYTES NFR BLD AUTO: 35.5 % (ref 19.6–45.3)
MCH RBC QN AUTO: 30.6 PG (ref 26.6–33)
MCHC RBC AUTO-ENTMCNC: 33.3 G/DL (ref 31.5–35.7)
MCV RBC AUTO: 91.8 FL (ref 79–97)
MONOCYTES # BLD AUTO: 0.2 10*3/MM3 (ref 0.1–0.9)
MONOCYTES NFR BLD AUTO: 3.7 % (ref 5–12)
NEUTROPHILS NFR BLD AUTO: 3.5 10*3/MM3 (ref 1.7–7)
NEUTROPHILS NFR BLD AUTO: 60.8 % (ref 42.7–76)
PLATELET # BLD AUTO: 296 10*3/MM3 (ref 140–450)
PMV BLD AUTO: 7.9 FL (ref 6–12)
RBC # BLD AUTO: 4.56 10*6/MM3 (ref 3.77–5.28)
WBC # BLD AUTO: 5.7 10*3/MM3 (ref 3.4–10.8)

## 2021-02-04 PROCEDURE — 99213 OFFICE O/P EST LOW 20 MIN: CPT | Performed by: INTERNAL MEDICINE

## 2021-02-04 PROCEDURE — 85025 COMPLETE CBC W/AUTO DIFF WBC: CPT

## 2021-02-04 PROCEDURE — 36415 COLL VENOUS BLD VENIPUNCTURE: CPT

## 2021-02-04 RX ORDER — DOXYCYCLINE 100 MG/1
CAPSULE ORAL
COMMUNITY
Start: 2020-10-28 | End: 2022-08-18

## 2021-02-04 RX ORDER — PREDNISONE 10 MG/1
TABLET ORAL
COMMUNITY
Start: 2020-10-28 | End: 2021-09-09 | Stop reason: ALTCHOICE

## 2021-02-04 NOTE — PROGRESS NOTES
"      PROBLEM LIST:  1. [id number] (stage IIIC) triple negative IDC of the right breast  A) patient presented with a enlarging mass in the right breast for 5-6 months associated with shooting pain.  Biopsy showed high grade invasive ductal carcinoma, ER negative, W[place] negative, HER-2 negative.  The mass on imaging measures approximately 9.7 cm.  Enlarged abnormal lymph nodes are present on imaging.  FNA of 1 axillary lymph node was negative for malignancy.  PET/CT on 1/12/18 showed large necrotic right breast mass, 2 pathologically enlarged and hypermetabolic intramammary nodes, and no evidence of distant disease.  Breast MRI on 1/17/18 showed evidence of pectoral muscle involvement, as well as several satellite lesions.  B) neoadjuvant ddAC followed by weekly taxol started on 1/23/18.  Taxol stopped after 10 doses due to persistent tachycardia  C) bilateral mastectomy on 6/21/18.  Residual 5 mm high grade IDC in the right breast.  0/4 LN involved.  [id number]  D) adjuvant radiation completed 8/27/18.  2.  Hypertension  3.  Anxiety  4.  Bell's palsy  5. Sinus tachycardia    Subjective      CC: breast cancer    HISTORY OF PRESENT ILLNESS:   Estefany Bryan returns today for follow-up.  She says she is doing okay.  She has been struggling a little bit mentally because her oldest child is getting ready to leave for college and she is feeling emotional about this.  He will be going to [employer] with a focus in [place] history.  Otherwise she says she is doing well.        Objective      /87   Pulse 107   Temp 96.9 °F (36.1 °C) (Temporal)   Resp 18   Ht 172.7 cm (67.99\")   Wt 100 kg (221 lb)   LMP 11/29/2017 Comment: last mammogram 2018  SpO2 97%   BMI 33.61 kg/m²      Performance Status: 0    General: well appearing female in no acute distress  Neuro: alert and oriented  HEENT: sclera anicteric, oropharynx clear  Lymphatics: no cervical, supraclavicular adenopathy. No palpable axillary " adenopathy  Chest: Bilateral breast reconstruction with no palpable masses or lesions  Cardiovascular: regular rate and rhythm, no murmurs  Lungs: clear to auscultation bilaterally  Abdomen: soft, nontender, nondistended.  No palpable organomegaly  Extremeties: no lower extremity edema  Skin: no rashes, lesions, bruising, or petechiae  Psych: mood and affect appropriate    Labs:  Lab Results   Component Value Date    WBC 5.70 02/04/2021    HGB 13.9 02/04/2021    HCT 41.8 02/04/2021    MCV 91.8 02/04/2021     02/04/2021     Lab Results   Component Value Date    GLUCOSE 134 (H) 10/05/2020    BUN 11 10/05/2020    CREATININE 0.85 10/05/2020    EGFRIFNONA 72 10/05/2020    BCR 12.9 10/05/2020    K 3.9 10/05/2020    CO2 24.4 10/05/2020    CALCIUM 10.1 10/05/2020    ALBUMIN 4.60 10/05/2020    LABIL2 1.4 08/01/2015    AST 25 10/05/2020    ALT 36 (H) 10/05/2020       Assessment/Plan   Estefany Bryan is a 45 y.o. year old female with [id number] triple negative breast cancer who returns for follow up.   She is doing well with no evidence of disease recurrence.      We will continue to see her every 6 months until it has been 5 years from completion of treatment.                Winsome Romano MD  [facility]    2/4/2021          CC:

## 2021-03-30 ENCOUNTER — TELEPHONE (OUTPATIENT)
Dept: ONCOLOGY | Facility: CLINIC | Age: 46
End: 2021-03-30

## 2021-03-30 NOTE — TELEPHONE ENCOUNTER
Patient called triage line reporting she has been been a patient of Dr. Romano and was treated three years ago. Reporting she had her vaccine few days back and she has been having a lot of pain and numbness in feet.     Upon calling patient back discussed with Kathryn RESENDIZ patient's complaint of hip pain and being tired. Called patient and discussed with her how long ago was it that patient had vaccine. Patient reports a week ago today she had her shot. Reporting that she has woke up last few days with feeling really tired both hips pain and feet numbness. Stating it actually feels like when I was taking chemotherapy. I just feel bad day long. Informing patient that Kathryn RESENDIZ wants patient to alternate with Tylenol and Ibuprofen and to let us know how she is feeling in week. Informing her that symptoms may improve the further time away from injection. Patient stating she understood and would let us know.

## 2021-09-08 ENCOUNTER — TELEPHONE (OUTPATIENT)
Dept: ONCOLOGY | Facility: CLINIC | Age: 46
End: 2021-09-08

## 2021-09-08 NOTE — TELEPHONE ENCOUNTER
Provider: ERIC    Caller:ESPERANZA    Relationship to Patient: SELF    Phone Number:[phone]    Reason for Call: PT NEEDED TO RESCHEDULE APPT FROM 9/9 TO 9/23 AT 8:00 AM. RESCHEDULE APPT AND ADVISE PT.

## 2021-09-09 ENCOUNTER — TELEMEDICINE (OUTPATIENT)
Dept: FAMILY MEDICINE CLINIC | Facility: TELEHEALTH | Age: 46
End: 2021-09-09

## 2021-09-09 VITALS — TEMPERATURE: 98 F

## 2021-09-09 DIAGNOSIS — K52.9 GASTROENTERITIS: Primary | ICD-10-CM

## 2021-09-09 PROCEDURE — U0004 COV-19 TEST NON-CDC HGH THRU: HCPCS | Performed by: NURSE PRACTITIONER

## 2021-09-09 PROCEDURE — 99213 OFFICE O/P EST LOW 20 MIN: CPT | Performed by: NURSE PRACTITIONER

## 2021-09-09 NOTE — PATIENT INSTRUCTIONS
Order has been placed for SARS-CoV-2 (Coronavirus) test to be done at your nearest [facility]. You don't need to call the Urgent Care, just let them know when you arrive that you have been assessed by a Virtual Care Provider and that you have an order for testing. We will call with results when they are available. The results will be released to your [facility]ryan. A [facility] message will also be sent after the first attempted call to notify you that your test results are available. Continue to treat your symptoms just as you would for any viral illness. Take Tylenol and/or Ibuprofen for pain and/or fever, you may find it better to alternate these every 4 hours, so that you are only taking each every 8 hours. Stay hydrated, rest and you may treat cough with over-the-counter cough syrup such as Robitussin. You will need to Self Quarantine (instructions are being sent to [facility]) until the following criteria has been met:  --it has been 10 days from onset of symptoms  --minimum of 24 hours fever free without fever reducing medication  --AND improvement of symptoms  Continue to wear a mask for 14 days or until symptoms resolve. If symptoms worsen, go to Urgent Care or Emergency Department for care.  10 Things You Can Do to Manage Your COVID-19 Symptoms at Home  If you have possible or confirmed COVID-19:  1. Stay home except to get medical care.  2. Monitor your symptoms carefully. If your symptoms get worse, call your healthcare provider immediately.  3. Get rest and stay hydrated.  4. If you have a medical appointment, call the healthcare provider ahead of time and tell them that you have or may have COVID-19.  5. For medical emergencies, call 911 and notify the dispatch personnel that you have or may have COVID-19.  6. Cover your cough and sneezes with a tissue or use the inside of your elbow.  7. Wash your hands often with soap and water for at least 20 seconds or clean your hands with an alcohol-based hand   that contains at least 60% alcohol.  8. As much as possible, stay in a specific room and away from other people in your home. Also, you should use a separate bathroom, if available. If you need to be around other people in or outside of the home, wear a mask.  9. Avoid sharing personal items with other people in your household, like dishes, towels, and bedding.  10. Clean all surfaces that are touched often, like counters, tabletops, and doorknobs. Use household cleaning sprays or wipes according to the label instructions.  cdc.gov/coronavirus  07/16/2021  This information is not intended to replace advice given to you by your health care provider. Make sure you discuss any questions you have with your health care provider.  Document Revised: 08/04/2021 Document Reviewed: 08/04/2021  [employer] Patient Education © 2021 [employer]

## 2021-09-09 NOTE — PROGRESS NOTES
Estefany Bryan  1975    Chief Complaint   Patient presents with   • Nausea     low grade fever of 99 yesterday   • Diarrhea       HPI  Estefany Bryan is a 46 y.o. female with complaints of nausea, diarrhea and fever since yesterday. Her daughter has similar symptoms. They were at a volleyball game last weekend and were sitting near people that were talking about being sick and she is afraid that she may have Covid. She wants to be tested before she goes back to work. She has not had fever today.        The following portions of the patient's history were reviewed and updated as appropriate: allergies, current medications, past social history and problem list.    Past Medical History:   Diagnosis Date   • Anxiety    • Arthritis     IN SI JOINT   • Breast cancer, right (CMS/HCC) 1/10/2018    s/p bilateral mastectomy; chemo and radiation as well    • Depression    • Dizzy    • GERD (gastroesophageal reflux disease) 01/23/2018    chemo related    • H/O Bell's palsy     oct 25 2015 or 2016   • High blood pressure    • History of chemotherapy     last therapy5/2018   • History of kidney stones    • History of radiation therapy    • Itchy eyes     bilat - from bells palsy    • Low back pain     DUE TO ARTHRITIS   • Migraine     HAS NOT HAD A MIGRAINE IN A YEAR   • Muscle ache     all over    • SOBOE (shortness of breath on exertion)    • Tachycardia    • Tattoos     BACK AND LEFT HIP   • Wears glasses      Social History     Socioeconomic History   • Marital status:      Spouse name: Not on file   • Number of children: Not on file   • Years of education: Not on file   • Highest education level: Not on file   Tobacco Use   • Smoking status: Former Smoker     Packs/day: 0.50     Years: 20.00     Pack years: 10.00     Types: Electronic Cigarette, Cigarettes     Quit date: 2011     Years since quitting: 10.6   • Smokeless tobacco: Never Used   • Tobacco comment: quit vaping 4/15/20; quit cigarettes  in 2011   Vaping Use   • Vaping Use: Former   • Start date: 1/1/2011   • Quit date: 4/15/2020   Substance and Sexual Activity   • Alcohol use: No   • Drug use: No   • Sexual activity: Defer     Partners: Male       REVIEW OF SYSTEMS  History obtained from the patient  General ROS: negative for - chills or fever  ENT ROS: negative  Respiratory ROS: no cough, shortness of breath, or wheezing    PHYSICAL EXAM  Temp 98 °F (36.7 °C)   LMP 11/29/2017 Comment: last mammogram 2018    CONSTITUTIONAL:alert, well appearing, and in no distress  CHEST:respiratory effort normal  PSYCH:alert, normal affect and speech     Diagnoses and all orders for this visit:    1. Gastroenteritis (Primary)  -     COVID-19 PCR, [facility], NP SWAB IN LEXAR VIRAL TRANSPORT MEDIA/ORAL SWISH 24-30 HR TAT - Swab, Nasopharynx; [id number] - [place], [place] - [address] AT [facility] - [phone] PH - [phone] FX  [address] [place] [address]  Phone: [phone] Fax: [phone]      This visit was performed via Telehealth.    Order has been placed for SARS-CoV-2 (Coronavirus) test to be done at your nearest [facility]. You don't need to call the Urgent Care, just let them know when you arrive that you have been assessed by a Virtual Care Provider and that you have an order for testing. We will call with results when they are available. The results will be released to your [facility]. A [facility] message will also be sent after the first attempted call to notify you that your test results are available. Continue to treat your symptoms just as you would for any viral illness. Take Tylenol and/or Ibuprofen for pain and/or fever, you may find it better to alternate these every 4 hours, so that you are only taking each every 8 hours. Stay hydrated, rest and you may treat cough with over-the-counter cough syrup such as Robitussin. You will need to Self Quarantine  (instructions are being sent to [facility]) until the following criteria has been met:  --it has been 10 days from onset of symptoms  --minimum of 24 hours fever free without fever reducing medication  --AND improvement of symptoms  Continue to wear a mask for 14 days or until symptoms resolve. If symptoms worsen, go to Urgent Care or Emergency Department for care.      Teresa Dunne, APRN  09/09/21  11:29 EDT

## 2021-11-04 ENCOUNTER — LAB (OUTPATIENT)
Dept: LAB | Facility: HOSPITAL | Age: 46
End: 2021-11-04

## 2021-11-04 ENCOUNTER — OFFICE VISIT (OUTPATIENT)
Dept: ONCOLOGY | Facility: CLINIC | Age: 46
End: 2021-11-04

## 2021-11-04 VITALS
RESPIRATION RATE: 16 BRPM | TEMPERATURE: 96 F | DIASTOLIC BLOOD PRESSURE: 87 MMHG | SYSTOLIC BLOOD PRESSURE: 129 MMHG | BODY MASS INDEX: 32.74 KG/M2 | WEIGHT: 216 LBS | OXYGEN SATURATION: 98 % | HEIGHT: 68 IN | HEART RATE: 83 BPM

## 2021-11-04 DIAGNOSIS — Z17.1 MALIGNANT NEOPLASM OF RIGHT BREAST IN FEMALE, ESTROGEN RECEPTOR NEGATIVE, UNSPECIFIED SITE OF BREAST (HCC): ICD-10-CM

## 2021-11-04 DIAGNOSIS — C50.911 MALIGNANT NEOPLASM OF RIGHT BREAST IN FEMALE, ESTROGEN RECEPTOR NEGATIVE, UNSPECIFIED SITE OF BREAST (HCC): ICD-10-CM

## 2021-11-04 DIAGNOSIS — C50.011 MALIGNANT NEOPLASM OF AREOLA OF RIGHT BREAST IN FEMALE, ESTROGEN RECEPTOR NEGATIVE (HCC): Primary | ICD-10-CM

## 2021-11-04 DIAGNOSIS — Z17.1 MALIGNANT NEOPLASM OF AREOLA OF RIGHT BREAST IN FEMALE, ESTROGEN RECEPTOR NEGATIVE (HCC): Primary | ICD-10-CM

## 2021-11-04 LAB
ALBUMIN SERPL-MCNC: 4.7 G/DL (ref 3.5–5.2)
ALBUMIN/GLOB SERPL: 1.7 G/DL
ALP SERPL-CCNC: 133 U/L (ref 39–117)
ALT SERPL W P-5'-P-CCNC: 26 U/L (ref 1–33)
ANION GAP SERPL CALCULATED.3IONS-SCNC: 13 MMOL/L (ref 5–15)
AST SERPL-CCNC: 16 U/L (ref 1–32)
BILIRUB SERPL-MCNC: 0.4 MG/DL (ref 0–1.2)
BUN SERPL-MCNC: 16 MG/DL (ref 6–20)
BUN/CREAT SERPL: 17.6 (ref 7–25)
CALCIUM SPEC-SCNC: 9.8 MG/DL (ref 8.6–10.5)
CHLORIDE SERPL-SCNC: 103 MMOL/L (ref 98–107)
CO2 SERPL-SCNC: 24 MMOL/L (ref 22–29)
CREAT SERPL-MCNC: 0.91 MG/DL (ref 0.57–1)
ERYTHROCYTE [DISTWIDTH] IN BLOOD BY AUTOMATED COUNT: 13.8 % (ref 12.3–15.4)
GFR SERPL CREATININE-BSD FRML MDRD: 67 ML/MIN/1.73
GLOBULIN UR ELPH-MCNC: 2.8 GM/DL
GLUCOSE SERPL-MCNC: 94 MG/DL (ref 65–99)
HCT VFR BLD AUTO: 44.2 % (ref 34–46.6)
HGB BLD-MCNC: 14.5 G/DL (ref 12–15.9)
LYMPHOCYTES # BLD AUTO: 2.1 10*3/MM3 (ref 0.7–3.1)
LYMPHOCYTES NFR BLD AUTO: 35.7 % (ref 19.6–45.3)
MCH RBC QN AUTO: 29.7 PG (ref 26.6–33)
MCHC RBC AUTO-ENTMCNC: 32.9 G/DL (ref 31.5–35.7)
MCV RBC AUTO: 90.4 FL (ref 79–97)
MONOCYTES # BLD AUTO: 0.3 10*3/MM3 (ref 0.1–0.9)
MONOCYTES NFR BLD AUTO: 5 % (ref 5–12)
NEUTROPHILS NFR BLD AUTO: 3.6 10*3/MM3 (ref 1.7–7)
NEUTROPHILS NFR BLD AUTO: 59.3 % (ref 42.7–76)
PLATELET # BLD AUTO: 315 10*3/MM3 (ref 140–450)
PMV BLD AUTO: 8.2 FL (ref 6–12)
POTASSIUM SERPL-SCNC: 3.8 MMOL/L (ref 3.5–5.2)
PROT SERPL-MCNC: 7.5 G/DL (ref 6–8.5)
RBC # BLD AUTO: 4.88 10*6/MM3 (ref 3.77–5.28)
SODIUM SERPL-SCNC: 140 MMOL/L (ref 136–145)
WBC # BLD AUTO: 6 10*3/MM3 (ref 3.4–10.8)

## 2021-11-04 PROCEDURE — 85025 COMPLETE CBC W/AUTO DIFF WBC: CPT

## 2021-11-04 PROCEDURE — 99213 OFFICE O/P EST LOW 20 MIN: CPT | Performed by: INTERNAL MEDICINE

## 2021-11-04 PROCEDURE — 36415 COLL VENOUS BLD VENIPUNCTURE: CPT

## 2021-11-04 PROCEDURE — 80053 COMPREHEN METABOLIC PANEL: CPT

## 2021-11-04 RX ORDER — DULOXETIN HYDROCHLORIDE 60 MG/1
60 CAPSULE, DELAYED RELEASE ORAL DAILY
COMMUNITY
Start: 2021-10-06

## 2021-11-04 NOTE — PROGRESS NOTES
"      PROBLEM LIST:  1. 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  Biopsy showed high grade invasive ductal carcinoma, ER negative, W[place] negative, HER-2 negative.  The mass on imaging measures approximately 9.7 cm.  Enlarged abnormal lymph nodes are present on imaging.  FNA of 1 axillary lymph node was negative for malignancy.  PET/CT on 1/12/18 showed large necrotic right breast mass, 2 pathologically enlarged and hypermetabolic intramammary nodes, and no evidence of distant disease.  Breast MRI on 1/17/18 showed evidence of pectoral muscle involvement, as well as several satellite lesions.  B) neoadjuvant ddAC followed by weekly taxol started on 1/23/18.  Taxol stopped after 10 doses due to persistent tachycardia  C) bilateral mastectomy on 6/21/18.  Residual 5 mm high grade IDC in the right breast.  0/4 LN involved.  [id number]  D) adjuvant radiation completed 8/27/18.  2.  Hypertension  3.  Anxiety  4.  Bell's palsy  5. Sinus tachycardia    Subjective      CC: breast cancer    HISTORY OF PRESENT ILLNESS:   Estefany Bryan returns today for follow-up.  She says she always gets anxious for her appointments.  She does not have any new symtoms. She does have some chronic anterior thigh pain - it started prior to diagnosis but was exacerbated during chemotherapy.  It doesn't limit her activities, but is there.  It is worse after walking and at the end of the day.        Objective      /87   Pulse 83   Temp 96 °F (35.6 °C) (Temporal)   Resp 16   Ht 172.7 cm (67.99\")   Wt 98 kg (216 lb)   LMP 11/29/2017 Comment: last mammogram 2018  SpO2 98%   BMI 32.85 kg/m²      Performance Status: 0    General: well appearing female in no acute distress  Neuro: alert and oriented  HEENT: sclera anicteric, oropharynx clear  Lymphatics: no cervical, supraclavicular adenopathy. No palpable " axillary adenopathy  Cardiovascular: regular rate and rhythm, no murmurs  Lungs: clear to auscultation bilaterally  Abdomen: soft, nontender, nondistended.  No palpable organomegaly  Extremeties: no lower extremity edema  Skin: no rashes, lesions, bruising, or petechiae  Psych: mood and affect appropriate    Labs:  Lab Results   Component Value Date    WBC 6.00 11/04/2021    HGB 14.5 11/04/2021    HCT 44.2 11/04/2021    MCV 90.4 11/04/2021     11/04/2021     Lab Results   Component Value Date    GLUCOSE 134 (H) 10/05/2020    BUN 11 10/05/2020    CREATININE 0.85 10/05/2020    EGFRIFNONA 72 10/05/2020    BCR 12.9 10/05/2020    K 3.9 10/05/2020    CO2 24.4 10/05/2020    CALCIUM 10.1 10/05/2020    ALBUMIN 4.60 10/05/2020    LABIL2 1.4 08/01/2015    AST 25 10/05/2020    ALT 36 (H) 10/05/2020       Assessment/Plan   Estefany Bryan is a 46 y.o. year old female with [id number] triple negative breast cancer who returns for follow up.   She is doing well with no evidence of disease recurrence.      Anterior thigh pain: offered referral to PT.  She has had benefit from SI joint injection in the past.    We will continue to follow her every 6 months until 5 years from completion of treatment.                    Winsome Romano MD  [facility]    11/4/2021          CC:

## 2022-01-20 ENCOUNTER — TELEMEDICINE (OUTPATIENT)
Dept: FAMILY MEDICINE CLINIC | Facility: TELEHEALTH | Age: 47
End: 2022-01-20

## 2022-01-20 ENCOUNTER — E-VISIT (OUTPATIENT)
Dept: FAMILY MEDICINE CLINIC | Facility: TELEHEALTH | Age: 47
End: 2022-01-20

## 2022-01-20 DIAGNOSIS — J98.9 RESPIRATORY ILLNESS: ICD-10-CM

## 2022-01-20 DIAGNOSIS — H92.02 OTALGIA, LEFT: Primary | ICD-10-CM

## 2022-01-20 PROCEDURE — 99213 OFFICE O/P EST LOW 20 MIN: CPT | Performed by: NURSE PRACTITIONER

## 2022-01-20 RX ORDER — DEXTROMETHORPHAN HYDROBROMIDE AND PROMETHAZINE HYDROCHLORIDE 15; 6.25 MG/5ML; MG/5ML
5 SYRUP ORAL 4 TIMES DAILY PRN
Qty: 140 ML | Refills: 0 | Status: SHIPPED | OUTPATIENT
Start: 2022-01-20 | End: 2022-01-27

## 2022-01-20 RX ORDER — AZITHROMYCIN 250 MG/1
TABLET, FILM COATED ORAL
Qty: 6 TABLET | Refills: 0 | OUTPATIENT
Start: 2022-01-20 | End: 2022-08-18

## 2022-01-20 RX ORDER — ALBUTEROL SULFATE 90 UG/1
2 AEROSOL, METERED RESPIRATORY (INHALATION) EVERY 4 HOURS PRN
Qty: 18 G | Refills: 0 | Status: SHIPPED | OUTPATIENT
Start: 2022-01-20 | End: 2022-01-30

## 2022-01-20 NOTE — E-VISIT ESCALATED
Patient escalated   Provider Sarah Max chose to escalate patient to another level of care because: Patient needs a lab test   Patient was sent the following message:   Based on the information you've provided us, you need to take another step to get care.   What to do now:    Please set up a video visit  .   You won't be charged for your eVisit. If you paid with a credit card, the charge will be reversed.   Chief Complaint: Coronavirus (COVID-19), cold, sinus pain, allergy, or flu   Patient introduction   Patient is 46-year-old female who reports cough, shortness of breath, congestion, voice hoarseness, headache, fatigue, and diarrhea that started < 48 hours ago.   Coronavirus Disease 2019 (COVID-19) exposure, testing history, vaccination status, and vaccine injection site symptoms:    Close contact with a person with a confirmed case of COVID-19.    Exposure was 3 to 7 days ago.    No recent travel outside of their local community.    Patient had a viral lab test > 3 months ago. Test result was negative.    Reports receiving 2 doses of the COVID-19 vaccine.    Received the Moderna vaccine for the first dose.    Received the Moderna vaccine for the second dose.    Received their most recent dose of the vaccine > 14 days ago.   Warning. The following may warrant further investigation:    Reports confirmed exposure to COVID-19    Hypertension    Asthma    BMI of 30 to 39   When asked why they're seeking care online today, patient reports they want to get tested for COVID-19.   Patient reports they were directed by their employer to get a COVID-19 test.   Patient-submitted comments:   Patient writes: Overall feel achy, stuffy, coughing and have been exposed to COVID.   Patient requests a 2-day excuse note.   General presentation   Symptoms came on suddenly.   Fever:    Denies fever.   Sinus and nasal symptoms:    Reports congestion with sinus pain or pressure on or around their forehead, eyes, nose, and upper  teeth or jaw.    Patient first noticed sinus pain < 5 days ago.    Denies rhinitis.    Denies itchy nose or sneezing.    Denies nasal drainage.    Denies postnasal drip.    Denies history of unhealed nasal septal ulcer/nasal wound.    Denies antibiotic treatment for sinus infection in the last year.    Denies history of deviated septum or nasal polyps.   Sore throat:    Reports mild hoarseness. Patient doesn't believe hoarseness is due to voice strain.    Denies sore throat.   Head and body aches:    Reports headache, described as moderate (4-6 on a scale of 1-10).    Reports fatigue.    Denies sweats.    Denies chills.    Denies myalgia.   Dizziness:    Reports mild dizziness that does not interfere with daily activities.   Cough:    Reports cough.    Cough is worse in the morning, during the day, and at night/while sleeping.    Cough is not productive of sputum.   Wheezing and SOB:    Reports having asthma.    Reports wheezing.    Reports moderate shortness of breath that does not affect ADLs. Despite shortness of breath, patient can speak normally. Despite shortness of breath, patient can take a full, deep breath (inhaling for 3-4 seconds).    Denies using medications/inhalers for asthma.    Denies COPD diagnosis.    Denies current cold symptoms aggravate their asthma.   Chest pain:    Reports chest pain.    Pain is not reproducible with palpation.    Denies crushing or sharp and stabbing pain. Denies pain spreads to shoulders, neck, arms, or jaw. Denies diaphoresis. Denies pain feels cardiogenic.    Denies pain is exacerbated by emotional stress, exertion/exercise, or respiration.    Denies history of angina, coronary artery disease, occlusive vascular disease/peripheral artery disease, myocardial infarction, cerebrovascular disease, or transient ischemic attack.    [place] Heart Score (MHS): 1, low risk of CAD. Assigning 1 point to each of 5 criteria (female >= 65 years old or male >= 55 years, known CAD, pain  worse with exercise, pain not reproducible with palpation, and patient assumes pain is cardiac), the MHS is a validated clinical decision rule used to rule out coronary artery disease in primary care patients with chest pain.   Allergies:    Reports history of allergies.    Patient has known seasonal allergies.   Flu exposure:    Reports recent exposure to confirmed distant-proximity flu diagnosis.    Denies receiving a flu vaccine this season.   Patient denies the following red flags:    Severe shortness of breath    Inability to repeat a sentence without stopping for breath    Inability to take a full breath lasting 3-4 seconds    Changes in alertness or awareness    Symptoms suggesting intracranial hemorrhage    Decreased urination   Pregnancy/menstrual status/breastfeeding:    Patient is postmenopausal   Self-exam:    No difficulty moving their chin toward their chest    Neck lymph nodes feel normal    Denies antibiotic treatment for similar symptoms within the past month   Current medications   Reports taking over-the-counter medication for current symptoms. Patient has taken acetaminophen, fluticasone, ibuprofen, and omeprazole.   Reports taking duloxetine 30 MG [Cymbalta].   Medication allergies    Guaifenesin    Guaifenesin/dextromethorphan    Phenylephrine   Medication contraindication review   Reports history positive for depression and hypertension. Therefore, the following medication(s) will not be prescribed:    Metoclopramide    Acetaminophen-diphenhydramine-phenylephrine    Aspirin-chlorpheniramine-phenylephrine   Denies history of metoclopramide-associated dystonic reaction and tardive dyskinesia.   No known history of amoxicillin-clavulanate-associated cholestatic jaundice or hepatic impairment.   No known history of azithromycin-associated cholestatic jaundice or hepatic impairment.   Past medical history   Immune conditions: Denies immunocompromising conditions. Patient is in remission from  cancer. Reports receiving chemotherapy and radiation therapy for cancer.   Social history   Former smoker.   Assessment:   Patient determined to need a level of care not appropriate to be delivered through eVisit.   Plan:   Patient informed of need to seek in-person care      ----------   Electronically signed by ASTRID Barnett on 2022-01-20 at 17:43PM   ----------   Patient Interview Transcript:   Why are you getting care through eVisit today? We can't guarantee a specific treatment or test. Your provider will decide what's best for you. Select all that apply.    I want to be tested for COVID-19   Not selected:    I want a specific treatment or medication    I want to know if I have a cold or something more serious    I want to know if I need to be seen by a provider    I need a doctor's note    I want to get the COVID-19 vaccine    I think I'm having side effects from the COVID-19 vaccine    I just want to feel better!    None of the above   Do any of these statements apply to you? Select all that apply.    My employer directed me to get a COVID-19 test   Not selected:    The public health department directed me to get a COVID-19 test    I need to get a COVID-19 test BEFORE I travel    I need to get a COVID-19 test AFTER traveling in the last week    None of the above   Which of these symptoms are bothering you? Select all that apply.    Cough    Shortness of breath    Stuffed-up nose or sinuses    Hoarse voice or loss of voice    Headache    Fatigue or tiredness    Diarrhea   Not selected:    Fever    Runny nose    Itchy or watery eyes    Itchy nose or sneezing    Loss of smell or taste    Sore throat    Sweats    Chills    Muscle or body aches    Nausea or vomiting    I don't have any of these symptoms   Before we learn more about why you're here, we'll get some information related to COVID-19. We'll ask about risk factors, testing, vaccination status, vaccine injection site symptoms, and exposure. Do you  have any of these conditions? If so, you may be at increased risk for complications from COVID-19. Select all that apply.    None of the above   Not selected:    Chronic lung disease, such as cystic fibrosis or interstitial fibrosis    Heart disease, such as congenital heart disease, congestive heart failure, or coronary artery disease    Disorder of the brain, spinal cord, or nerves and muscles, such as dementia, cerebral palsy, epilepsy, muscular dystrophy, or developmental delay    Metabolic disorder or mitochondrial disease    Cerebrovascular disease, such as stroke or another condition affecting the blood vessels or blood supply to the brain   Do you live in a group care setting? Examples include: - Nursing home - Residential care - Psychiatric treatment facility - Group home - [facility] - Board and care home - Homeless shelter - Foster care setting Select one.    No   Not selected:    Yes   Have you ever been tested for COVID-19? Select one.    Yes   Not selected:    No   When was your most recent COVID-19 test? Select one.    More than 3 months ago   Not selected:    Today    Yesterday    2 to 4 days ago    5 to 7 days ago    7 to 14 days ago    15 to 30 days ago    1 to 3 months ago   What type of COVID-19 test did you most recently have? There are two types of COVID-19 tests: - Viral tests check if you're currently infected with COVID-19. For these tests, a nose swab or saliva sample is taken. Viral tests include self-tests and tests done at a doctor's office, lab, or testing site. - Antibody tests check if you've been infected in the past. For these tests, your blood is drawn. Antibody tests can only be done at a doctor's office, lab, or testing site. Select one.    Viral test at a doctor's office, lab, or testing site   Not selected:    Viral self-test    Antibody test   What was the result of your most recent COVID-19 test? Select one.    Negative   Not selected:    Positive    I'm not sure   Have you  "gotten the COVID-19 vaccine? Select one.    Yes   Not selected:    No   How many doses of the COVID-19 vaccine have you gotten? This includes boosters. Select one.    2 doses   Not selected:    1 dose    3 doses   Which COVID-19 vaccine did you get for your first dose? Check your Vaccination Record Card under Product Name/. Select one.    Moderna   Not selected:    Bernabe & Bernabe's Colin Vaccine (J&J/Colin)    Pfizer-BioNTech (Pfizer)   Which COVID-19 vaccine did you get for your second dose? Check your Vaccination Record Card under Product Name/. Select one.    Moderna   Not selected:    Bernabe & Bernabe's Colin Vaccine (J&J/Colin)    Pfizer-BioNTech (Pfizer)   When did you get your most recent dose of the COVID-19 vaccine?    More than 14 days ago   Not selected:    Less than 48 hours (2 days) ago    48 to 72 hours (3 days) ago    3 to 5 days ago    5 to 7 days ago    7 to 14 days ago   In the last 14 days, have you traveled outside of your local community? This includes travel by car, RV, bus, train, or plane. Travel increases your chances of getting and spreading COVID-19. Select one.    No   Not selected:    Yes   In the last 14 days, have you had close contact with someone who has coronavirus (COVID-19)? \"Close contact\" means any of these: - Living in the same household as someone with COVID-19. - Caring for someone with COVID-19. - Being within 6 feet of someone with COVID-19 for a total of at least 15 minutes over a 24-hour period. For example, three 5-minute exposures for a total of 15 minutes. - Being in direct contact with respiratory droplets from someone with COVID-19 (being coughed on, kissing, sharing utensils). Select one.    Yes, a confirmed case   Not selected:    Yes, a suspected case    No, not that I know of   When did the close contact happen? Select one.    3 to 7 days ago   Not selected:    Within the last 2 days    More than 7 days ago   Thanks for " "completing our COVID-19 questions. Now we'll return to your symptoms. When did your symptoms start? If you know the exact date your symptoms started, choose Other and enter the month and day. Select one.    Less than 48 hours ago   Not selected:    3 to 5 days ago    6 to 9 days ago    10 to 14 days ago    2 to 4 weeks ago    More than a month ago    Other (specify)   Did your symptoms come on suddenly or gradually? Select one.    Suddenly   Not selected:    Gradually    I'm not sure   How would you describe your shortness of breath? Sometimes shortness of breath can be a sign of a more serious condition. Select one.    Moderate (it's bad, but I can still do simple things like get dressed, bathe, or comb my hair)   Not selected:    Mild (about what I normally have with a cold)    Severe (it's so bad that I can't do simple things like get dressed, bathe, or comb my hair)   We'd like to find out more about your shortness of breath. Try to say the following sentence out loud: \"I went to the store today to buy bread, milk, and eggs.\" Are you able to get to the end of the sentence without stopping for breath? If not, you may need emergency care.    Yes   Not selected:    No   Are you able to take a full, deep breath? A full, deep breath means inhaling for 3 to 4 seconds. If you can't do this, you may need to seek emergency care. Select one.    Yes   Not selected:    No   You mentioned having a headache. On a scale of 1 to 10, how severe is your headache pain? Select one.    Moderate (4 to 6)   Not selected:    Mild (1 to 3)    Severe (7 to 9)    Unbearable (10)    The worst headache of my life (10+)   Do you cough so hard that it's made you gag or vomit? By gag, we mean has your coughing made you choke or dry heave? Select all that apply.    Yes, my coughing has made me gag   Not selected:    Yes, my coughing has made me vomit    No   When is your cough the worst? Select all that apply.    In the morning, or when I wake " "up    During the day    At nighttime, or while I'm sleeping   Not selected:    I'm not sure   Are you coughing up mucus or phlegm? Select one.    No, my cough is dry   Not selected:    Yes, a little    Yes, a lot   You mentioned having a stuffy nose or sinus congestion. Do you feel pain or pressure in your sinuses?    Yes   Not selected:    No    I'm not sure   Where do you feel sinus pain or pressure?    In my forehead    Around my eyes    Behind my nose    In my upper teeth or jaw   Not selected:    In my cheeks    I'm not sure   When did you first notice your sinus pain or pressure? Select one.    Less than 5 days ago   Not selected:    5 to 9 days ago    10 to 14 days ago    2 to 4 weeks ago    1 month ago or longer   Does coughing, sneezing, or leaning forward make your sinuses feel worse? Select one.    I'm not sure   Not selected:    Yes    No   What color is your nasal drainage? Select one.    My nose is stuffed but not draining or running   Not selected:    Clear    White    Yellow    Green    I'm not sure   Is there any drainage (mucus) going down the back of your throat? This kind of drainage is also called \"postnasal drip.\" Select one.    No   Not selected:    Yes    I'm not sure   Since your symptoms started, have you felt dizzy? Select one.    Yes, but I can continue with my regular daily activities   Not selected:    Yes, and it makes it hard to stand, walk, or do daily activities    No   Do you have chest pain? You might also feel it as discomfort, aching, tightness, or squeezing in the chest. Select one.    Yes   Not selected:    No   Which of these is true of your chest pain? Select one.    My chest hurts even when I'm not coughing   Not selected:    My chest hurts only when I cough   Which of these are true of your chest pain?    None of the above   Not selected:    It feels like it's spreading into my shoulders, neck, arms, or jaw    It feels like crushing pressure on my chest    It feels like a " "sharp, stabbing pain    It feels like it's coming from my heart    It makes me sweat a lot more than usual   Do any of these make your chest pain worse? Select all that apply.    None of the above   Not selected:    Emotional stress    Exertion or exercise    Taking a deep breath   Gently press on your chest with the palm of your hand. Does your chest pain feel worse? Select one.    No   Not selected:    Yes   Have you ever had any of these conditions? Select all that apply.    None of the above   Not selected:    Angina    Blocked arteries in the heart    Blocked arteries in the legs    Heart attack    Stroke (or \"mini-stroke\")   Have you urinated at least 3 times in the last 24 hours? Select one.    Yes   Not selected:    No    I'm not sure   Changes in alertness or awareness may mean you need emergency care. Since your symptoms started, have you had any of these? Select all that apply.    None of the above   Not selected:    Confusion    Slurred speech    Not knowing where you are or what day it is    Difficulty staying conscious    Fainting or passing out   Do your symptoms include a whistling sound, or wheezing, when you breathe? Select one.    Yes   Not selected:    No    I'm not sure   Early in this interview, you told us you were hoarse or you'd lost your voice. How would you describe the changes to your voice? Select one.    It just sounds a little raspy   Not selected:    It's harder than usual to talk    I can barely talk at all   Is it possible that you strained your voice? Singing, yelling, or talking more or louder than usual can cause voice strain. Select one.    No   Not selected:    Yes    I'm not sure   Do you have any of these symptoms in your ear(s)? Select all that apply.    Pressure   Not selected:    Pain    Fullness    Crackling or popping    Plugged or blocked sensation    None of the above   Can you move your chin toward your chest?    Yes   Not selected:    No, my neck is too stiff   Are " your glands/lymph nodes swollen, or does it hurt when you touch them?    No   Not selected:    Yes    I'm not sure   People with a very high body mass index (BMI) are at higher risk for developing complications from the flu and severe illness from COVID-19. To determine your BMI, we need to know your weight and height. Please enter your weight (in pounds).    Weight   Please enter your height.    Height   In the past week, has anyone around you (such as at school, work, or home) had a confirmed diagnosis of the flu? A confirmed diagnosis means that a nose swab was done to verify a flu infection. Select all that apply.    I've been in the same building as someone who has the flu   Not selected:    I live with someone who has the flu    I've been within touching distance of someone who has the flu    I've walked by, or sat about 3 feet away from, someone who has the flu    I'm not sure    No   Have you ever been diagnosed with asthma? Select one.    Yes   Not selected:    No   Are your cold symptoms making your asthma worse? Select one.    No   Not selected:    Yes   What medications or inhalers are you currently using for your asthma? Select all that apply.    I'm not taking any medications for my asthma   Not selected:    Albuterol inhaler, as needed (such as ProAir, Proventil, Ventolin)    Inhaled steroid (such as Qvar, Pulmicort, Flovent)    Inhaled steroid with long-acting bronchodilator (such as Advair, Dulera, Symbicort)    I'm not sure    I usually take medications for my asthma, but I ran out   Have you ever been diagnosed with chronic obstructive pulmonary disease (COPD)? Select one.    No   Not selected:    Yes    I'm not sure   In the last year, how many times were you treated with antibiotics for a sinus infection? Select one.    None   Not selected:    1 to 3 times    4 or more times   Have you been diagnosed with a deviated septum or nasal polyps? The nose is divided into two nostrils by the septum. A  crooked septum is called a deviated septum. Nasal polyps are growths inside the nose or sinuses. Select one.    No   Not selected:    Yes, but I had surgery to treat them    Yes, I have a deviated septum    Yes, I have nasal polyps    Yes, I have a deviated septum and nasal polyps    I'm not sure   Do you have a sore inside your nose that won't heal? Select one.    No   Not selected:    Yes    I'm not sure   Do you have allergies (pollen, dust mites, mold, animal dander)? Select one.    Yes   Not selected:    No    I'm not sure   What kind of allergies do you have? Select all that apply.    Seasonal allergies (hay fever)   Not selected:    Pet allergies    Dust allergies    None of the above    I'm not sure   Do you think your symptoms could be allergy-related? Select one.    I'm not sure   Not selected:    Yes    No   Have you had a flu shot this season? Select one.    No   Not selected:    Yes, less than 2 weeks ago    Yes, 2 to 4 weeks ago    Yes, 1 to 3 months ago    Yes, 3 to 6 months ago    Yes, more than 6 months ago    I'm not sure   The flu and COVID-19 can be more serious for people with certain conditions or characteristics. These questions help us figure out if you or anyone you live with is at higher risk for complications from these infections. Do either of these statements apply to you? Select all that apply.    None of the above   Not selected:    I'm  or Native [place]    I'm a healthcare worker   Do you smoke tobacco? Select one.    No, I quit   Not selected:    Yes, every day    Yes, some days    No, never   Some conditions can put you at risk for more serious infections. Do any of these apply to you? Select all that apply.    None of the above   Not selected:    I've been hospitalized within the last 5 days    I have diabetes    I'm in close contact with a child in    Are you currently being treated for any of these conditions? Scroll to see all options. Select all that  apply.    Depression    High blood pressure   Not selected:    Aspirin triad (also known as Samter's triad or ASA triad)    Asthma or hives from taking aspirin or other NSAIDs, such as ibuprofen or naproxen    Blockage or narrowing of the blood vessels of the heart    Blood dyscrasia, such anemia, leukemia, lymphoma, or myeloma    Bone marrow depression    Catecholamine-releasing paraganglioma    Blood clotting disorder    Congenital long QT syndrome    Difficulty urinating or completely emptying your bladder    Uncorrected electrolyte abnormalities    Fungal infection    Gastrointestinal (GI) bleeding    Gastrointestinal (GI) obstruction    G6PD deficiency    Recent heart attack    Irregular heartbeat or heart rhythm    Kidney disease or hemodialysis    Mononucleosis (mono)    Myasthenia gravis    Parkinson's disease    Pheochromocytoma    Reye syndrome    Seizure disorder    Ulcerative colitis    None of the above   Do you have any of these conditions that can affect the immune system? Scroll to see all options. Select all that apply.    None of these   Not selected:    History of bone marrow transplant    Chronic kidney disease    Chronic liver disease (including cirrhosis)    HIV/AIDS    Inflammatory bowel disease (Crohn's disease or ulcerative colitis)    Lupus    Moderate to severe plaque psoriasis    Multiple sclerosis    Rheumatoid arthritis    Sickle cell anemia    Alpha or beta thalassemia    History of solid organ transplant (kidney, liver, or heart)    History of spleen removal    An autoimmune disorder not listed here    A condition requiring treatment with long-term use of oral steroids (such as prednisone, prednisolone, or dexamethasone)   Have you ever been diagnosed with cancer? Select one.    Yes, but I'm in remission   Not selected:    Yes, I have cancer now    No   What kind of cancer treatment are you getting? Select all that apply.    Chemotherapy    Radiation therapy   Not selected:     Immunotherapy    Hormone therapy, such as Tamoxifen, Arimidex, or Femara    A treatment not listed here    I'm getting treatment, but I don't know what it is    None   Have you ever had either of these conditions? Select all that apply.    No   Not selected:    Metoclopramide-associated dystonic reaction    Tardive dyskinesia   Do any of these apply to the people who live with you? Select all that apply.    None of the above   Not selected:    A child under the age of 5    An adult 65 or older    A person who is pregnant    A person who has given birth, had a miscarriage, had a pregnancy loss, or had an  in the last 2 weeks    An  or [place]   Does any member of your household have any of these medical conditions? Select all that apply.    None of the above   Not selected:    Asthma    Disorders of the brain, spinal cord, or nerves and muscles, such as dementia, cerebral palsy, epilepsy, muscular dystrophy, or developmental delay    Chronic lung disease, such as COPD or cystic fibrosis    Heart disease, such as congenital heart disease, congestive heart failure, or coronary artery disease    Cerebrovascular disease, such as stroke or another condition affecting the blood vessels or blood supply to the brain    Blood disorders, such as sickle cell disease    Diabetes    Metabolic disorders such as inherited metabolic disorders or mitochondrial disease    Kidney disorders    Liver disorders    Weakened immune system due to illness or medications such as chemotherapy or steroids    Children under the age of 19 who are on long-term aspirin therapy    Extreme obesity (BMI > 40)   Have you gone through menopause? Select one.    Yes   Not selected:    No   Just a few more questions about medications, and then you're finished. Have you used any non-prescription medications or nasal sprays for your current symptoms? Examples include saline sprays, decongestants, NyQuil, and Tylenol. Select  one.    Yes   Not selected:    No   Which of these non-prescription medications have you tried? Scroll to see all options. Select all that apply.    Acetaminophen (Tylenol)    Fluticasone (Flonase)    Ibuprofen (Advil, Motrin, Midol)    Omeprazole (Prilosec)   Not selected:    Budesonide (Rhinocort)    Cetirizine (Zyrtec)    Chlorpheniramine (Aller-chlor, Chlor-Trimeton)    Cromolyn (NasalCrom)    Dextromethorphan (Delsym, Robitussin, Vicks DayQuil Cough)    Diphenhydramine (Benadryl)    Fexofenadine (Allegra)    Guaifenesin (Mucinex)    Guaifenesin/dextromethorphan (Delsym DM, Mucinex DM, Robitussin DM)    Ketotifen (Alaway, Zaditor)    Loratadine (Alavert, Claritin)    Naphazoline-pheniramine (Naphcon-A, Opcon-A, Visine-A)    Oxymetazoline (Afrin)    Phenylephrine (Sudafed)    Triamcinolone (Nasacort)    None of the above   In the past month, have you taken antibiotics for similar symptoms? Examples of antibiotics include amoxicillin, amoxicillin-clavulanate (Augmentin), penicillin, cefdinir (Omnicef), doxycycline, and clindamycin (Cleocin). Select one.    No   Not selected:    Yes    I'm not sure   Have you taken any monoamine oxidase inhibitor (MAOI) medications in the last 14 days? Examples include rasagiline (Azilect), selegiline (Eldepryl, Zelapar), isocarboxazid (Marplan), phenelzine (Nardil), and tranylcypromine (Parnate). Select one.    No   Not selected:    Yes    I'm not sure   Do you take Kynmobi or Apokyn (apomorphine)? Select one.    No   Not selected:    Yes    I'm not sure   Are you taking any other medications or supplements? On the next screen, you need to list all vitamins, supplements, non-prescription medications (such as aspirin or Aleve), and prescription medications that you're taking. Select one.    Yes   Not selected:    Yes, but I'm not sure what they are    No   Have you ever had an allergic or bad reaction to any medication? Select one.    Yes   Not selected:    No   Have you had an  "allergic or bad reaction to any of these medications? Select all that apply.    None of the above   Not selected:    Baloxavir (Xofluza)    Benzonatate (Tessalon Perles)    Fluconazole, itraconazole, or terconazole (brands include Diflucan, Sporanox, Terazol)    Oseltamivir (Tamiflu) or zanamivir (Relenza)    I'm not sure   Have you had an allergic or bad reaction to any of these antibiotic medications? Select all that apply.    None of the above   Not selected:    Penicillin or any \"-cillin\" antibiotic, such as amoxicillin, ampicillin, dicloxacillin, nafcillin, or piperacillin (Brands include Augmentin, Unasyn, and Zosyn)    Tetracycline or any \"-cycline\" antibiotic, such as doxycycline, demeclocycline, minocycline (Brands include Declomycin, Doryx, Dynacin, Oracea, Monodox, Panmycin, and Vibramycin)    Ciprofloxacin or any \"-floxacin\" antibiotic, such as gemifloxacin, levofloxacin, moxifloxacin, or ofloxacin (Brands include Factive, Cipro, Floxin, and Levaquin)    Cephalexin or any \"cef-\" antibiotic, such as cefazolin, cefdinir, cefuroxime, ceftriaxone, ceftazidime, or cefepime (Brands include Ancef, Ceftin, Fortaz, Keflex, Maxipime, Rocephin, and Simplicef)    Azithromycin or any \"-thromycin\" antibiotic, such as erythromycin or clarithromycin (Brands include Biaxin, Erythrocin, Z-daryn, and Zithromax)    Clindamycin or lincomycin (Brands include Cleocin and Lincocin)    I'm not sure   Have you had an allergic or bad reaction to any of these medications? Select all that apply.    None of the above   Not selected:    Albuterol or a similar medication    Corticosteroid (steroid) medication, including topical steroids, inhaled steroids, nasal steroids, or oral steroids (budesonide, ciclesonide, dexamethasone, flunisolide, fluticasone, methylprednisolone, triamcinolone, prednisone (or brand names Alvesco, Deltasone, Flovent, Medrol, Nasacort, Rhinocort, or Veramyst)    Metoclopramide (Reglan)    Ondansetron (Zuplenz, " Zofran ODT, Zofran)    Prochlorperazine (Compazine)    I'm not sure   Have you had an allergic or bad reaction to any of these eye drops or nasal sprays? Scroll to see all options. Select all that apply.    I'm not sure   Not selected:    Azelastine (Astelin, Astepro, Optivar)    Cromolyn (Crolom, NasalCrom)    Ipratropium (Atrovent)    Ketotifen (Alaway, Zaditor)    Pheniramine/naphazoline (Naphcon-A, Opcon-A, Visine-A)    Olopatadine (Pataday, Patanol, Pazeo)    None of the above   Have you had an allergic or bad reaction to any of these non-prescription medications? Scroll to see all options. Select all that apply.    Guaifenesin (Mucinex)    Guaifenesin/dextromethorphan (Delsym DM, Mucinex DM, Robitussin DM)    Phenylephrine (Sudafed)   Not selected:    Acetaminophen (Tylenol)    Aspirin    Cetirizine (Zyrtec)    Chlorpheniramine (Aller-chlor, Chlor-Trimeton)    Dextromethorphan (Delsym, Robitussin, Vicks DayQuil Cough)    Diphenhydramine (Benadryl)    Fexofenadine (Allegra)    Ibuprofen (Advil, Motrin, Midol)    Loratadine (Alavert, Claritin)    Oxymetazoline (Afrin)    I'm not sure    None of the above   Are you allergic to milk or to the proteins found in milk (for example, whey or casein)? A milk allergy is different from lactose intolerance. Select one.    No   Not selected:    Yes    I'm not sure   Have you ever had jaundice or liver problems as a result of taking amoxicillin-clavulanate (Augmentin)? Jaundice is a condition in which the skin and the whites of the eyes turn yellow. Select all that apply.    No, not that I know of   Not selected:    Yes, jaundice    Yes, liver problems   Have you ever had jaundice or liver problems as a result of taking azithromycin (Zithromax, Zmax)? Jaundice is a condition in which the skin and the whites of the eyes turn yellow. Select all that apply.    No, not that I know of   Not selected:    Yes, jaundice    Yes, liver problems   Do you need a doctor's note? A  doctor's note confirms that you received care today and states when you can return to school or work. It does not contain information about your diagnosis or treatment plan. Your provider will make the final decision on whether to give you a doctor's note and for how long. Doctor's notes CANNOT be backdated. We can't provide medical leave paperwork through this type of visit. If more paperwork is needed to request time off, contact your primary care provider. Select one.    Today and tomorrow (2 days)   Not selected:    Today only (1 day)    3 days    7 days    10 days    14 days    No   Is there anything else you'd like to tell us about your symptoms?    Overall feel achy, stuffy, coughing and have been exposed to COVID   ----------   Medical history   Medical history data does not currently exist for this patient.

## 2022-01-21 PROCEDURE — U0004 COV-19 TEST NON-CDC HGH THRU: HCPCS | Performed by: NURSE PRACTITIONER

## 2022-01-21 NOTE — PATIENT INSTRUCTIONS
Earache, Adult  An earache, or ear pain, can be caused by many things, including:  · An infection.  · Ear wax buildup.  · Ear pressure.  · Something in the ear that should not be there (foreign body).  · A sore throat.  · Tooth problems.  · Jaw problems.  Treatment of the earache will depend on the cause. If the cause is not clear or cannot be determined, you may need to watch your symptoms until your earache goes away or until a cause is found.  Follow these instructions at home:  Medicines  · Take or apply over-the-counter and prescription medicines only as told by your health care provider.  · If you were prescribed an antibiotic medicine, use it as told by your health care provider. Do not stop using the antibiotic even if you start to feel better.  · Do not put anything in your ear other than medicine that is prescribed by your health care provider.  Managing pain  If directed, apply heat to the affected area as often as told by your health care provider. Use the heat source that your health care provider recommends, such as a moist heat pack or a heating pad.  · Place a towel between your skin and the heat source.  · Leave the heat on for 20-30 minutes.  · Remove the heat if your skin turns bright red. This is especially important if you are unable to feel pain, heat, or cold. You may have a greater risk of getting burned.  If directed, put ice on the affected area as often as told by your health care provider. To do this:         · Put ice in a plastic bag.  · Place a towel between your skin and the bag.  · Leave the ice on for 20 minutes, 2-3 times a day.    General instructions  · Pay attention to any changes in your symptoms.  · Try resting in an upright position instead of lying down. This may help to reduce pressure in your ear and relieve pain.  · Chew gum if it helps to relieve your ear pain.  · Treat any allergies as told by your health care provider.  · Drink enough fluid to keep your urine pale  yellow.  · It is up to you to get the results of any tests that were done. Ask your health care provider, or the department that is doing the tests, when your results will be ready.  · Keep all follow-up visits as told by your health care provider. This is important.  Contact a health care provider if:  · Your pain does not improve within 2 days.  · Your earache gets worse.  · You have new symptoms.  · You have a fever.  Get help right away if you:  · Have a severe headache.  · Have a stiff neck.  · Have trouble swallowing.  · Have redness or swelling behind your ear.  · Have fluid or blood coming from your ear.  · Have hearing loss.  · Feel dizzy.  Summary  · An earache, or ear pain, can be caused by many things.  · Treatment of the earache will depend on the cause. Follow recommendations from your health care provider to treat your ear pain.  · If the cause is not clear or cannot be determined, you may need to watch your symptoms until your earache goes away or until a cause is found.  · Keep all follow-up visits as told by your health care provider. This is important.  This information is not intended to replace advice given to you by your health care provider. Make sure you discuss any questions you have with your health care provider.  Document Revised: 07/25/2020 Document Reviewed: 07/25/2020  [employer] Patient Education © 2021 [employer]  How to Quarantine at Home  Information for Patients and Families    These instructions are for people with confirmed or suspected COVID-19 who do not need to be hospitalized and those with confirmed COVID-19 who were hospitalized and discharged to care for themselves at home.    If you were tested through the Health Department  The Health Department will monitor your wellbeing.  If it is determined that you do not need to be hospitalized and can be isolated at home, you will be monitored by staff from your local or state health department.     If you were tested through a  Commercial [facility]  You will need to monitor yourself and report changes in your symptoms to your doctor.  See the section below called Monitor Your Symptoms.    Follow these steps until a healthcare provider or local or state health department says you can return to your normal activities.    Stay home except to get medical care  • Restrict activities outside your home, except for getting medical care.   • Do not go to work, school, or public areas.   • Avoid using public transportation, ride-sharing, or taxis.    Separate yourself from other people and animals in your home  People  As much as possible, you should stay in a specific room and away from other people in your home. Also, you should use a separate bathroom, if available.    Animals  You should restrict contact with pets and other animals while you are sick with COVID-19, just like you would around other people. When possible, have another member of your household care for your animals while you are sick. If you are sick with COVID-19, avoid contact with your pet, including petting, snuggling, being kissed or licked, and sharing food. If you must care for your pet or be around animals while you are sick, wash your hands before and after you interact with pets and wear a facemask. See COVID-19 and Animals for more information.    Call ahead before visiting your doctor  If you have a medical appointment, call the healthcare provider and tell them that you have or may have COVID-19. This information will help the healthcare provider’s office take steps to keep other people from getting infected or exposed.    Wear a facemask  You should wear a facemask when you are around other people (e.g., sharing a room or vehicle) or pets and before you enter a healthcare provider’s office.     If you are not able to wear a facemask (for example, because it causes trouble breathing), then people who live with you should not stay in the same room with you, or they should  wear a facemask if they enter your room.    Cover your coughs and sneezes  • Cover your mouth and nose with a tissue when you cough or sneeze.   • Throw used tissues in a lined trash can.   • Immediately wash your hands with soap and water for at least 20 seconds or, if soap and water are not available, clean your hands with an alcohol-based hand  that contains at least 60% alcohol.    Clean your hands often  • Wash your hands often with soap and water for at least 20 seconds, especially after blowing your nose, coughing, or sneezing; going to the bathroom; and before eating or preparing food.     • If soap and water are not readily available, use an alcohol-based hand  with at least 60% alcohol, covering all surfaces of your hands and rubbing them together until they feel dry.    • Soap and water are the best option if hands are visibly dirty. Avoid touching your eyes, nose, and mouth with unwashed hands.    Avoid sharing personal household items  • You should not share dishes, drinking glasses, cups, eating utensils, towels, or bedding with other people or pets in your home.   • After using these items, they should be washed thoroughly with soap and water.    Clean all “high-touch” surfaces everyday  • High touch surfaces include counters, tabletops, doorknobs, bathroom fixtures, toilets, phones, keyboards, tablets, and bedside tables.   • Also, clean any surfaces that may have blood, stool, or body fluids on them.   • Use a household cleaning spray or wipe, according to the label instructions. Labels contain instructions for safe and effective use of the cleaning product, including precautions you should take when applying the product, such as wearing gloves and making sure you have good ventilation during use of the product.    Monitor your symptoms  • Seek prompt medical attention if your illness is worsening (e.g., difficulty breathing).   • Before seeking care, call your healthcare provider  and tell them that you have, or are being evaluated for, COVID-19.   • Put on a facemask before you enter the facility.     • These steps will help the healthcare provider’s office to keep other people in the office or waiting room from getting infected or exposed.   • Persons who are placed under active monitoring or facilitated self-monitoring should follow instructions provided by their local health department or occupational health professionals, as appropriate.  • If you have a medical emergency and need to call 911, notify the dispatch personnel that you have, or are being evaluated for COVID-19. If possible, put on a facemask before emergency medical services arrive.    Discontinuing home isolation  Patients with confirmed COVID-19 should remain under home isolation precautions until the risk of secondary transmission to others is thought to be low. The decision to discontinue home isolation precautions should be made on a case-by-case basis, in consultation with healthcare providers and state and local health departments.    The below content are for household members, intimate partners, and caregivers of a patient with symptomatic laboratory-confirmed COVID-19 or a patient under investigation:    Household members, intimate partners, and caregivers may have close contact with a person with symptomatic, laboratory-confirmed COVID-19 or a person under investigation.     Close contacts should monitor their health; they should call their healthcare provider right away if they develop symptoms suggestive of COVID-19 (e.g., fever, cough, shortness of breath)     Close contacts should also follow these recommendations:  • Make sure that you understand and can help the patient follow their healthcare provider’s instructions for medication(s) and care. You should help the patient with basic needs in the home and provide support for getting groceries, prescriptions, and other personal needs.  • Monitor the patient’s  symptoms. If the patient is getting sicker, call his or her healthcare provider and tell them that the patient has laboratory-confirmed COVID-19. This will help the healthcare provider’s office take steps to keep other people in the office or waiting room from getting infected. Ask the healthcare provider to call the local or state health department for additional guidance. If the patient has a medical emergency and you need to call 911, notify the dispatch personnel that the patient has, or is being evaluated for COVID-19.  • Household members should stay in another room or be  from the patient as much as possible. Household members should use a separate bedroom and bathroom, if available.  • Prohibit visitors who do not have an essential need to be in the home.  • Household members should care for any pets in the home. Do not handle pets or other animals while sick.  For more information, see COVID-19 and Animals.  • Make sure that shared spaces in the home have good air flow, such as by an air conditioner or an opened window, weather permitting.  • Perform hand hygiene frequently. Wash your hands often with soap and water for at least 20 seconds or use an alcohol-based hand  that contains 60 to 95% alcohol, covering all surfaces of your hands and rubbing them together until they feel dry. Soap and water should be used preferentially if hands are visibly dirty.  • Avoid touching your eyes, nose, and mouth with unwashed hands.  • The patient should wear a facemask when you are around other people. If the patient is not able to wear a facemask (for example, because it causes trouble breathing), you, as the caregiver, should wear a mask when you are in the same room as the patient.  • Wear a disposable facemask and gloves when you touch or have contact with the patient’s blood, stool, or body fluids, such as saliva, sputum, nasal mucus, vomit, or urine.   o Throw out disposable facemasks and gloves  after using them. Do not reuse.  o When removing personal protective equipment, first remove and dispose of gloves. Then, immediately clean your hands with soap and water or alcohol-based hand . Next, remove and dispose of facemask, and immediately clean your hands again with soap and water or alcohol-based hand .  • Avoid sharing household items with the patient. You should not share dishes, drinking glasses, cups, eating utensils, towels, bedding, or other items. After the patient uses these items, you should wash them thoroughly (see below “Wash laundry thoroughly”).  • Clean all “high-touch” surfaces, such as counters, tabletops, doorknobs, bathroom fixtures, toilets, phones, keyboards, tablets, and bedside tables, every day. Also, clean any surfaces that may have blood, stool, or body fluids on them.   o Use a household cleaning spray or wipe, according to the label instructions. Labels contain instructions for safe and effective use of the cleaning product including precautions you should take when applying the product, such as wearing gloves and making sure you have good ventilation during use of the product.  • Wash laundry thoroughly.   o Immediately remove and wash clothes or bedding that have blood, stool, or body fluids on them.  o Wear disposable gloves while handling soiled items and keep soiled items away from your body. Clean your hands (with soap and water or an alcohol-based hand ) immediately after removing your gloves.  o Read and follow directions on labels of laundry or clothing items and detergent. In general, using a normal laundry detergent according to washing machine instructions and dry thoroughly using the warmest temperatures recommended on the clothing label.  • Place all used disposable gloves, facemasks, and other contaminated items in a lined container before disposing of them with other household waste. Clean your hands (with soap and water or an  alcohol-based hand ) immediately after handling these items. Soap and water should be used preferentially if hands are visibly dirty.  • Discuss any additional questions with your state or local health department or healthcare provider.    Adapted from information provided by the Centers for Disease Control and Prevention.  For more information, visit https://www.cdc.gov/coronavirus/2019-ncov/hcp/guidance-prevent-spread.html

## 2022-01-21 NOTE — PROGRESS NOTES
You have chosen to receive care through a telehealth visit.  Do you consent to use a video/audio connection for your medical care today? Yes     CHIEF COMPLAINT  Chief Complaint   Patient presents with   • Sinusitis         HPI  Estefany Travis Bryan is a 46 y.o. female  presents with complaint of congested, cough, diarrhea, headache and upset stomach. Reports symptoms started 2 days ago. Reports she has been blowing yellow mucous from her nose that has a small amt of blood. Reports no fever. + chills. + nausea. No vomiting. Reports she has been vaccinated with no booster. Reports she is having alittle SOA after coughing. Reports she has been in contact with coworkers with COVID.     Review of Systems   Constitutional: Positive for chills. Negative for fatigue and fever.   HENT: Positive for congestion and sinus pressure. Negative for ear discharge, ear pain, sinus pain and sore throat.    Respiratory: Positive for cough. Negative for chest tightness, shortness of breath and wheezing.    Cardiovascular: Negative for chest pain.   Gastrointestinal: Positive for diarrhea and nausea. Negative for vomiting.   Musculoskeletal: Negative for back pain and myalgias.   Neurological: Positive for headaches. Negative for dizziness.   Psychiatric/Behavioral: Negative.        Past Medical History:   Diagnosis Date   • Anxiety    • Arthritis     IN SI JOINT   • Breast cancer, right (HCC) 1/10/2018    s/p bilateral mastectomy; chemo and radiation as well    • Depression    • Dizzy    • GERD (gastroesophageal reflux disease) 01/23/2018    chemo related    • H/O Bell's palsy     oct 25 2015 or 2016   • High blood pressure    • History of chemotherapy     last therapy5/2018   • History of kidney stones    • History of radiation therapy    • Itchy eyes     bilat - from bells palsy    • Low back pain     DUE TO ARTHRITIS   • Migraine     HAS NOT HAD A MIGRAINE IN A YEAR   • Muscle ache     all over    • SOBOE (shortness of breath on  exertion)    • Tachycardia    • Tattoos     BACK AND LEFT HIP   • Wears glasses        Family History   Problem Relation Age of Onset   • Breast cancer Cousin 25   • Heart disease Mother    • No Known Problems Father        Social History     Socioeconomic History   • Marital status:    Tobacco Use   • Smoking status: Former Smoker     Packs/day: 0.50     Years: 20.00     Pack years: 10.00     Types: Electronic Cigarette, Cigarettes     Quit date:      Years since quittin.0   • Smokeless tobacco: Never Used   • Tobacco comment: quit vaping 4/15/20; quit cigarettes in    Vaping Use   • Vaping Use: Former   • Start date: 2011   • Quit date: 4/15/2020   Substance and Sexual Activity   • Alcohol use: No   • Drug use: No   • Sexual activity: Defer     Partners: Male         LMP 2017 Comment: last mammogram 2018  Breastfeeding No     PHYSICAL EXAM  Physical Exam   Constitutional: She is oriented to person, place, and time. She appears well-developed and well-nourished. No distress.   HENT:   Head: Normocephalic and atraumatic.   Right Ear: Hearing normal.   Left Ear: Hearing normal.   Nose: Congestion present. Right sinus exhibits maxillary sinus tenderness. Left sinus exhibits maxillary sinus tenderness.   Mouth/Throat: Mouth/Lips are normal.Oropharynx is clear and moist.   Eyes: Conjunctivae and lids are normal.   Pulmonary/Chest: Effort normal.  No respiratory distress.  Abdominal: There is no abdominal tenderness.   Lymphadenopathy:        Right cervical: No anterior cervical adenopathy present.       Left cervical: No anterior cervical adenopathy present.   Patient directed exam   Neurological: She is alert and oriented to person, place, and time. She has normal strength.   Psychiatric: She has a normal mood and affect. Her speech is normal and behavior is normal.       Results for orders placed or performed in visit on 21   Comprehensive Metabolic Panel    Specimen: Blood    Result Value Ref Range    Glucose 94 65 - 99 mg/dL    BUN 16 6 - 20 mg/dL    Creatinine 0.91 0.57 - 1.00 mg/dL    Sodium 140 136 - 145 mmol/L    Potassium 3.8 3.5 - 5.2 mmol/L    Chloride 103 98 - 107 mmol/L    CO2 24.0 22.0 - 29.0 mmol/L    Calcium 9.8 8.6 - 10.5 mg/dL    Total Protein 7.5 6.0 - 8.5 g/dL    Albumin 4.70 3.50 - 5.20 g/dL    ALT (SGPT) 26 1 - 33 U/L    AST (SGOT) 16 1 - 32 U/L    Alkaline Phosphatase 133 (H) 39 - 117 U/L    Total Bilirubin 0.4 0.0 - 1.2 mg/dL    eGFR Non African Amer 67 >60 mL/min/1.73    Globulin 2.8 gm/dL    A/G Ratio 1.7 g/dL    BUN/Creatinine Ratio 17.6 7.0 - 25.0    Anion Gap 13.0 5.0 - 15.0 mmol/L   CBC Auto Differential    Specimen: Blood   Result Value Ref Range    WBC 6.00 3.40 - 10.80 10*3/mm3    RBC 4.88 3.77 - 5.28 10*6/mm3    Hemoglobin 14.5 12.0 - 15.9 g/dL    Hematocrit 44.2 34.0 - 46.6 %    RDW 13.8 12.3 - 15.4 %    MCV 90.4 79.0 - 97.0 fL    MCH 29.7 26.6 - 33.0 pg    MCHC 32.9 31.5 - 35.7 g/dL    MPV 8.2 6.0 - 12.0 fL    Platelets 315 140 - 450 10*3/mm3    Neutrophil % 59.3 42.7 - 76.0 %    Lymphocyte % 35.7 19.6 - 45.3 %    Monocyte % 5.0 5.0 - 12.0 %    Neutrophils, Absolute 3.60 1.70 - 7.00 10*3/mm3    Lymphocytes, Absolute 2.10 0.70 - 3.10 10*3/mm3    Monocytes, Absolute 0.30 0.10 - 0.90 10*3/mm3       Diagnoses and all orders for this visit:    1. Otalgia, left (Primary)    2. Respiratory illness  -     COVID-19 PCR, [facility], NP SWAB IN [facility] TRANSPORT MED[facility]/ORAL SWISH 24-30 HR TAT - Swab, Nasopharynx; Future    Other orders  -     azithromycin (Zithromax Z-Kenneth) 250 MG tablet; Take 2 tablets by mouth on day 1, then 1 tablet daily on days 2-5  Dispense: 6 tablet; Refill: 0  -     albuterol sulfate  (90 Base) MCG/ACT inhaler; Inhale 2 puffs Every 4 (Four) Hours As Needed for Wheezing for up to 10 days.  Dispense: 18 g; Refill: 0  -     promethazine-dextromethorphan (PROMETHAZINE-DM) 6.25-15 MG/5ML syrup; Take 5 mL by mouth 4 (Four) Times a Day  As Needed for Cough for up to 7 days.  Dispense: 140 mL; Refill: 0    rest  Fluids  Quarantine   F/u with PCP if symptoms persist  ER for worsening symptoms such as high fever, chest pain or SOA      FOLLOW-UP  As discussed during visit with PCP/[facility] if no improvement or Urgent Care/Emergency Department if worsening of symptoms    Patient verbalizes understanding of medication dosage, comfort measures, instructions for treatment and follow-up.    Eva Villa, APRN  01/20/2022  19:16 EST    This visit was performed via Telehealth.  This patient has been instructed to follow-up with their primary care provider if their symptoms worsen or the treatment provided does not resolve their illness.

## 2022-05-05 ENCOUNTER — OFFICE VISIT (OUTPATIENT)
Dept: ONCOLOGY | Facility: CLINIC | Age: 47
End: 2022-05-05

## 2022-05-05 ENCOUNTER — LAB (OUTPATIENT)
Dept: LAB | Facility: HOSPITAL | Age: 47
End: 2022-05-05

## 2022-05-05 VITALS
HEART RATE: 97 BPM | DIASTOLIC BLOOD PRESSURE: 83 MMHG | SYSTOLIC BLOOD PRESSURE: 128 MMHG | RESPIRATION RATE: 18 BRPM | TEMPERATURE: 97.5 F | OXYGEN SATURATION: 97 % | BODY MASS INDEX: 31.83 KG/M2 | WEIGHT: 210 LBS | HEIGHT: 68 IN

## 2022-05-05 DIAGNOSIS — Z17.1 MALIGNANT NEOPLASM OF AREOLA OF RIGHT BREAST IN FEMALE, ESTROGEN RECEPTOR NEGATIVE: ICD-10-CM

## 2022-05-05 DIAGNOSIS — Z17.1 MALIGNANT NEOPLASM OF RIGHT BREAST IN FEMALE, ESTROGEN RECEPTOR NEGATIVE, UNSPECIFIED SITE OF BREAST: Primary | ICD-10-CM

## 2022-05-05 DIAGNOSIS — C50.011 MALIGNANT NEOPLASM OF AREOLA OF RIGHT BREAST IN FEMALE, ESTROGEN RECEPTOR NEGATIVE: ICD-10-CM

## 2022-05-05 DIAGNOSIS — C50.911 MALIGNANT NEOPLASM OF RIGHT BREAST IN FEMALE, ESTROGEN RECEPTOR NEGATIVE, UNSPECIFIED SITE OF BREAST: Primary | ICD-10-CM

## 2022-05-05 LAB
ALBUMIN SERPL-MCNC: 4.3 G/DL (ref 3.5–5.2)
ALBUMIN/GLOB SERPL: 1.5 G/DL
ALP SERPL-CCNC: 105 U/L (ref 39–117)
ALT SERPL W P-5'-P-CCNC: 26 U/L (ref 1–33)
ANION GAP SERPL CALCULATED.3IONS-SCNC: 11 MMOL/L (ref 5–15)
AST SERPL-CCNC: 18 U/L (ref 1–32)
BILIRUB SERPL-MCNC: 0.3 MG/DL (ref 0–1.2)
BUN SERPL-MCNC: 17 MG/DL (ref 6–20)
BUN/CREAT SERPL: 17.9 (ref 7–25)
CALCIUM SPEC-SCNC: 9.9 MG/DL (ref 8.6–10.5)
CHLORIDE SERPL-SCNC: 103 MMOL/L (ref 98–107)
CO2 SERPL-SCNC: 26 MMOL/L (ref 22–29)
CREAT SERPL-MCNC: 0.95 MG/DL (ref 0.57–1)
EGFRCR SERPLBLD CKD-EPI 2021: 75 ML/MIN/1.73
ERYTHROCYTE [DISTWIDTH] IN BLOOD BY AUTOMATED COUNT: 14.4 % (ref 12.3–15.4)
GLOBULIN UR ELPH-MCNC: 2.8 GM/DL
GLUCOSE SERPL-MCNC: 95 MG/DL (ref 65–99)
HCT VFR BLD AUTO: 43.8 % (ref 34–46.6)
HGB BLD-MCNC: 13.9 G/DL (ref 12–15.9)
LYMPHOCYTES # BLD AUTO: 2.6 10*3/MM3 (ref 0.7–3.1)
LYMPHOCYTES NFR BLD AUTO: 31.4 % (ref 19.6–45.3)
MCH RBC QN AUTO: 28.8 PG (ref 26.6–33)
MCHC RBC AUTO-ENTMCNC: 31.6 G/DL (ref 31.5–35.7)
MCV RBC AUTO: 90.9 FL (ref 79–97)
MONOCYTES # BLD AUTO: 0.5 10*3/MM3 (ref 0.1–0.9)
MONOCYTES NFR BLD AUTO: 6.5 % (ref 5–12)
NEUTROPHILS NFR BLD AUTO: 5.2 10*3/MM3 (ref 1.7–7)
NEUTROPHILS NFR BLD AUTO: 62.1 % (ref 42.7–76)
PLATELET # BLD AUTO: 342 10*3/MM3 (ref 140–450)
PMV BLD AUTO: 8.3 FL (ref 6–12)
POTASSIUM SERPL-SCNC: 4 MMOL/L (ref 3.5–5.2)
PROT SERPL-MCNC: 7.1 G/DL (ref 6–8.5)
RBC # BLD AUTO: 4.82 10*6/MM3 (ref 3.77–5.28)
SODIUM SERPL-SCNC: 140 MMOL/L (ref 136–145)
WBC NRBC COR # BLD: 8.4 10*3/MM3 (ref 3.4–10.8)

## 2022-05-05 PROCEDURE — 99213 OFFICE O/P EST LOW 20 MIN: CPT | Performed by: INTERNAL MEDICINE

## 2022-05-05 PROCEDURE — 85025 COMPLETE CBC W/AUTO DIFF WBC: CPT

## 2022-05-05 PROCEDURE — 36415 COLL VENOUS BLD VENIPUNCTURE: CPT

## 2022-05-05 PROCEDURE — 80053 COMPREHEN METABOLIC PANEL: CPT

## 2022-05-05 RX ORDER — LISINOPRIL 10 MG/1
10 TABLET ORAL DAILY
COMMUNITY
Start: 2022-03-31 | End: 2022-08-18

## 2022-05-05 NOTE — PROGRESS NOTES
"      PROBLEM LIST:  1. [id number] (stage IIIC) triple negative IDC of the right breast  A) patient presented with a enlarging mass in the right breast for 5-6 months associated with shooting pain.  Biopsy showed high grade invasive ductal carcinoma, ER negative, VT negative, HER-2 negative.  The mass on imaging measures approximately 9.7 cm.  Enlarged abnormal lymph nodes are present on imaging.  FNA of 1 axillary lymph node was negative for malignancy.  PET/CT on 1/12/18 showed large necrotic right breast mass, 2 pathologically enlarged and hypermetabolic intramammary nodes, and no evidence of distant disease.  Breast MRI on 1/17/18 showed evidence of pectoral muscle involvement, as well as several satellite lesions.  B) neoadjuvant ddAC followed by weekly taxol started on 1/23/18.  Taxol stopped after 10 doses due to persistent tachycardia  C) bilateral mastectomy on 6/21/18.  Residual 5 mm high grade IDC in the right breast.  0/4 LN involved.  [id number]  D) adjuvant radiation completed 8/27/18.  2.  Hypertension  3.  Anxiety  4.  Bell's palsy  5. Sinus tachycardia    Subjective      CC: breast cancer    HISTORY OF PRESENT ILLNESS:   Estefany Bryan returns today for follow-up.  She says she is feeling well.  No new concerns or symptoms.        Objective      /83   Pulse 97   Temp 97.5 °F (36.4 °C)   Resp 18   Ht 172.7 cm (68\")   Wt 95.3 kg (210 lb)   LMP 11/29/2017 Comment: last mammogram 2018  SpO2 97%   BMI 31.93 kg/m²      Performance Status: 0    General: well appearing female in no acute distress  Neuro: alert and oriented  HEENT: sclera anicteric, oropharynx clear  Lymphatics: no cervical, supraclavicular adenopathy. No palpable axillary adenopathy  Chest: Status post bilateral mastectomy with implant reconstruction.  No palpable masses or lesions  Cardiovascular: regular rate and rhythm, no murmurs  Lungs: clear to auscultation bilaterally  Abdomen: soft, nontender, nondistended.  No " palpable organomegaly  Extremeties: no lower extremity edema  Skin: no rashes, lesions, bruising, or petechiae  Psych: mood and affect appropriate    Labs:  Lab Results   Component Value Date    WBC 8.40 05/05/2022    HGB 13.9 05/05/2022    HCT 43.8 05/05/2022    MCV 90.9 05/05/2022     05/05/2022     Lab Results   Component Value Date    GLUCOSE 94 11/04/2021    BUN 16 11/04/2021    CREATININE 0.91 11/04/2021    EGFRIFNONA 67 11/04/2021    BCR 17.6 11/04/2021    K 3.8 11/04/2021    CO2 24.0 11/04/2021    CALCIUM 9.8 11/04/2021    ALBUMIN 4.70 11/04/2021    LABIL2 1.4 08/01/2015    AST 16 11/04/2021    ALT 26 11/04/2021       Assessment/Plan   Estefany Bryan is a 46 y.o. year old female with [id number] triple negative breast cancer who returns for follow up.  She continues to do well with no evidence of disease recurrence.      She is nearly 4 years out from completion of her treatment.    Follow-up in 6 months.                    Winsome Romano MD  [facility]    5/5/2022          CC:

## 2022-08-18 PROCEDURE — U0004 COV-19 TEST NON-CDC HGH THRU: HCPCS | Performed by: PHYSICIAN ASSISTANT

## 2022-11-10 ENCOUNTER — LAB (OUTPATIENT)
Dept: LAB | Facility: HOSPITAL | Age: 47
End: 2022-11-10

## 2022-11-10 ENCOUNTER — OFFICE VISIT (OUTPATIENT)
Dept: ONCOLOGY | Facility: CLINIC | Age: 47
End: 2022-11-10

## 2022-11-10 VITALS
TEMPERATURE: 97.1 F | DIASTOLIC BLOOD PRESSURE: 77 MMHG | RESPIRATION RATE: 16 BRPM | BODY MASS INDEX: 30.16 KG/M2 | HEIGHT: 68 IN | SYSTOLIC BLOOD PRESSURE: 135 MMHG | HEART RATE: 72 BPM | OXYGEN SATURATION: 97 % | WEIGHT: 199 LBS

## 2022-11-10 DIAGNOSIS — Z17.1 MALIGNANT NEOPLASM OF RIGHT BREAST IN FEMALE, ESTROGEN RECEPTOR NEGATIVE, UNSPECIFIED SITE OF BREAST: ICD-10-CM

## 2022-11-10 DIAGNOSIS — C50.911 MALIGNANT NEOPLASM OF RIGHT BREAST IN FEMALE, ESTROGEN RECEPTOR NEGATIVE, UNSPECIFIED SITE OF BREAST: Primary | ICD-10-CM

## 2022-11-10 DIAGNOSIS — Z17.1 MALIGNANT NEOPLASM OF RIGHT BREAST IN FEMALE, ESTROGEN RECEPTOR NEGATIVE, UNSPECIFIED SITE OF BREAST: Primary | ICD-10-CM

## 2022-11-10 DIAGNOSIS — C50.911 MALIGNANT NEOPLASM OF RIGHT BREAST IN FEMALE, ESTROGEN RECEPTOR NEGATIVE, UNSPECIFIED SITE OF BREAST: ICD-10-CM

## 2022-11-10 LAB
ALBUMIN SERPL-MCNC: 4.2 G/DL (ref 3.5–5.2)
ALBUMIN/GLOB SERPL: 1.6 G/DL
ALP SERPL-CCNC: 107 U/L (ref 39–117)
ALT SERPL W P-5'-P-CCNC: 37 U/L (ref 1–33)
ANION GAP SERPL CALCULATED.3IONS-SCNC: 11 MMOL/L (ref 5–15)
AST SERPL-CCNC: 21 U/L (ref 1–32)
BASOPHILS # BLD AUTO: 0.06 10*3/MM3 (ref 0–0.2)
BASOPHILS NFR BLD AUTO: 1 % (ref 0–1.5)
BILIRUB SERPL-MCNC: 0.2 MG/DL (ref 0–1.2)
BUN SERPL-MCNC: 9 MG/DL (ref 6–20)
BUN/CREAT SERPL: 10.8 (ref 7–25)
CALCIUM SPEC-SCNC: 9.5 MG/DL (ref 8.6–10.5)
CHLORIDE SERPL-SCNC: 104 MMOL/L (ref 98–107)
CO2 SERPL-SCNC: 25 MMOL/L (ref 22–29)
CREAT SERPL-MCNC: 0.83 MG/DL (ref 0.57–1)
DEPRECATED RDW RBC AUTO: 43.6 FL (ref 37–54)
EGFRCR SERPLBLD CKD-EPI 2021: 87.6 ML/MIN/1.73
EOSINOPHIL # BLD AUTO: 0.18 10*3/MM3 (ref 0–0.4)
EOSINOPHIL NFR BLD AUTO: 3 % (ref 0.3–6.2)
ERYTHROCYTE [DISTWIDTH] IN BLOOD BY AUTOMATED COUNT: 12.8 % (ref 12.3–15.4)
GLOBULIN UR ELPH-MCNC: 2.6 GM/DL
GLUCOSE SERPL-MCNC: 82 MG/DL (ref 65–99)
HCT VFR BLD AUTO: 39.6 % (ref 34–46.6)
HGB BLD-MCNC: 13.3 G/DL (ref 12–15.9)
IMM GRANULOCYTES # BLD AUTO: 0 10*3/MM3 (ref 0–0.05)
IMM GRANULOCYTES NFR BLD AUTO: 0 % (ref 0–0.5)
LYMPHOCYTES # BLD AUTO: 2.31 10*3/MM3 (ref 0.7–3.1)
LYMPHOCYTES NFR BLD AUTO: 38 % (ref 19.6–45.3)
MCH RBC QN AUTO: 30.9 PG (ref 26.6–33)
MCHC RBC AUTO-ENTMCNC: 33.6 G/DL (ref 31.5–35.7)
MCV RBC AUTO: 92.1 FL (ref 79–97)
MONOCYTES # BLD AUTO: 0.51 10*3/MM3 (ref 0.1–0.9)
MONOCYTES NFR BLD AUTO: 8.4 % (ref 5–12)
NEUTROPHILS NFR BLD AUTO: 3.02 10*3/MM3 (ref 1.7–7)
NEUTROPHILS NFR BLD AUTO: 49.6 % (ref 42.7–76)
PLATELET # BLD AUTO: 305 10*3/MM3 (ref 140–450)
PMV BLD AUTO: 10.2 FL (ref 6–12)
POTASSIUM SERPL-SCNC: 4.1 MMOL/L (ref 3.5–5.2)
PROT SERPL-MCNC: 6.8 G/DL (ref 6–8.5)
RBC # BLD AUTO: 4.3 10*6/MM3 (ref 3.77–5.28)
SODIUM SERPL-SCNC: 140 MMOL/L (ref 136–145)
WBC NRBC COR # BLD: 6.08 10*3/MM3 (ref 3.4–10.8)

## 2022-11-10 PROCEDURE — 80053 COMPREHEN METABOLIC PANEL: CPT

## 2022-11-10 PROCEDURE — 85025 COMPLETE CBC W/AUTO DIFF WBC: CPT

## 2022-11-10 PROCEDURE — 99213 OFFICE O/P EST LOW 20 MIN: CPT | Performed by: INTERNAL MEDICINE

## 2022-11-10 PROCEDURE — 36415 COLL VENOUS BLD VENIPUNCTURE: CPT

## 2022-11-10 RX ORDER — METOPROLOL SUCCINATE 50 MG/1
50 TABLET, EXTENDED RELEASE ORAL DAILY
COMMUNITY
Start: 2022-11-08

## 2022-11-10 RX ORDER — PAROXETINE 10 MG/1
10 TABLET, FILM COATED ORAL EVERY MORNING
COMMUNITY
Start: 2022-11-07

## 2022-11-10 NOTE — PROGRESS NOTES
"      PROBLEM LIST:  1. [id number] (stage IIIC) triple negative IDC of the right breast  A) patient presented with a enlarging mass in the right breast for 5-6 months associated with shooting pain.  Biopsy showed high grade invasive ductal carcinoma, ER negative, IL negative, HER-2 negative.  The mass on imaging measures approximately 9.7 cm.  Enlarged abnormal lymph nodes are present on imaging.  FNA of 1 axillary lymph node was negative for malignancy.  PET/CT on 1/12/18 showed large necrotic right breast mass, 2 pathologically enlarged and hypermetabolic intramammary nodes, and no evidence of distant disease.  Breast MRI on 1/17/18 showed evidence of pectoral muscle involvement, as well as several satellite lesions.  B) neoadjuvant ddAC followed by weekly taxol started on 1/23/18.  Taxol stopped after 10 doses due to persistent tachycardia  C) bilateral mastectomy on 6/21/18.  Residual 5 mm high grade IDC in the right breast.  0/4 LN involved.  [id number]  D) adjuvant radiation completed 8/27/18.  2.  Hypertension  3.  Anxiety  4.  Bell's palsy  5. Sinus tachycardia    Subjective      CC: breast cancer    HISTORY OF PRESENT ILLNESS:   Estefany Bryan returns today for follow-up.  She says she has been feeling well.  She was started on an SSRI by her GYN for hot flashes, and wants to ask about using a vaginal estrogen.        Objective      /77   Pulse 72   Temp 97.1 °F (36.2 °C) (Temporal)   Resp 16   Ht 172.7 cm (67.99\")   Wt 90.3 kg (199 lb)   LMP 11/29/2017 Comment: last mammogram 2018  SpO2 97%   BMI 30.26 kg/m²      Performance Status: 0    General: well appearing female in no acute distress  Neuro: alert and oriented  HEENT: sclera anicteric, oropharynx clear  Lymphatics: no cervical, supraclavicular adenopathy. No palpable axillary adenopathy  Chest: Status post bilateral mastectomy with implant reconstruction.  No palpable masses or lesions  Cardiovascular: regular rate and rhythm, no " murmurs  Lungs: clear to auscultation bilaterally  Abdomen: soft, nontender, nondistended.  No palpable organomegaly  Extremeties: no lower extremity edema  Skin: no rashes, lesions, bruising, or petechiae  Psych: mood and affect appropriate    Labs:  Lab Results   Component Value Date    WBC 6.08 11/10/2022    HGB 13.3 11/10/2022    HCT 39.6 11/10/2022    MCV 92.1 11/10/2022     11/10/2022     Lab Results   Component Value Date    GLUCOSE 95 05/05/2022    BUN 17 05/05/2022    CREATININE 0.95 05/05/2022    EGFRIFNONA 67 11/04/2021    BCR 17.9 05/05/2022    K 4.0 05/05/2022    CO2 26.0 05/05/2022    CALCIUM 9.9 05/05/2022    ALBUMIN 4.30 05/05/2022    LABIL2 1.4 08/01/2015    AST 18 05/05/2022    ALT 26 05/05/2022       Assessment & Plan   Estefany Bryan is a 47 y.o. year old female with [id number] triple negative breast cancer who returns for follow up.  She continues to do well with no evidence of disease recurrence.      In June she will be 5 years from completing her chemotherapy and undergoing surgery.  At this point I think her risk of recurrence is quite low.  We discussed that after 5 years she has the option of continuing to follow-up once yearly in our nurse practitioner survivorship clinic, versus just follow-up with her primary care doctor and only come back as needed.  She can think about this and let me know with her next visit which she would like to do.    Follow-up in June.                  Winsome Romano MD  [facility]    11/10/2022          CC:

## 2023-11-28 NOTE — PROGRESS NOTES
Returned call to patient.  She reports -120 all week, without exertion.  She has body aches from her chemo but no other new symptoms.  She takes Toprol XL and has not missed any doses.  She cancelled her chemo infusion today because she is worried about it.    We will have her come to get an EKG.  We will be in touch with her once results are available.  
Health Care Proxy (HCP)

## 2024-04-10 ENCOUNTER — PRE-ADMISSION TESTING (OUTPATIENT)
Dept: PREADMISSION TESTING | Facility: HOSPITAL | Age: 49
End: 2024-04-10
Payer: COMMERCIAL

## 2024-04-10 VITALS — BODY MASS INDEX: 26.95 KG/M2 | WEIGHT: 177.8 LBS | HEIGHT: 68 IN

## 2024-04-10 LAB
DEPRECATED RDW RBC AUTO: 43.1 FL (ref 37–54)
ERYTHROCYTE [DISTWIDTH] IN BLOOD BY AUTOMATED COUNT: 12.9 % (ref 12.3–15.4)
HBA1C MFR BLD: 5.2 % (ref 4.8–5.6)
HCT VFR BLD AUTO: 41.7 % (ref 34–46.6)
HGB BLD-MCNC: 14 G/DL (ref 12–15.9)
MCH RBC QN AUTO: 30.7 PG (ref 26.6–33)
MCHC RBC AUTO-ENTMCNC: 33.6 G/DL (ref 31.5–35.7)
MCV RBC AUTO: 91.4 FL (ref 79–97)
PLATELET # BLD AUTO: 302 10*3/MM3 (ref 140–450)
PMV BLD AUTO: 10.4 FL (ref 6–12)
POTASSIUM SERPL-SCNC: 3.8 MMOL/L (ref 3.5–5.2)
QT INTERVAL: 408 MS
QTC INTERVAL: 376 MS
RBC # BLD AUTO: 4.56 10*6/MM3 (ref 3.77–5.28)
WBC NRBC COR # BLD AUTO: 6.55 10*3/MM3 (ref 3.4–10.8)

## 2024-04-10 PROCEDURE — 93005 ELECTROCARDIOGRAM TRACING: CPT

## 2024-04-10 PROCEDURE — 84132 ASSAY OF SERUM POTASSIUM: CPT

## 2024-04-10 PROCEDURE — 83036 HEMOGLOBIN GLYCOSYLATED A1C: CPT

## 2024-04-10 PROCEDURE — 36415 COLL VENOUS BLD VENIPUNCTURE: CPT

## 2024-04-10 PROCEDURE — 85027 COMPLETE CBC AUTOMATED: CPT

## 2024-04-10 PROCEDURE — 93010 ELECTROCARDIOGRAM REPORT: CPT | Performed by: INTERNAL MEDICINE

## 2024-04-10 RX ORDER — ONDANSETRON 4 MG/1
4 TABLET, FILM COATED ORAL EVERY 8 HOURS PRN
COMMUNITY
Start: 2024-03-05

## 2024-04-10 RX ORDER — ZOLPIDEM TARTRATE 5 MG/1
5 TABLET ORAL NIGHTLY PRN
COMMUNITY
Start: 2024-03-14

## 2024-04-10 NOTE — PAT
An arrival time for procedure was not provided during PAT visit. If patient had any questions or concerns about their arrival time, they were instructed to contact their surgeon/physician.  Additionally, if the patient referred to an arrival time that was acquired from their my chart account, patient was encouraged to verify that time with their surgeon/physician. Arrival times are NOT provided in Pre Admission Testing Department.    Patient viewed general PAT education video as instructed in their preoperative information received from their surgeon.  Patient stated the general PAT education video was viewed in its entirety and survey completed.  Copies of PAT general education handouts (Incentive Spirometry, Meds to Beds Program, Patient Belongings, Pre-op skin preparation instructions, Blood Glucose testing, Visitor policy, Surgery FAQ, Code H) distributed to patient if not printed. Education related to the PAT pass and skin preparation for surgery (if applicable) completed in PAT as a reinforcement to PAT education video. Patient instructed to return PAT pass provided today as well as completed skin preparation sheet (if applicable) on the day of procedure.     Additionally if patient had not viewed video yet but intended to view it at home or in our waiting area, then referred them to the handout with QR code/link provided during PAT visit.  Instructed patient to complete survey after viewing the video in its entirety.  Encouraged patient/family to read PAT general education handouts thoroughly and notify PAT staff with any questions or concerns. Patient verbalized understanding of all information and priority content.    Patient instructed to drink 20 ounces of Gatorade or Gatorlyte (if diabetic) and it needs to be completed 1 hour (for Main OR patients) or 2 hours (scheduled  section & [facility] patients) before given arrival time for procedure (NO RED Gatorade and NO Gatorade Zero).    Patient verbalized  understanding.    Patient denies any current skin issues.     Patient to apply READY BATH LUXE wipes  to surgical area (as instructed) the night before procedure and the AM of procedure. Wipes provided.    EKG IN CHART

## 2024-04-16 ENCOUNTER — ANESTHESIA EVENT (OUTPATIENT)
Dept: PERIOP | Facility: HOSPITAL | Age: 49
End: 2024-04-16
Payer: COMMERCIAL

## 2024-04-16 RX ORDER — SODIUM CHLORIDE 9 MG/ML
40 INJECTION, SOLUTION INTRAVENOUS AS NEEDED
Status: CANCELLED | OUTPATIENT
Start: 2024-04-16

## 2024-04-16 RX ORDER — FAMOTIDINE 10 MG/ML
20 INJECTION, SOLUTION INTRAVENOUS ONCE
Status: CANCELLED | OUTPATIENT
Start: 2024-04-16 | End: 2024-04-16

## 2024-04-16 RX ORDER — SODIUM CHLORIDE 0.9 % (FLUSH) 0.9 %
10 SYRINGE (ML) INJECTION EVERY 12 HOURS SCHEDULED
Status: CANCELLED | OUTPATIENT
Start: 2024-04-16

## 2024-04-16 RX ORDER — SODIUM CHLORIDE 0.9 % (FLUSH) 0.9 %
10 SYRINGE (ML) INJECTION AS NEEDED
Status: CANCELLED | OUTPATIENT
Start: 2024-04-16

## 2024-04-17 ENCOUNTER — HOSPITAL ENCOUNTER (INPATIENT)
Facility: HOSPITAL | Age: 49
LOS: 1 days | Discharge: HOME | End: 2024-04-18
Attending: ORTHOPAEDIC SURGERY | Admitting: ORTHOPAEDIC SURGERY
Payer: COMMERCIAL

## 2024-04-17 ENCOUNTER — APPOINTMENT (OUTPATIENT)
Dept: GENERAL RADIOLOGY | Facility: HOSPITAL | Age: 49
End: 2024-04-17
Payer: COMMERCIAL

## 2024-04-17 ENCOUNTER — ANESTHESIA (OUTPATIENT)
Dept: PERIOP | Facility: HOSPITAL | Age: 49
End: 2024-04-17
Payer: COMMERCIAL

## 2024-04-17 DIAGNOSIS — Z98.1 S/P LUMBAR FUSION: Primary | ICD-10-CM

## 2024-04-17 PROBLEM — M54.9 BACK PAIN: Status: ACTIVE | Noted: 2024-04-17

## 2024-04-17 LAB — GLUCOSE BLDC GLUCOMTR-MCNC: 197 MG/DL (ref 70–130)

## 2024-04-17 PROCEDURE — 25010000002 CEFAZOLIN PER 500 MG: Performed by: ORTHOPAEDIC SURGERY

## 2024-04-17 PROCEDURE — C1713 ANCHOR/SCREW BN/BN,TIS/BN: HCPCS | Performed by: ORTHOPAEDIC SURGERY

## 2024-04-17 PROCEDURE — C1831: HCPCS | Performed by: ORTHOPAEDIC SURGERY

## 2024-04-17 PROCEDURE — S0260 H&P FOR SURGERY: HCPCS | Performed by: PHYSICIAN ASSISTANT

## 2024-04-17 PROCEDURE — 0SG3071 FUSION OF LUMBOSACRAL JOINT WITH AUTOLOGOUS TISSUE SUBSTITUTE, POSTERIOR APPROACH, POSTERIOR COLUMN, OPEN APPROACH: ICD-10-PCS | Performed by: ORTHOPAEDIC SURGERY

## 2024-04-17 PROCEDURE — 25010000002 SUGAMMADEX 200 MG/2ML SOLUTION: Performed by: NURSE ANESTHETIST, CERTIFIED REGISTERED

## 2024-04-17 PROCEDURE — 25010000002 VANCOMYCIN 1 G RECONSTITUTED SOLUTION: Performed by: ORTHOPAEDIC SURGERY

## 2024-04-17 PROCEDURE — 25010000002 DEXAMETHASONE PER 1 MG: Performed by: NURSE ANESTHETIST, CERTIFIED REGISTERED

## 2024-04-17 PROCEDURE — 25010000002 PHENYLEPHRINE 10 MG/ML SOLUTION 1 ML VIAL: Performed by: NURSE ANESTHETIST, CERTIFIED REGISTERED

## 2024-04-17 PROCEDURE — 0ST20ZZ RESECTION OF LUMBAR VERTEBRAL DISC, OPEN APPROACH: ICD-10-PCS | Performed by: ORTHOPAEDIC SURGERY

## 2024-04-17 PROCEDURE — 0ST40ZZ RESECTION OF LUMBOSACRAL DISC, OPEN APPROACH: ICD-10-PCS | Performed by: ORTHOPAEDIC SURGERY

## 2024-04-17 PROCEDURE — 25810000003 SODIUM CHLORIDE 0.9 % SOLUTION 250 ML FLEX CONT: Performed by: NURSE ANESTHETIST, CERTIFIED REGISTERED

## 2024-04-17 PROCEDURE — P9041 ALBUMIN (HUMAN),5%, 50ML: HCPCS | Performed by: NURSE ANESTHETIST, CERTIFIED REGISTERED

## 2024-04-17 PROCEDURE — 25010000002 ONDANSETRON PER 1 MG: Performed by: NURSE ANESTHETIST, CERTIFIED REGISTERED

## 2024-04-17 PROCEDURE — 25010000002 HYDROMORPHONE 1 MG/ML SOLUTION

## 2024-04-17 PROCEDURE — 25010000002 SODIUM CHLORIDE 0.9 % WITH KCL 20 MEQ 20-0.9 MEQ/L-% SOLUTION: Performed by: ORTHOPAEDIC SURGERY

## 2024-04-17 PROCEDURE — 25010000002 PROPOFOL 10 MG/ML EMULSION: Performed by: NURSE ANESTHETIST, CERTIFIED REGISTERED

## 2024-04-17 PROCEDURE — 25810000003 LACTATED RINGERS PER 1000 ML: Performed by: ANESTHESIOLOGY

## 2024-04-17 PROCEDURE — 25010000002 MIDAZOLAM PER 1 MG: Performed by: ANESTHESIOLOGY

## 2024-04-17 PROCEDURE — 25010000002 FENTANYL CITRATE (PF) 100 MCG/2ML SOLUTION: Performed by: NURSE ANESTHETIST, CERTIFIED REGISTERED

## 2024-04-17 PROCEDURE — 76000 FLUOROSCOPY <1 HR PHYS/QHP: CPT

## 2024-04-17 PROCEDURE — 25010000002 PROCHLORPERAZINE 10 MG/2ML SOLUTION: Performed by: NURSE PRACTITIONER

## 2024-04-17 PROCEDURE — 0SG30AJ FUSION OF LUMBOSACRAL JOINT WITH INTERBODY FUSION DEVICE, POSTERIOR APPROACH, ANTERIOR COLUMN, OPEN APPROACH: ICD-10-PCS | Performed by: ORTHOPAEDIC SURGERY

## 2024-04-17 PROCEDURE — 25010000002 ONDANSETRON PER 1 MG: Performed by: ORTHOPAEDIC SURGERY

## 2024-04-17 PROCEDURE — 25010000002 ALBUMIN HUMAN 5% PER 50 ML: Performed by: NURSE ANESTHETIST, CERTIFIED REGISTERED

## 2024-04-17 PROCEDURE — 82948 REAGENT STRIP/BLOOD GLUCOSE: CPT

## 2024-04-17 PROCEDURE — 0SG0071 FUSION OF LUMBAR VERTEBRAL JOINT WITH AUTOLOGOUS TISSUE SUBSTITUTE, POSTERIOR APPROACH, POSTERIOR COLUMN, OPEN APPROACH: ICD-10-PCS | Performed by: ORTHOPAEDIC SURGERY

## 2024-04-17 PROCEDURE — 0SG00AJ FUSION OF LUMBAR VERTEBRAL JOINT WITH INTERBODY FUSION DEVICE, POSTERIOR APPROACH, ANTERIOR COLUMN, OPEN APPROACH: ICD-10-PCS | Performed by: ORTHOPAEDIC SURGERY

## 2024-04-17 RX ORDER — FAMOTIDINE 10 MG/ML
20 INJECTION, SOLUTION INTRAVENOUS EVERY 12 HOURS SCHEDULED
Status: DISCONTINUED | OUTPATIENT
Start: 2024-04-17 | End: 2024-04-18 | Stop reason: HOSPADM

## 2024-04-17 RX ORDER — ONDANSETRON 2 MG/ML
INJECTION INTRAMUSCULAR; INTRAVENOUS AS NEEDED
Status: DISCONTINUED | OUTPATIENT
Start: 2024-04-17 | End: 2024-04-17 | Stop reason: SURG

## 2024-04-17 RX ORDER — ROCURONIUM BROMIDE 10 MG/ML
INJECTION, SOLUTION INTRAVENOUS AS NEEDED
Status: DISCONTINUED | OUTPATIENT
Start: 2024-04-17 | End: 2024-04-17 | Stop reason: SURG

## 2024-04-17 RX ORDER — EPHEDRINE SULFATE 50 MG/ML
INJECTION INTRAVENOUS AS NEEDED
Status: DISCONTINUED | OUTPATIENT
Start: 2024-04-17 | End: 2024-04-17 | Stop reason: SURG

## 2024-04-17 RX ORDER — NALOXONE HCL 0.4 MG/ML
0.4 VIAL (ML) INJECTION
Status: DISCONTINUED | OUTPATIENT
Start: 2024-04-17 | End: 2024-04-18 | Stop reason: HOSPADM

## 2024-04-17 RX ORDER — DEXAMETHASONE SODIUM PHOSPHATE 4 MG/ML
INJECTION, SOLUTION INTRA-ARTICULAR; INTRALESIONAL; INTRAMUSCULAR; INTRAVENOUS; SOFT TISSUE AS NEEDED
Status: DISCONTINUED | OUTPATIENT
Start: 2024-04-17 | End: 2024-04-17 | Stop reason: SURG

## 2024-04-17 RX ORDER — PROCHLORPERAZINE EDISYLATE 5 MG/ML
5 INJECTION INTRAMUSCULAR; INTRAVENOUS EVERY 6 HOURS PRN
Status: DISCONTINUED | OUTPATIENT
Start: 2024-04-17 | End: 2024-04-18 | Stop reason: HOSPADM

## 2024-04-17 RX ORDER — PAROXETINE 10 MG/1
10 TABLET, FILM COATED ORAL EVERY MORNING
Status: DISCONTINUED | OUTPATIENT
Start: 2024-04-18 | End: 2024-04-18 | Stop reason: HOSPADM

## 2024-04-17 RX ORDER — HYDROCODONE BITARTRATE AND ACETAMINOPHEN 5; 325 MG/1; MG/1
1 TABLET ORAL ONCE AS NEEDED
Status: DISCONTINUED | OUTPATIENT
Start: 2024-04-17 | End: 2024-04-17 | Stop reason: HOSPADM

## 2024-04-17 RX ORDER — ONDANSETRON 2 MG/ML
4 INJECTION INTRAMUSCULAR; INTRAVENOUS ONCE AS NEEDED
Status: DISCONTINUED | OUTPATIENT
Start: 2024-04-17 | End: 2024-04-17 | Stop reason: HOSPADM

## 2024-04-17 RX ORDER — PROMETHAZINE HYDROCHLORIDE 25 MG/1
25 TABLET ORAL ONCE AS NEEDED
Status: DISCONTINUED | OUTPATIENT
Start: 2024-04-17 | End: 2024-04-17 | Stop reason: HOSPADM

## 2024-04-17 RX ORDER — DROPERIDOL 2.5 MG/ML
0.62 INJECTION, SOLUTION INTRAMUSCULAR; INTRAVENOUS ONCE AS NEEDED
Status: DISCONTINUED | OUTPATIENT
Start: 2024-04-17 | End: 2024-04-17 | Stop reason: HOSPADM

## 2024-04-17 RX ORDER — ACETAMINOPHEN 500 MG
1000 TABLET ORAL ONCE
Status: COMPLETED | OUTPATIENT
Start: 2024-04-17 | End: 2024-04-17

## 2024-04-17 RX ORDER — TRANEXAMIC ACID 10 MG/ML
1000 INJECTION, SOLUTION INTRAVENOUS ONCE
Status: COMPLETED | OUTPATIENT
Start: 2024-04-17 | End: 2024-04-17

## 2024-04-17 RX ORDER — BUPIVACAINE HYDROCHLORIDE AND EPINEPHRINE 2.5; 5 MG/ML; UG/ML
INJECTION, SOLUTION INFILTRATION; PERINEURAL AS NEEDED
Status: DISCONTINUED | OUTPATIENT
Start: 2024-04-17 | End: 2024-04-17 | Stop reason: HOSPADM

## 2024-04-17 RX ORDER — LABETALOL HYDROCHLORIDE 5 MG/ML
5 INJECTION, SOLUTION INTRAVENOUS
Status: DISCONTINUED | OUTPATIENT
Start: 2024-04-17 | End: 2024-04-17 | Stop reason: HOSPADM

## 2024-04-17 RX ORDER — SODIUM CHLORIDE AND POTASSIUM CHLORIDE 150; 900 MG/100ML; MG/100ML
100 INJECTION, SOLUTION INTRAVENOUS CONTINUOUS
Status: DISCONTINUED | OUTPATIENT
Start: 2024-04-17 | End: 2024-04-18 | Stop reason: HOSPADM

## 2024-04-17 RX ORDER — IPRATROPIUM BROMIDE AND ALBUTEROL SULFATE 2.5; .5 MG/3ML; MG/3ML
3 SOLUTION RESPIRATORY (INHALATION) ONCE AS NEEDED
Status: DISCONTINUED | OUTPATIENT
Start: 2024-04-17 | End: 2024-04-17 | Stop reason: HOSPADM

## 2024-04-17 RX ORDER — ONDANSETRON 2 MG/ML
4 INJECTION INTRAMUSCULAR; INTRAVENOUS EVERY 6 HOURS PRN
Status: DISCONTINUED | OUTPATIENT
Start: 2024-04-17 | End: 2024-04-17 | Stop reason: SDUPTHER

## 2024-04-17 RX ORDER — SODIUM CHLORIDE 0.9 % (FLUSH) 0.9 %
3 SYRINGE (ML) INJECTION EVERY 12 HOURS SCHEDULED
Status: DISCONTINUED | OUTPATIENT
Start: 2024-04-17 | End: 2024-04-18 | Stop reason: HOSPADM

## 2024-04-17 RX ORDER — FAMOTIDINE 20 MG/1
20 TABLET, FILM COATED ORAL EVERY 12 HOURS SCHEDULED
Status: DISCONTINUED | OUTPATIENT
Start: 2024-04-17 | End: 2024-04-18 | Stop reason: HOSPADM

## 2024-04-17 RX ORDER — DULOXETIN HYDROCHLORIDE 60 MG/1
60 CAPSULE, DELAYED RELEASE ORAL DAILY
Status: DISCONTINUED | OUTPATIENT
Start: 2024-04-17 | End: 2024-04-18 | Stop reason: HOSPADM

## 2024-04-17 RX ORDER — SODIUM CHLORIDE 9 MG/ML
40 INJECTION, SOLUTION INTRAVENOUS AS NEEDED
Status: DISCONTINUED | OUTPATIENT
Start: 2024-04-17 | End: 2024-04-18 | Stop reason: HOSPADM

## 2024-04-17 RX ORDER — LABETALOL HYDROCHLORIDE 5 MG/ML
10 INJECTION, SOLUTION INTRAVENOUS EVERY 4 HOURS PRN
Status: DISCONTINUED | OUTPATIENT
Start: 2024-04-17 | End: 2024-04-18 | Stop reason: HOSPADM

## 2024-04-17 RX ORDER — PROPOFOL 10 MG/ML
VIAL (ML) INTRAVENOUS AS NEEDED
Status: DISCONTINUED | OUTPATIENT
Start: 2024-04-17 | End: 2024-04-17 | Stop reason: SURG

## 2024-04-17 RX ORDER — HYDROCODONE BITARTRATE AND ACETAMINOPHEN 7.5; 325 MG/1; MG/1
1 TABLET ORAL EVERY 4 HOURS PRN
Status: DISCONTINUED | OUTPATIENT
Start: 2024-04-17 | End: 2024-04-18 | Stop reason: HOSPADM

## 2024-04-17 RX ORDER — ACETAMINOPHEN 325 MG/1
650 TABLET ORAL EVERY 4 HOURS PRN
Status: DISCONTINUED | OUTPATIENT
Start: 2024-04-17 | End: 2024-04-18 | Stop reason: HOSPADM

## 2024-04-17 RX ORDER — MEPERIDINE HYDROCHLORIDE 25 MG/ML
12.5 INJECTION INTRAMUSCULAR; INTRAVENOUS; SUBCUTANEOUS
Status: DISCONTINUED | OUTPATIENT
Start: 2024-04-17 | End: 2024-04-17 | Stop reason: HOSPADM

## 2024-04-17 RX ORDER — PROMETHAZINE HYDROCHLORIDE 25 MG/1
25 SUPPOSITORY RECTAL ONCE AS NEEDED
Status: DISCONTINUED | OUTPATIENT
Start: 2024-04-17 | End: 2024-04-17 | Stop reason: HOSPADM

## 2024-04-17 RX ORDER — ONDANSETRON 2 MG/ML
4 INJECTION INTRAMUSCULAR; INTRAVENOUS EVERY 6 HOURS PRN
Status: DISCONTINUED | OUTPATIENT
Start: 2024-04-17 | End: 2024-04-18 | Stop reason: HOSPADM

## 2024-04-17 RX ORDER — BISACODYL 10 MG
10 SUPPOSITORY, RECTAL RECTAL DAILY PRN
Status: DISCONTINUED | OUTPATIENT
Start: 2024-04-17 | End: 2024-04-18 | Stop reason: HOSPADM

## 2024-04-17 RX ORDER — SODIUM CHLORIDE 9 MG/ML
40 INJECTION, SOLUTION INTRAVENOUS AS NEEDED
Status: DISCONTINUED | OUTPATIENT
Start: 2024-04-17 | End: 2024-04-17 | Stop reason: HOSPADM

## 2024-04-17 RX ORDER — PANTOPRAZOLE SODIUM 40 MG/1
40 TABLET, DELAYED RELEASE ORAL
Status: DISCONTINUED | OUTPATIENT
Start: 2024-04-18 | End: 2024-04-18 | Stop reason: HOSPADM

## 2024-04-17 RX ORDER — PROCHLORPERAZINE MALEATE 5 MG/1
5 TABLET ORAL EVERY 6 HOURS PRN
Status: DISCONTINUED | OUTPATIENT
Start: 2024-04-17 | End: 2024-04-18 | Stop reason: HOSPADM

## 2024-04-17 RX ORDER — MAGNESIUM HYDROXIDE 1200 MG/15ML
LIQUID ORAL AS NEEDED
Status: DISCONTINUED | OUTPATIENT
Start: 2024-04-17 | End: 2024-04-17 | Stop reason: HOSPADM

## 2024-04-17 RX ORDER — PROCHLORPERAZINE 25 MG
25 SUPPOSITORY, RECTAL RECTAL EVERY 12 HOURS PRN
Status: DISCONTINUED | OUTPATIENT
Start: 2024-04-17 | End: 2024-04-18 | Stop reason: HOSPADM

## 2024-04-17 RX ORDER — FENTANYL CITRATE 50 UG/ML
INJECTION, SOLUTION INTRAMUSCULAR; INTRAVENOUS AS NEEDED
Status: DISCONTINUED | OUTPATIENT
Start: 2024-04-17 | End: 2024-04-17 | Stop reason: SURG

## 2024-04-17 RX ORDER — FAMOTIDINE 20 MG/1
20 TABLET, FILM COATED ORAL ONCE
Status: COMPLETED | OUTPATIENT
Start: 2024-04-17 | End: 2024-04-17

## 2024-04-17 RX ORDER — MIDAZOLAM HYDROCHLORIDE 1 MG/ML
1 INJECTION INTRAMUSCULAR; INTRAVENOUS
Status: DISCONTINUED | OUTPATIENT
Start: 2024-04-17 | End: 2024-04-17 | Stop reason: HOSPADM

## 2024-04-17 RX ORDER — SODIUM CHLORIDE 0.9 % (FLUSH) 0.9 %
3-10 SYRINGE (ML) INJECTION AS NEEDED
Status: DISCONTINUED | OUTPATIENT
Start: 2024-04-17 | End: 2024-04-18 | Stop reason: HOSPADM

## 2024-04-17 RX ORDER — BISACODYL 5 MG/1
5 TABLET, DELAYED RELEASE ORAL DAILY PRN
Status: DISCONTINUED | OUTPATIENT
Start: 2024-04-17 | End: 2024-04-18 | Stop reason: HOSPADM

## 2024-04-17 RX ORDER — ALBUMIN, HUMAN INJ 5% 5 %
SOLUTION INTRAVENOUS CONTINUOUS PRN
Status: DISCONTINUED | OUTPATIENT
Start: 2024-04-17 | End: 2024-04-17 | Stop reason: SURG

## 2024-04-17 RX ORDER — METHOCARBAMOL 500 MG/1
1000 TABLET, FILM COATED ORAL 4 TIMES DAILY PRN
Status: DISCONTINUED | OUTPATIENT
Start: 2024-04-17 | End: 2024-04-17

## 2024-04-17 RX ORDER — HYDRALAZINE HYDROCHLORIDE 20 MG/ML
5 INJECTION INTRAMUSCULAR; INTRAVENOUS
Status: DISCONTINUED | OUTPATIENT
Start: 2024-04-17 | End: 2024-04-17 | Stop reason: HOSPADM

## 2024-04-17 RX ORDER — SODIUM CHLORIDE, SODIUM LACTATE, POTASSIUM CHLORIDE, CALCIUM CHLORIDE 600; 310; 30; 20 MG/100ML; MG/100ML; MG/100ML; MG/100ML
9 INJECTION, SOLUTION INTRAVENOUS CONTINUOUS
Status: DISCONTINUED | OUTPATIENT
Start: 2024-04-17 | End: 2024-04-18 | Stop reason: HOSPADM

## 2024-04-17 RX ORDER — SODIUM CHLORIDE 0.9 % (FLUSH) 0.9 %
3-10 SYRINGE (ML) INJECTION AS NEEDED
Status: DISCONTINUED | OUTPATIENT
Start: 2024-04-17 | End: 2024-04-17 | Stop reason: HOSPADM

## 2024-04-17 RX ORDER — ZOLPIDEM TARTRATE 5 MG/1
5 TABLET ORAL NIGHTLY PRN
Status: DISCONTINUED | OUTPATIENT
Start: 2024-04-17 | End: 2024-04-18 | Stop reason: HOSPADM

## 2024-04-17 RX ORDER — OXYCODONE AND ACETAMINOPHEN 10; 325 MG/1; MG/1
1 TABLET ORAL EVERY 4 HOURS PRN
Status: DISCONTINUED | OUTPATIENT
Start: 2024-04-17 | End: 2024-04-18 | Stop reason: HOSPADM

## 2024-04-17 RX ORDER — DROPERIDOL 2.5 MG/ML
0.62 INJECTION, SOLUTION INTRAMUSCULAR; INTRAVENOUS
Status: DISCONTINUED | OUTPATIENT
Start: 2024-04-17 | End: 2024-04-17 | Stop reason: HOSPADM

## 2024-04-17 RX ORDER — SODIUM CHLORIDE 0.9 % (FLUSH) 0.9 %
3 SYRINGE (ML) INJECTION EVERY 12 HOURS SCHEDULED
Status: DISCONTINUED | OUTPATIENT
Start: 2024-04-17 | End: 2024-04-17 | Stop reason: HOSPADM

## 2024-04-17 RX ORDER — NALOXONE HCL 0.4 MG/ML
0.4 VIAL (ML) INJECTION AS NEEDED
Status: DISCONTINUED | OUTPATIENT
Start: 2024-04-17 | End: 2024-04-17 | Stop reason: HOSPADM

## 2024-04-17 RX ORDER — HYDROMORPHONE HYDROCHLORIDE 1 MG/ML
0.5 INJECTION, SOLUTION INTRAMUSCULAR; INTRAVENOUS; SUBCUTANEOUS
Status: COMPLETED | OUTPATIENT
Start: 2024-04-17 | End: 2024-04-17

## 2024-04-17 RX ORDER — OXYCODONE HCL 10 MG/1
10 TABLET, FILM COATED, EXTENDED RELEASE ORAL ONCE
Status: COMPLETED | OUTPATIENT
Start: 2024-04-17 | End: 2024-04-17

## 2024-04-17 RX ORDER — PHENYLEPHRINE HCL IN 0.9% NACL 1 MG/10 ML
SYRINGE (ML) INTRAVENOUS AS NEEDED
Status: DISCONTINUED | OUTPATIENT
Start: 2024-04-17 | End: 2024-04-17 | Stop reason: SURG

## 2024-04-17 RX ORDER — VANCOMYCIN HYDROCHLORIDE 1 G/20ML
INJECTION, POWDER, LYOPHILIZED, FOR SOLUTION INTRAVENOUS AS NEEDED
Status: DISCONTINUED | OUTPATIENT
Start: 2024-04-17 | End: 2024-04-17 | Stop reason: HOSPADM

## 2024-04-17 RX ORDER — ALPRAZOLAM 1 MG/1
1 TABLET ORAL 3 TIMES DAILY PRN
Status: DISCONTINUED | OUTPATIENT
Start: 2024-04-17 | End: 2024-04-18 | Stop reason: HOSPADM

## 2024-04-17 RX ORDER — LIDOCAINE HYDROCHLORIDE 10 MG/ML
INJECTION, SOLUTION EPIDURAL; INFILTRATION; INTRACAUDAL; PERINEURAL AS NEEDED
Status: DISCONTINUED | OUTPATIENT
Start: 2024-04-17 | End: 2024-04-17 | Stop reason: SURG

## 2024-04-17 RX ORDER — CYCLOBENZAPRINE HCL 10 MG
10 TABLET ORAL 3 TIMES DAILY PRN
Status: DISCONTINUED | OUTPATIENT
Start: 2024-04-17 | End: 2024-04-18 | Stop reason: HOSPADM

## 2024-04-17 RX ORDER — PREGABALIN 75 MG/1
75 CAPSULE ORAL ONCE
Status: COMPLETED | OUTPATIENT
Start: 2024-04-17 | End: 2024-04-17

## 2024-04-17 RX ORDER — DEXMEDETOMIDINE HYDROCHLORIDE 4 UG/ML
INJECTION, SOLUTION INTRAVENOUS AS NEEDED
Status: DISCONTINUED | OUTPATIENT
Start: 2024-04-17 | End: 2024-04-17 | Stop reason: SURG

## 2024-04-17 RX ORDER — FENTANYL CITRATE 50 UG/ML
50 INJECTION, SOLUTION INTRAMUSCULAR; INTRAVENOUS
Status: DISCONTINUED | OUTPATIENT
Start: 2024-04-17 | End: 2024-04-17 | Stop reason: HOSPADM

## 2024-04-17 RX ORDER — MORPHINE SULFATE 2 MG/ML
1 INJECTION, SOLUTION INTRAMUSCULAR; INTRAVENOUS EVERY 4 HOURS PRN
Status: DISCONTINUED | OUTPATIENT
Start: 2024-04-17 | End: 2024-04-18 | Stop reason: HOSPADM

## 2024-04-17 RX ORDER — LIDOCAINE HYDROCHLORIDE 10 MG/ML
0.5 INJECTION, SOLUTION EPIDURAL; INFILTRATION; INTRACAUDAL; PERINEURAL ONCE AS NEEDED
Status: COMPLETED | OUTPATIENT
Start: 2024-04-17 | End: 2024-04-17

## 2024-04-17 RX ORDER — METOPROLOL SUCCINATE 50 MG/1
50 TABLET, EXTENDED RELEASE ORAL DAILY
Status: DISCONTINUED | OUTPATIENT
Start: 2024-04-17 | End: 2024-04-18 | Stop reason: HOSPADM

## 2024-04-17 RX ADMIN — TRANEXAMIC ACID 1000 MG: 10 INJECTION, SOLUTION INTRAVENOUS at 08:04

## 2024-04-17 RX ADMIN — Medication 3 ML: at 20:33

## 2024-04-17 RX ADMIN — PROPOFOL 200 MG: 10 INJECTION, EMULSION INTRAVENOUS at 07:35

## 2024-04-17 RX ADMIN — Medication 10 ML: at 14:50

## 2024-04-17 RX ADMIN — PROPOFOL 25 MCG/KG/MIN: 10 INJECTION, EMULSION INTRAVENOUS at 07:40

## 2024-04-17 RX ADMIN — ALPRAZOLAM 1 MG: 1 TABLET ORAL at 16:30

## 2024-04-17 RX ADMIN — METOPROLOL SUCCINATE 50 MG: 50 TABLET, EXTENDED RELEASE ORAL at 16:30

## 2024-04-17 RX ADMIN — HYDROMORPHONE HYDROCHLORIDE 0.5 MG: 1 INJECTION, SOLUTION INTRAMUSCULAR; INTRAVENOUS; SUBCUTANEOUS at 13:19

## 2024-04-17 RX ADMIN — Medication 100 MCG: at 08:59

## 2024-04-17 RX ADMIN — FAMOTIDINE 20 MG: 20 TABLET, FILM COATED ORAL at 19:37

## 2024-04-17 RX ADMIN — LIDOCAINE HYDROCHLORIDE 80 MG: 10 INJECTION, SOLUTION EPIDURAL; INFILTRATION; INTRACAUDAL; PERINEURAL at 07:35

## 2024-04-17 RX ADMIN — DEXMEDETOMIDINE HYDROCHLORIDE IN 0.9% SODIUM CHLORIDE 4 MCG: 4 INJECTION INTRAVENOUS at 09:35

## 2024-04-17 RX ADMIN — MIDAZOLAM HYDROCHLORIDE 1 MG: 1 INJECTION, SOLUTION INTRAMUSCULAR; INTRAVENOUS at 07:26

## 2024-04-17 RX ADMIN — HYDROMORPHONE HYDROCHLORIDE 0.5 MG: 1 INJECTION, SOLUTION INTRAMUSCULAR; INTRAVENOUS; SUBCUTANEOUS at 13:36

## 2024-04-17 RX ADMIN — FAMOTIDINE 20 MG: 20 TABLET, FILM COATED ORAL at 06:53

## 2024-04-17 RX ADMIN — PHENYLEPHRINE HYDROCHLORIDE 0.2 MCG/KG/MIN: 10 INJECTION INTRAVENOUS at 09:26

## 2024-04-17 RX ADMIN — Medication 100 MCG: at 08:01

## 2024-04-17 RX ADMIN — HYDROMORPHONE HYDROCHLORIDE 0.5 MG: 1 INJECTION, SOLUTION INTRAMUSCULAR; INTRAVENOUS; SUBCUTANEOUS at 13:06

## 2024-04-17 RX ADMIN — ACETAMINOPHEN 1000 MG: 500 TABLET ORAL at 06:53

## 2024-04-17 RX ADMIN — OXYCODONE AND ACETAMINOPHEN 1 TABLET: 10; 325 TABLET ORAL at 19:37

## 2024-04-17 RX ADMIN — HYDROMORPHONE HYDROCHLORIDE 0.5 MG: 1 INJECTION, SOLUTION INTRAMUSCULAR; INTRAVENOUS; SUBCUTANEOUS at 13:46

## 2024-04-17 RX ADMIN — DEXMEDETOMIDINE HYDROCHLORIDE IN 0.9% SODIUM CHLORIDE 4 MCG: 4 INJECTION INTRAVENOUS at 10:43

## 2024-04-17 RX ADMIN — DEXMEDETOMIDINE HYDROCHLORIDE IN 0.9% SODIUM CHLORIDE 4 MCG: 4 INJECTION INTRAVENOUS at 08:15

## 2024-04-17 RX ADMIN — SUGAMMADEX 200 MG: 100 INJECTION, SOLUTION INTRAVENOUS at 12:46

## 2024-04-17 RX ADMIN — SODIUM CHLORIDE 2000 MG: 900 INJECTION INTRAVENOUS at 11:40

## 2024-04-17 RX ADMIN — Medication 200 MCG: at 08:41

## 2024-04-17 RX ADMIN — PREGABALIN 75 MG: 75 CAPSULE ORAL at 06:53

## 2024-04-17 RX ADMIN — SODIUM CHLORIDE, POTASSIUM CHLORIDE, SODIUM LACTATE AND CALCIUM CHLORIDE: 600; 310; 30; 20 INJECTION, SOLUTION INTRAVENOUS at 09:07

## 2024-04-17 RX ADMIN — TRANEXAMIC ACID 1000 MG: 10 INJECTION, SOLUTION INTRAVENOUS at 12:08

## 2024-04-17 RX ADMIN — DULOXETINE HYDROCHLORIDE 60 MG: 60 CAPSULE, DELAYED RELEASE ORAL at 16:30

## 2024-04-17 RX ADMIN — ZOLPIDEM TARTRATE 5 MG: 5 TABLET ORAL at 23:13

## 2024-04-17 RX ADMIN — EPHEDRINE SULFATE 5 MG: 50 INJECTION INTRAVENOUS at 08:30

## 2024-04-17 RX ADMIN — ROCURONIUM BROMIDE 20 MG: 10 INJECTION INTRAVENOUS at 10:26

## 2024-04-17 RX ADMIN — SODIUM CHLORIDE, POTASSIUM CHLORIDE, SODIUM LACTATE AND CALCIUM CHLORIDE 9 ML/HR: 600; 310; 30; 20 INJECTION, SOLUTION INTRAVENOUS at 06:53

## 2024-04-17 RX ADMIN — ONDANSETRON 4 MG: 2 INJECTION INTRAMUSCULAR; INTRAVENOUS at 14:49

## 2024-04-17 RX ADMIN — EPHEDRINE SULFATE 5 MG: 50 INJECTION INTRAVENOUS at 08:58

## 2024-04-17 RX ADMIN — EPHEDRINE SULFATE 5 MG: 50 INJECTION INTRAVENOUS at 08:24

## 2024-04-17 RX ADMIN — SODIUM CHLORIDE 2000 MG: 900 INJECTION INTRAVENOUS at 17:18

## 2024-04-17 RX ADMIN — FENTANYL CITRATE 100 MCG: 50 INJECTION, SOLUTION INTRAMUSCULAR; INTRAVENOUS at 07:35

## 2024-04-17 RX ADMIN — ROCURONIUM BROMIDE 20 MG: 10 INJECTION INTRAVENOUS at 09:00

## 2024-04-17 RX ADMIN — ONDANSETRON 4 MG: 2 INJECTION INTRAMUSCULAR; INTRAVENOUS at 12:05

## 2024-04-17 RX ADMIN — Medication 100 MCG: at 08:10

## 2024-04-17 RX ADMIN — OXYCODONE HYDROCHLORIDE 10 MG: 10 TABLET, FILM COATED, EXTENDED RELEASE ORAL at 06:53

## 2024-04-17 RX ADMIN — HYDROCODONE BITARTRATE AND ACETAMINOPHEN 1 TABLET: 7.5; 325 TABLET ORAL at 16:30

## 2024-04-17 RX ADMIN — Medication 100 MCG: at 08:05

## 2024-04-17 RX ADMIN — DEXMEDETOMIDINE HYDROCHLORIDE IN 0.9% SODIUM CHLORIDE 8 MCG: 4 INJECTION INTRAVENOUS at 07:40

## 2024-04-17 RX ADMIN — ROCURONIUM BROMIDE 50 MG: 10 INJECTION INTRAVENOUS at 07:35

## 2024-04-17 RX ADMIN — DEXAMETHASONE SODIUM PHOSPHATE 8 MG: 4 INJECTION, SOLUTION INTRA-ARTICULAR; INTRALESIONAL; INTRAMUSCULAR; INTRAVENOUS; SOFT TISSUE at 07:40

## 2024-04-17 RX ADMIN — Medication 100 MCG: at 08:25

## 2024-04-17 RX ADMIN — SODIUM CHLORIDE, POTASSIUM CHLORIDE, SODIUM LACTATE AND CALCIUM CHLORIDE: 600; 310; 30; 20 INJECTION, SOLUTION INTRAVENOUS at 12:50

## 2024-04-17 RX ADMIN — ALBUMIN (HUMAN): 12.5 INJECTION, SOLUTION INTRAVENOUS at 11:40

## 2024-04-17 RX ADMIN — LIDOCAINE HYDROCHLORIDE 0.5 ML: 10 INJECTION, SOLUTION EPIDURAL; INFILTRATION; INTRACAUDAL; PERINEURAL at 06:53

## 2024-04-17 RX ADMIN — PROCHLORPERAZINE EDISYLATE 5 MG: 5 INJECTION INTRAMUSCULAR; INTRAVENOUS at 16:29

## 2024-04-17 RX ADMIN — Medication 100 MCG: at 08:58

## 2024-04-17 RX ADMIN — DEXMEDETOMIDINE HYDROCHLORIDE IN 0.9% SODIUM CHLORIDE 4 MCG: 4 INJECTION INTRAVENOUS at 08:53

## 2024-04-17 RX ADMIN — SODIUM CHLORIDE 2000 MG: 900 INJECTION INTRAVENOUS at 07:40

## 2024-04-17 RX ADMIN — POTASSIUM CHLORIDE AND SODIUM CHLORIDE 100 ML/HR: 900; 150 INJECTION, SOLUTION INTRAVENOUS at 14:58

## 2024-04-17 RX ADMIN — HYDROCODONE BITARTRATE AND ACETAMINOPHEN 1 TABLET: 7.5; 325 TABLET ORAL at 23:13

## 2024-04-17 NOTE — OP NOTE
PREOPERATIVE DIAGNOSES:   1. Grade 2 L4-L5 spondylolisthesis.  2. Lumbar spinal stenosis L4-L5 and L5-S1.    3. Degenerative disc disease L5-S1.     POSTOPERATIVE DIAGNOSES:   1. Grade 2 L4-L5 spondylolisthesis.   2. Lumbar spine stenosis L4-L5 and L5-S1.   3. Degenerative disc disease L5-S1.     PROCEDURES PERFORMED:   1. Decompressive laminectomy, medial facetectomy and foraminotomy L4-L5 and L5-S1.     2. Segmental instrumentation L4-L5-S1 with DePuy Expedium transpedicular fixation.   3. Transforaminal lumbar interbody fusion cage L4-L5 and L5-S1 with DePuy interbody fusion cage and bone graft.   4. Posterolateral fusion with bone graft L4-L5 and L5-S1.   5. Harvesting of local bone graft from spinous processes and lamina.  6. Use of allograft bone (Vivigen).     SURGEON: Tomas Bashir MD     ASSISTANT: ABEBA BRAXTON (FOX's assistance was required for patient positioning, surgical approach, neural decompression, instrumentation, and wound closure.)     ANESTHESIA: General.     ESTIMATED BLOOD LOSS: 400 mL.     DESCRIPTION OF PROCEDURE: The patient was given a preoperative dose of intravenous antibiotics. She was then given a general anesthetic. She was placed in the prone position on the Darshan table. The back was prepped and draped sterilely.    A midline incision was made. This was carried down to the fascia. The fascia was split and the paraspinal musculature was elevated.  I identified L4-L5 and L5-S1 on C-arm.     Instrumentation was performed first. Pedicle screws were placed bilaterally at L4, L5, and S1. A total of 6 bone screws were placed. This was done under C-arm fluoroscopy guidance. Screws obtained good bony purchase and all looked good on C-arm. Two rods contoured into lordosis were placed in the screws and the appropriate set screws applied.      Neural decompression was performed next. I started at L4-L5.  A midline laminectomy was performed of L4. There was a moderate central stenosis. Bilateral  medial facetectomies were performed. Foraminotomies were performed. The neural elements appeared decompressed. I then moved to L5-S1. A similar decompression was performed. A midline laminectomy was performed and bilateral foraminotomies were performed. The neural elements appeared decompressed.     Transforaminal lumbar interbody fusion was performed from the left side. I started at L4-L5. An annular window was created on the left side between the exiting nerve root above and the traversing nerve root medially. The disc was degenerative. Complete diskectomy was performed using endplate vani, curets, and pituitaries. I then utilized a DePuy interbody fusion cage. Prior to placing the cage I copiously irrigated the disc interspace. I then placed bone graft in the anterior disc space. The DePuy interbody fusion cage was then packed with bone graft. It was impacted tangentially across the disc space. Under C-arm it appeared in good position and provided good distraction of the interspace. It locked into place well.     I then moved caudal to L5-S1. A similar interbody fusion was performed. This was done through the left side. An annular window was created. A complete diskectomy was performed using pituitaries, curets, and endplate vani. Again, the disc was degenerative. I lightly decorticated the endplates. Prior to placing the interbody fusion cage I packed bone graft in the anterior disc space. The DePuy cage was then packed with bone graft. It was impacted tangentially across the disc space and locked into place well. It looked in good position on C-arm. I then applied compression across the screws to lock the cages in place. The construct appeared solid.     A posterolateral fusion was performed. The transverse processes and sacra ala were decorticated. I then packed bone graft in the posterolateral gutters on the left and right sides of L4-L5 and L5-S1.      It should be noted bone graft was prepared from  locally harvested bone. Bone from the spinous processes and lamina was morcellized. This was mixed with Vivigen allograft. The above material was used for the fusion bone.     Final torque tightening was done of all the screws. Final C-arm images showed all hardware in good position. It should be noted that prior to bone graft I did a final copious irrigation. After irrigating I sprinkled some vancomycin powder in the wound. Exposed dura was covered with thrombin-soaked Gelfoam.     A deep Hemovac drain was placed. It was brought out through a separate stab incision. The wound was then closed in layers using Vicryl. A sterile dressing was applied. The patient was awakened and transported to the recovery room in stable condition.

## 2024-04-17 NOTE — ANESTHESIA PREPROCEDURE EVALUATION
Anesthesia Evaluation     Patient summary reviewed and Nursing notes reviewed                Airway   Mallampati: II  TM distance: >3 FB  Neck ROM: full  No difficulty expected  Dental - normal exam     Pulmonary - normal exam   (+) a smoker Current, vape,  Cardiovascular - normal exam    (+) hypertension      Neuro/Psych- negative ROS    ROS Comment: Bell's palsy - right  GI/Hepatic/Renal/Endo    (+) GERD    Musculoskeletal (-) negative ROS    Abdominal  - normal exam    Bowel sounds: normal.   Substance History - negative use     OB/GYN negative ob/gyn ROS         Other                      Anesthesia Plan    ASA 2     general     intravenous induction     Anesthetic plan, risks, benefits, and alternatives have been provided, discussed and informed consent has been obtained with: patient.    Plan discussed with CRNA.    CODE STATUS:

## 2024-04-17 NOTE — H&P
"Pre-Op H&P  Estefany Bryan  [id number]  1975    Chief complaint: \"Left leg pain\"    HPI:    Patient is a 48 y.o.female who presents with persistent, unrelenting left leg pain.  It is neurogenic in nature radiating from the low back.  She has failed conservative therapy.  Including, nonsteroidal anti-inflammatories, physical therapy, tincture of time.  After failing conservative therapy she is now admitted for lumbar decompression and fusion.    Review of Systems:  General ROS: negative for chills, fever or skin lesions;  No changes since last office visit.  Neg for recent sick exposure  Cardiovascular ROS: no chest pain or dyspnea on exertion  Respiratory ROS: no cough, shortness of breath, or wheezing    Allergies:   Allergies   Allergen Reactions    Chlorhexidine Rash     Pt. developed redness and burning sensation after using.    Codeine GI Intolerance     If take with nexium pt is okay  or if she can have zofran with    Guaifenesin & Derivatives Irritability       Home Meds:    No current facility-administered medications on file prior to encounter.     Current Outpatient Medications on File Prior to Encounter   Medication Sig Dispense Refill    ALPRAZolam (XANAX) 1 MG tablet Take 1 tablet by mouth 3 (Three) Times a Day. Patient DC 1 year prior.  2    metoprolol succinate XL (TOPROL-XL) 50 MG 24 hr tablet Take 1 tablet by mouth Daily.      omeprazole (priLOSEC) 20 MG capsule Take 1 capsule by mouth Daily.      PARoxetine (PAXIL) 10 MG tablet Take 1 tablet by mouth Every Morning. Patient not taken in over a year.      Cholecalciferol (Vitamin D3) 10 MCG (400 UNIT) capsule Take  by mouth Every Morning.      DULoxetine (CYMBALTA) 60 MG capsule Take 1 capsule by mouth Daily.      [DISCONTINUED] lisinopril (PRINIVIL,ZESTRIL) 10 MG tablet Take 10 mg by mouth Daily.         PMH:   Past Medical History:   Diagnosis Date    Anxiety     Arthritis     IN SI JOINT    Breast cancer, right 01/10/2018    s/p " bilateral mastectomy; chemo and radiation as well     Depression     GERD (gastroesophageal reflux disease) 01/23/2018    chemo related     H/O Bell's palsy     oct 25 2015 or 2016    High blood pressure     History of chemotherapy     last therapy5/2018    History of kidney stones     History of radiation therapy     Itchy eyes     bilat - from bells palsy     Low back pain     DUE TO ARTHRITIS    Migraine     HAS NOT HAD A MIGRAINE IN A YEAR    Muscle ache     all over     SOBOE (shortness of breath on exertion)     Tachycardia     Tattoos     BACK AND LEFT HIP    Wears glasses      PSH:    Past Surgical History:   Procedure Laterality Date    BREAST BIOPSY Bilateral     and a lymphnode on the right. also skin punch biopsies    BREAST RECONSTRUCTION WITH LATISSIMUS MUSCLE FLAP AND IMPLANT INSERTION Bilateral 6/16/2020    Procedure: BREAST LATISSIMUS DORSI FLAP WITH TISSUE EXPANDER RIGHT, DELAYED LEFT BREAST RECONSTRUCTION WITH TISSUE EXPANDER AND ALLODERM;  Surgeon: Pablo Lockhart MD;  Location:  [facility];  Service: Plastics;  Laterality: Bilateral;    BREAST RECONSTRUCTION, BREAST TISSUE EXPANDER REMOVAL, IMPLANT INSERTION Bilateral 9/25/2020    Procedure: TISSUE EXPANDER EXCHANGE FOR PERMANENT IMPLANTS BILATERAL;  Surgeon: Pablo Lockhart MD;  Location:  [facility];  Service: Plastics;  Laterality: Bilateral;    COLONOSCOPY  2015    x2    DILATATION AND CURETTAGE      several with several miscarriages     ENDOMETRIAL ABLATION      ENDOSCOPY  2015    MASTECTOMY WITH SENTINEL NODE BIOPSY AND AXILLARY NODE DISSECTION Right 6/21/2018    Procedure: RIGHT SKIN SPARING BREAST MASTECTOMY WITH RIGHT SENTINEL NODE BIOPSY, PROPHYLATIC SKIN SPARING MASTECTOMY, AND REMOVAL OF PORT;  Surgeon: Sandeep Canela MD;  Location:  [facility];  Service: General bilateral mastectomy    PORTACATH PLACEMENT      TONSILLECTOMY      unsure about anoids - 1990     TOTAL LAPAROSCOPIC HYSTERECTOMY N/A 9/5/2019    Procedure: TOTAL  "ROBOTIC HYSTERECTOMY WITH BILATERAL SALPINGO-OOPHORECTOMY, EXCISION IOF VULVAR SKIN TAG;  Surgeon: Li Faye MD;  Location: [facility];  Service: DaVinc    VENOUS ACCESS DEVICE (PORT) REMOVAL      WISDOM TOOTH EXTRACTION      all 1994     Patient denies allergy to contrast dye or latex  Immunization History:  Influenza: No  Pneumococcal: No  Tetanus: Up-to-date    Social History:   Tobacco:   Social History     Tobacco Use   Smoking Status Former    Current packs/day: 0.00    Average packs/day: 0.5 packs/day for 20.0 years (10.0 ttl pk-yrs)    Types: Electronic Cigarette, Cigarettes    Start date:     Quit date:     Years since quittin.3   Smokeless Tobacco Never   Tobacco Comments    quit vaping 4/15/20; quit cigarettes in       Alcohol:     Social History     Substance and Sexual Activity   Alcohol Use No       Vitals:           BP (P) 131/69 (BP Location: Left arm, Patient Position: Lying)   Pulse (P) 100   Temp (P) 97.6 °F (36.4 °C) (Temporal)   Resp (P) 20   Ht (P) 172.7 cm (68\")   Wt (P) 79.4 kg (175 lb)   LMP 2017 Comment: last mammogram 2018  SpO2 (P) 100%   BMI (P) 26.61 kg/m²     Physical Exam:  General Appearance:    Alert, cooperative, no distress, appears stated age   Head:    Normocephalic, without obvious abnormality, atraumatic   Lungs:   To auscultation bilaterally to the bases    Heart: S 1 S2 without rubs murmurs or gallops    Abdomen:  Soft, nontender, bowel sounds present throughout   Breast Exam:    deferred   Genitalia:    deferred   Extremities:   Extremities normal, atraumatic, no cyanosis or edema   Skin:   Skin color, texture, turgor normal, no rashes or lesions   Neurologic:   Grossly intact   Results Review  LABS:  Lab Results   Component Value Date    WBC 6.55 04/10/2024    HGB 14.0 04/10/2024    HCT 41.7 04/10/2024    MCV 91.4 04/10/2024     04/10/2024    NEUTROABS 3.02 11/10/2022    GLUCOSE 82 11/10/2022    BUN 9 11/10/2022    " CREATININE 0.83 11/10/2022    EGFRIFNONA 67 11/04/2021     11/10/2022    K 3.8 04/10/2024     11/10/2022    CO2 25.0 11/10/2022    CALCIUM 9.5 11/10/2022    ALBUMIN 4.20 11/10/2022    AST 21 11/10/2022    ALT 37 (H) 11/10/2022    BILITOT 0.2 11/10/2022       RADIOLOGY:  No radiology results for the last 3 days     I reviewed the patient's new clinical results.    Cancer Staging (if applicable)  Cancer Patient: __ yes __no __unknown; If yes, clinical stage T:__ N:__M:__, stage group or __N/A    Impression: Neurogenic claudication left lower extremity  History of breast cancer  Anxiety    Plan: Bar laminectomy with fusion L4-5      JAYCEE Sesay   04/17/24   6:39 AM EDT

## 2024-04-17 NOTE — H&P
Patient Name: Estefany Bryan  MRN: [id number]  : 1975  DOS: 2024    Attending: Tomas Bashir MD    Primary Care Provider: Henry Miles MD      Chief complaint:  Back pain    Subjective   Patient is a pleasant 48 y.o. female presented for scheduled surgery by Dr. Bashir.  She underwent lumbar fusion under general anesthesia.  She tolerated surgery well and was admitted for further medical management.  Her back has been painful for many years.  The pain radiates down bilateral lower extremities, L>R.  She denies saddle anesthesia.    When seen postop she is having nausea and vomiting.  She has adequate pain control.  She denies shortness of breath or chest pain.  No history of DVT or PE.    Allergies:  Allergies   Allergen Reactions    Chlorhexidine Rash     Pt. developed redness and burning sensation after using.    Codeine GI Intolerance     If take with nexium pt is okay  or if she can have zofran with    Guaifenesin & Derivatives Irritability       Meds:  Medications Prior to Admission   Medication Sig Dispense Refill Last Dose    Acetaminophen (TYLENOL ARTHRITIS PAIN PO) Take  by mouth.   2024 at 1730    ALPRAZolam (XANAX) 1 MG tablet Take 1 tablet by mouth 3 (Three) Times a Day. Patient DC 1 year prior.  2 Patient Taking Differently    Ibuprofen (MOTRIN PO) Take  by mouth.   Past Month    LYSINE PO Take  by mouth.   2024 at 0800    metoprolol succinate XL (TOPROL-XL) 50 MG 24 hr tablet Take 1 tablet by mouth Daily.   2024 at 1130    omeprazole (priLOSEC) 20 MG capsule Take 1 capsule by mouth Daily.   Past Month    ondansetron (ZOFRAN) 4 MG tablet Take 1 tablet by mouth Every 8 (Eight) Hours As Needed. for nausea   Past Month    PARoxetine (PAXIL) 10 MG tablet Take 1 tablet by mouth Every Morning. Patient not taken in over a year.   Patient Taking Differently    Cholecalciferol (Vitamin D3) 10 MCG (400 UNIT) capsule Take  by mouth Every Morning.   More than a month     DULoxetine (CYMBALTA) 60 MG capsule Take 1 capsule by mouth Daily.   4/15/2024 at 1130    Naproxen Sodium (ALEVE PO) Take  by mouth.   4/13/2024    zolpidem (AMBIEN) 5 MG tablet Take 1 tablet by mouth At Night As Needed for Sleep.   4/14/2024 at 2130         History:   Past Medical History:   Diagnosis Date    Anxiety     Arthritis     IN SI JOINT    Breast cancer, right 01/10/2018    s/p bilateral mastectomy; chemo and radiation as well     Depression     GERD (gastroesophageal reflux disease) 01/23/2018    chemo related     H/O Bell's palsy     oct 25 2015 or 2016    High blood pressure     History of chemotherapy     last therapy5/2018    History of kidney stones     History of radiation therapy     Itchy eyes     bilat - from bells palsy     Low back pain     DUE TO ARTHRITIS    Migraine     HAS NOT HAD A MIGRAINE IN A YEAR    Muscle ache     all over     SOBOE (shortness of breath on exertion)     Tachycardia     Tattoos     BACK AND LEFT HIP    Wears glasses      Past Surgical History:   Procedure Laterality Date    BREAST BIOPSY Bilateral     and a lymphnode on the right. also skin punch biopsies    BREAST RECONSTRUCTION WITH LATISSIMUS MUSCLE FLAP AND IMPLANT INSERTION Bilateral 6/16/2020    Procedure: BREAST LATISSIMUS DORSI FLAP WITH TISSUE EXPANDER RIGHT, DELAYED LEFT BREAST RECONSTRUCTION WITH TISSUE EXPANDER AND ALLODERM;  Surgeon: Pablo Lockhart MD;  Location: [facility];  Service: Plastics;  Laterality: Bilateral;    BREAST RECONSTRUCTION, BREAST TISSUE EXPANDER REMOVAL, IMPLANT INSERTION Bilateral 9/25/2020    Procedure: TISSUE EXPANDER EXCHANGE FOR PERMANENT IMPLANTS BILATERAL;  Surgeon: Pablo Lockhart MD;  Location:  [facility];  Service: Plastics;  Laterality: Bilateral;    COLONOSCOPY  2015    x2    DILATATION AND CURETTAGE      several with several miscarriages     ENDOMETRIAL ABLATION      ENDOSCOPY  2015    MASTECTOMY WITH SENTINEL NODE BIOPSY AND AXILLARY NODE DISSECTION Right  "2018    Procedure: RIGHT SKIN SPARING BREAST MASTECTOMY WITH RIGHT SENTINEL NODE BIOPSY, PROPHYLATIC SKIN SPARING MASTECTOMY, AND REMOVAL OF PORT;  Surgeon: Sandeep Canela MD;  Location:  [facility];  Service: General bilateral mastectomy    PORTACATH PLACEMENT      TONSILLECTOMY      unsure about anoids -      TOTAL LAPAROSCOPIC HYSTERECTOMY N/A 2019    Procedure: TOTAL ROBOTIC HYSTERECTOMY WITH BILATERAL SALPINGO-OOPHORECTOMY, EXCISION IOF VULVAR SKIN TAG;  Surgeon: Li Faye MD;  Location:  [facility];  Service: DaVinci    VENOUS ACCESS DEVICE (PORT) REMOVAL      WISDOM TOOTH EXTRACTION      all -      Family History   Problem Relation Age of Onset    Breast cancer Cousin 25    Heart disease Mother     No Known Problems Father      Social History     Tobacco Use    Smoking status: Former     Current packs/day: 0.00     Average packs/day: 0.5 packs/day for 20.0 years (10.0 ttl pk-yrs)     Types: Electronic Cigarette, Cigarettes     Start date:      Quit date:      Years since quittin.3    Smokeless tobacco: Never    Tobacco comments:     quit vaping 4/15/20; quit cigarettes in    Vaping Use    Vaping status: Every Day    Start date: 2011    Last attempt to quit: 4/15/2020    Substances: Nicotine   Substance Use Topics    Alcohol use: No    Drug use: Yes     Comment: CBD DELTA 8 GUMMIES NIGHTLY   She is  with 4 children.    Review of Systems  Pertinent items are noted in HPI, all other systems reviewed and negative    Vital Signs  BP 99/87 (BP Location: Left arm, Patient Position: Sitting)   Pulse (!) 122   Temp 98.1 °F (36.7 °C) (Axillary)   Resp 18   Ht 172.7 cm (68\")   Wt 79.4 kg (175 lb)   LMP 2017 Comment: last mammogram 2018  SpO2 99%   BMI 26.61 kg/m²     Physical Exam:    General Appearance:    Alert, cooperative, in no acute distress   Head:    Normocephalic, without obvious abnormality, atraumatic   Eyes:            Lids and lashes " normal, conjunctivae and sclerae normal, no   icterus, no pallor, corneas clear,    Ears:    Ears appear intact with no abnormalities noted   Throat:   No oral lesions, no thrush, oral mucosa moist   Back: Surgical dressing CDI.  Hemovac present   Lungs:     Clear to auscultation,respirations regular, even and unlabored    Heart:    Regular rhythm and normal rate, normal S1 and S2, no murmur, no gallop   Abdomen:     Normal bowel sounds, no masses, no organomegaly, soft non-tender, non-distended, no guarding, no rebound  tenderness   Genitalia:    Deferred   Extremities:   Moves all extremities well, no edema, no cyanosis, no  redness   Pulses:   Pulses palpable and equal bilaterally   Skin:   No bleeding, bruising or rash   Neurologic:   Cranial nerves 2 - 12 grossly intact. Flexion and dorsiflexion intact bilateral feet.        I reviewed the patient's new clinical results.     Lab Results   Component Value Date    HGBA1C 5.20 04/10/2024      Latest Reference Range & Units 04/10/24 09:45   WBC 3.40 - 10.80 10*3/mm3 6.55   RBC 3.77 - 5.28 10*6/mm3 4.56   Hemoglobin 12.0 - 15.9 g/dL 14.0   Hematocrit 34.0 - 46.6 % 41.7   Platelets 140 - 450 10*3/mm3 302   RDW 12.3 - 15.4 % 12.9   MCV 79.0 - 97.0 fL 91.4   MCH 26.6 - 33.0 pg 30.7   MCHC 31.5 - 35.7 g/dL 33.6   MPV 6.0 - 12.0 fL 10.4       Assessment and Plan:     S/P lumbar fusion    Back pain      Plan  1. PT-ambulate  2. Pain control-prns   3. IS-encourage  4. DVT proph- [facility]  5. Bowel regimen  6. Resume home medications as appropriate  7. Monitor post-op labs  8. DC planning for home      ASTRID Christensen  04/17/24  16:27 EDT

## 2024-04-17 NOTE — ANESTHESIA PROCEDURE NOTES
Airway  Urgency: elective    Date/Time: 4/17/2024 7:37 AM  Airway not difficult    General Information and Staff    Patient location during procedure: OR  CRNA/CAA: La Henson CRNA    Indications and Patient Condition  Indications for airway management: airway protection    Preoxygenated: yes  MILS not maintained throughout  Mask difficulty assessment: 1 - vent by mask    Final Airway Details  Final airway type: endotracheal airway      Successful airway: ETT  Cuffed: yes   Successful intubation technique: direct laryngoscopy  Facilitating devices/methods: intubating stylet  Endotracheal tube insertion site: oral  Blade: Maurice  Blade size: 3  ETT size (mm): 7.0  Cormack-Lehane Classification: grade I - full view of glottis  Placement verified by: chest auscultation and capnometry   Measured from: lips  ETT/EBT  to lips (cm): 21  Number of attempts at approach: 1  Assessment: lips, teeth, and gum same as pre-op and atraumatic intubation    Additional Comments  Negative epigastric sounds, Breath sound equal bilaterally with symmetric chest rise and fall, dentition and lips unchanged

## 2024-04-17 NOTE — ANESTHESIA POSTPROCEDURE EVALUATION
Patient: Estefany Bryan    Procedure Summary       Date: 04/17/24 Room / Location:  [facility] [address] [place] [address] [facility]    Anesthesia Start: 0730 Anesthesia Stop: 1258    Procedure: LUMBAR LAMINECTOMY WITH FUSION L4-5 (Spine Lumbar) Diagnosis:     Surgeons: Tomas Bashir MD Provider: Leoncio Stock MD    Anesthesia Type: general ASA Status: 2            Anesthesia Type: general    Vitals:    Temp: 97.1 F  HR: 74  RR: 16  BP: 131/65  O2: 100%      Post Anesthesia Care and Evaluation    Patient location during evaluation: PACU  Patient participation: waiting for patient participation  Level of consciousness: sleepy but conscious  Pain score: 0  Pain management: adequate    Airway patency: patent  Anesthetic complications: No anesthetic complications  PONV Status: none  Cardiovascular status: hemodynamically stable and acceptable  Respiratory status: nonlabored ventilation, acceptable, nasal cannula and oral airway  Hydration status: acceptable        
[de-identified] : RAFAEL PORRAS is a 10 year yo male w/ FA to [place], [place], [employer], , AS AR/AC. Recently the mother notes he has been reacting to beans with hives.

## 2024-04-18 VITALS
BODY MASS INDEX: 26.52 KG/M2 | RESPIRATION RATE: 16 BRPM | SYSTOLIC BLOOD PRESSURE: 128 MMHG | WEIGHT: 175 LBS | HEART RATE: 78 BPM | OXYGEN SATURATION: 94 % | DIASTOLIC BLOOD PRESSURE: 79 MMHG | TEMPERATURE: 97.8 F | HEIGHT: 68 IN

## 2024-04-18 PROBLEM — F41.9 ANXIETY AND DEPRESSION: Status: ACTIVE | Noted: 2024-04-18

## 2024-04-18 PROBLEM — K21.9 GERD WITHOUT ESOPHAGITIS: Status: ACTIVE | Noted: 2024-04-18

## 2024-04-18 PROBLEM — F32.A ANXIETY AND DEPRESSION: Status: ACTIVE | Noted: 2024-04-18

## 2024-04-18 LAB
ANION GAP SERPL CALCULATED.3IONS-SCNC: 6 MMOL/L (ref 5–15)
BUN SERPL-MCNC: 9 MG/DL (ref 6–20)
BUN/CREAT SERPL: 12.5 (ref 7–25)
CALCIUM SPEC-SCNC: 8.1 MG/DL (ref 8.6–10.5)
CHLORIDE SERPL-SCNC: 111 MMOL/L (ref 98–107)
CO2 SERPL-SCNC: 25 MMOL/L (ref 22–29)
CREAT SERPL-MCNC: 0.72 MG/DL (ref 0.57–1)
DEPRECATED RDW RBC AUTO: 45 FL (ref 37–54)
EGFRCR SERPLBLD CKD-EPI 2021: 103.3 ML/MIN/1.73
ERYTHROCYTE [DISTWIDTH] IN BLOOD BY AUTOMATED COUNT: 13.2 % (ref 12.3–15.4)
GLUCOSE SERPL-MCNC: 98 MG/DL (ref 65–99)
HCT VFR BLD AUTO: 26.7 % (ref 34–46.6)
HGB BLD-MCNC: 8.8 G/DL (ref 12–15.9)
MCH RBC QN AUTO: 30.7 PG (ref 26.6–33)
MCHC RBC AUTO-ENTMCNC: 33 G/DL (ref 31.5–35.7)
MCV RBC AUTO: 93 FL (ref 79–97)
PLATELET # BLD AUTO: 195 10*3/MM3 (ref 140–450)
PMV BLD AUTO: 11 FL (ref 6–12)
POTASSIUM SERPL-SCNC: 4.6 MMOL/L (ref 3.5–5.2)
RBC # BLD AUTO: 2.87 10*6/MM3 (ref 3.77–5.28)
SODIUM SERPL-SCNC: 142 MMOL/L (ref 136–145)
WBC NRBC COR # BLD AUTO: 10.71 10*3/MM3 (ref 3.4–10.8)

## 2024-04-18 PROCEDURE — 80048 BASIC METABOLIC PNL TOTAL CA: CPT | Performed by: NURSE PRACTITIONER

## 2024-04-18 PROCEDURE — 97116 GAIT TRAINING THERAPY: CPT

## 2024-04-18 PROCEDURE — 97535 SELF CARE MNGMENT TRAINING: CPT | Performed by: OCCUPATIONAL THERAPIST

## 2024-04-18 PROCEDURE — 97165 OT EVAL LOW COMPLEX 30 MIN: CPT | Performed by: OCCUPATIONAL THERAPIST

## 2024-04-18 PROCEDURE — 97161 PT EVAL LOW COMPLEX 20 MIN: CPT

## 2024-04-18 PROCEDURE — 85027 COMPLETE CBC AUTOMATED: CPT | Performed by: NURSE PRACTITIONER

## 2024-04-18 PROCEDURE — 25010000002 CEFAZOLIN PER 500 MG: Performed by: ORTHOPAEDIC SURGERY

## 2024-04-18 RX ORDER — NALOXONE HYDROCHLORIDE 4 MG/.1ML
SPRAY NASAL
Qty: 2 EACH | Refills: 0 | Status: SHIPPED | OUTPATIENT
Start: 2024-04-18

## 2024-04-18 RX ORDER — HYDROCODONE BITARTRATE AND ACETAMINOPHEN 10; 325 MG/1; MG/1
1 TABLET ORAL EVERY 4 HOURS PRN
Qty: 50 TABLET | Refills: 0 | Status: SHIPPED | OUTPATIENT
Start: 2024-04-18

## 2024-04-18 RX ORDER — DOCUSATE SODIUM 100 MG/1
100 CAPSULE, LIQUID FILLED ORAL 2 TIMES DAILY
Qty: 30 CAPSULE | Refills: 0 | Status: SHIPPED | OUTPATIENT
Start: 2024-04-18 | End: 2024-05-03

## 2024-04-18 RX ADMIN — HYDROCODONE BITARTRATE AND ACETAMINOPHEN 1 TABLET: 7.5; 325 TABLET ORAL at 13:42

## 2024-04-18 RX ADMIN — SODIUM CHLORIDE 2000 MG: 900 INJECTION INTRAVENOUS at 02:03

## 2024-04-18 RX ADMIN — DULOXETINE HYDROCHLORIDE 60 MG: 60 CAPSULE, DELAYED RELEASE ORAL at 09:17

## 2024-04-18 RX ADMIN — METOPROLOL SUCCINATE 50 MG: 50 TABLET, EXTENDED RELEASE ORAL at 09:17

## 2024-04-18 RX ADMIN — FAMOTIDINE 20 MG: 20 TABLET, FILM COATED ORAL at 09:17

## 2024-04-18 RX ADMIN — OXYCODONE AND ACETAMINOPHEN 1 TABLET: 10; 325 TABLET ORAL at 05:09

## 2024-04-18 RX ADMIN — OXYCODONE AND ACETAMINOPHEN 1 TABLET: 10; 325 TABLET ORAL at 10:50

## 2024-04-18 RX ADMIN — HYDROCODONE BITARTRATE AND ACETAMINOPHEN 1 TABLET: 7.5; 325 TABLET ORAL at 09:17

## 2024-04-18 RX ADMIN — PANTOPRAZOLE SODIUM 40 MG: 40 TABLET, DELAYED RELEASE ORAL at 05:09

## 2024-04-18 NOTE — PLAN OF CARE
Problem: Adult Inpatient Plan of Care  Goal: Absence of Hospital-Acquired Illness or Injury  Intervention: Prevent and Manage VTE (Venous Thromboembolism) Risk  Recent Flowsheet Documentation  Taken 4/18/2024 0200 by Socorro Wheatley RN  Activity Management: activity encouraged  Taken 4/18/2024 0000 by Socorro Wheatley RN  Activity Management: activity encouraged  Taken 4/17/2024 2200 by Socorro hWeatley RN  Activity Management: activity encouraged  Taken 4/17/2024 2000 by Socorro Wheatley RN  Activity Management: activity encouraged  VTE Prevention/Management:   bilateral   sequential compression devices on     Problem: Adult Inpatient Plan of Care  Goal: Absence of Hospital-Acquired Illness or Injury  Intervention: Prevent Infection  Recent Flowsheet Documentation  Taken 4/18/2024 0200 by Socorro Wheatlye RN  Infection Prevention: rest/sleep promoted  Taken 4/18/2024 0000 by Socorro Wheatley RN  Infection Prevention: rest/sleep promoted  Taken 4/17/2024 2200 by Socorro Wheatley RN  Infection Prevention: rest/sleep promoted  Taken 4/17/2024 2000 by Socorro Wheatley RN  Infection Prevention: rest/sleep promoted     Problem: Adult Inpatient Plan of Care  Goal: Optimal Comfort and Wellbeing  Intervention: Monitor Pain and Promote Comfort  Recent Flowsheet Documentation  Taken 4/17/2024 2313 by Socorro Wheatley RN  Pain Management Interventions:   see MAR   relaxation techniques promoted   position adjusted   pillow support provided  Taken 4/17/2024 2000 by Socorro Wheatley RN  Pain Management Interventions: see MAR  Intervention: Provide Person-Centered Care  Recent Flowsheet Documentation  Taken 4/17/2024 2000 by Socorro Wheatley RN  Trust Relationship/Rapport:   care explained   choices provided   reassurance provided   thoughts/feelings acknowledged   Goal Outcome Evaluation:   Pt is A&OX4 with VSS and some c/o pain requiring PRN medications. she appears to have slept pretty well throughout shift. Will CTM and provide care.

## 2024-04-18 NOTE — DISCHARGE SUMMARY
Patient Name: Estefany Bryan  MRN: [id number]  : 1975  DOS: 2024    Attending: Tomas Bashir MD    Primary Care Provider: Henry Miles MD    Date of Admission:.2024  5:15 AM    Date of Discharge:  2024    Discharge Diagnosis:   S/P lumbar fusion    Back pain    Anxiety and depression    GERD without esophagitis      Hospital Course    At Admit:    Patient is a pleasant 48 y.o. female presented for scheduled surgery by Dr. Bashir.  She underwent lumbar fusion under general anesthesia.  She tolerated surgery well and was admitted for further medical management.  Her back has been painful for many years.  The pain radiates down bilateral lower extremities, L>R.  She denies saddle anesthesia.     When seen postop she is having nausea and vomiting.  She has adequate pain control.  She denies shortness of breath or chest pain.  No history of DVT or PE.    After admit:    Patient was provided pain medications as needed for pain control.      Adjustments were made to pain medications to optimize postop pain management.   Risks and benefits of opiate medications discussed with patient.  James report in chart was reviewed prior to discharge.    Patient was seen by PT and has progressed well over her stay.    She used an IS for atelectasis prophylaxis and mechanicals for DVT prophylaxis.  Home medications were resumed as appropriate, and labs were monitored and remained fairly stable.     With the progress she has made, Ms. Rouse is ready for DC home today.    Discussed with patient regarding plan and she shows understanding and agreement.       Pain medication at discharge per Dr. Bashir.      Procedures Performed      PREOPERATIVE DIAGNOSES:   1. Grade 2 L4-L5 spondylolisthesis.  2. Lumbar spinal stenosis L4-L5 and L5-S1.    3. Degenerative disc disease L5-S1.      POSTOPERATIVE DIAGNOSES:   1. Grade 2 L4-L5 spondylolisthesis.   2. Lumbar spine stenosis L4-L5 and L5-S1.   3.  Degenerative disc disease L5-S1.      PROCEDURES PERFORMED:   1. Decompressive laminectomy, medial facetectomy and foraminotomy L4-L5 and L5-S1.     2. Segmental instrumentation L4-L5-S1 with DePuy Expedium transpedicular fixation.   3. Transforaminal lumbar interbody fusion cage L4-L5 and L5-S1 with DePuy interbody fusion cage and bone graft.   4. Posterolateral fusion with bone graft L4-L5 and L5-S1.   5. Harvesting of local bone graft from spinous processes and lamina.  6. Use of allograft bone (Vivigen).      SURGEON: Tomas Bashir MD          Pertinent Test Results:    I reviewed the patient's new clinical results.   Results from last 7 days   Lab Units 04/18/24  0510   WBC 10*3/mm3 10.71   HEMOGLOBIN g/dL 8.8*   HEMATOCRIT % 26.7*   PLATELETS 10*3/mm3 195     Results from last 7 days   Lab Units 04/18/24  0510   SODIUM mmol/L 142   POTASSIUM mmol/L 4.6   CHLORIDE mmol/L 111*   CO2 mmol/L 25.0   BUN mg/dL 9   CREATININE mg/dL 0.72   CALCIUM mg/dL 8.1*   GLUCOSE mg/dL 98     I reviewed the patient's new imaging including images and reports.      Physical therapy:   Nelsy Mock, PT   Physical Therapist  Specialty: Physical Therapy     Plan of Care     Signed     Date of Service: 04/18/24 1102  Creation Time: 04/18/24 1500     Signed         Goal Outcome Evaluation:  Plan of Care Reviewed With: patient, spouse  Progress: no change  Outcome Evaluation: PT eval completed. Patient presents w/ post-surgical pain and weakness, mild balance deficits, mildly unsteady gait, and is below her baseline. She completed bed mobility, transfers, and household ambulation w/ RW and CGA. Pt. demonstrates improved safety and activity tolerance w/ use of RW. Recommend home w/ 24/7 assist, RW, and HHPT when medically appropriate.        Anticipated Discharge Disposition (PT): home with 24/7 care, home with home health                 OT:  Date of Service: 04/18/24 1153  Creation Time: 04/18/24 1153     Signed         Goal  "Outcome Evaluation:  Plan of Care Reviewed With: patient, spouse  Outcome Evaluation: OT educated pt and spouse on spinal precautions and incorporation into ADL routine, issued necessary AE and educated on use. OT educated pt on home safety, issued and reviewed handouts. She completed bed mobility with CGA, in-room mobility with CGA and LB dressing with supervision. Recommend DC home with initial 24/7 assist and bath bench and RW.        Anticipated Discharge Disposition (OT): home with 24/7 care                  Discharge Assessment:       Visit Vitals  /79   Pulse 78   Temp 97.8 °F (36.6 °C) (Oral)   Resp 16   Ht 172.7 cm (68\")   Wt 79.4 kg (175 lb)   LMP 2017 Comment: last mammogram 2018   SpO2 94%   BMI 26.61 kg/m²     Temp (24hrs), Av.5 °F (36.4 °C), Min:97.1 °F (36.2 °C), Max:98.1 °F (36.7 °C)      General Appearance:    Alert, cooperative, in no acute distress   Lungs:     Clear to auscultation,respirations regular, even and unlabored    Heart:    Regular rhythm and normal rate, normal S1 and S2    Abdomen:     Normal bowel sounds, no masses, no organomegaly, soft  non-tender, non-distended, no guarding, no rebound tenderness   Extremities:   Moves all extremities well, no edema, no cyanosis, no redness   Pulses:   Pulses palpable and equal bilaterally   Skin:   No bleeding, bruising or rash.   Back dressing CDI. ( Drain out prior to dc)   Neurologic:   Cranial nerves 2 - 12 grossly intact, sensation intact, no gross deficit. Flexion and dorsiflexion intact bilateral feet.       Discharge Disposition: home.         Discharge Medications        New Medications        Instructions Start Date   docusate sodium 100 MG capsule  Commonly known as: COLACE   100 mg, Oral, 2 Times Daily      HYDROcodone-acetaminophen  MG per tablet  Commonly known as: Norco   1 tablet, Oral, Every 4 Hours PRN      naloxone 4 MG/0.1ML nasal spray  Commonly known as: NARCAN   Call 911. Don't prime. [place] in 1 " nostril for overdose. Repeat in 2-3 minutes in other nostril if no or minimal breathing/responsiveness.             Continue These Medications        Instructions Start Date   ALPRAZolam 1 MG tablet  Commonly known as: XANAX   1 mg, Oral, 3 Times Daily, Patient DC 1 year prior.       DULoxetine 60 MG capsule  Commonly known as: CYMBALTA   60 mg, Oral, Daily      LYSINE PO   Oral      metoprolol succinate XL 50 MG 24 hr tablet  Commonly known as: TOPROL-XL   50 mg, Oral, Daily      omeprazole 20 MG capsule  Commonly known as: priLOSEC   20 mg, Oral, Daily      ondansetron 4 MG tablet  Commonly known as: ZOFRAN   4 mg, Oral, Every 8 Hours PRN, for nausea      PARoxetine 10 MG tablet  Commonly known as: PAXIL   10 mg, Oral, Every Morning, Patient not taken in over a year.      TYLENOL ARTHRITIS PAIN PO   Oral      Vitamin D3 10 MCG (400 UNIT) capsule  Generic drug: Cholecalciferol   Oral, Every Morning      zolpidem 5 MG tablet  Commonly known as: AMBIEN   5 mg, Oral, Nightly PRN             Stop These Medications      ALEVE PO     MOTRIN PO              Discharge Diet: Resume prior.       Activity at Discharge: ambulate.  Restrictions per Dr. Bashir      Follow-up Appointments  Tomas Bashir MD per his orders, in approximately 4 weeks       Christine Terrazas MD  04/18/24  11:02 EDT

## 2024-04-18 NOTE — PLAN OF CARE
Goal Outcome Evaluation:  Plan of Care Reviewed With: patient, spouse           Outcome Evaluation: OT educated pt and spouse on spinal precautions and incorporation into ADL routine, issued necessary AE and educated on use. OT educated pt on home safety, issued and reviewed handouts. She completed bed mobility with CGA, in-room mobility with CGA and LB dressing with supervision. Recommend DC home with initial 24/7 assist and bath bench and RW.      Anticipated Discharge Disposition (OT): home with 24/7 care

## 2024-04-18 NOTE — DISCHARGE INSTRUCTIONS
[facility] COLD THERAPY - PATIENT INSTRUCTION SHEET    Cold Compression Therapy for your comfort and rehabilitation  Your caregivers want you to be productive in your rehab and comfortable during your stay. In keeping with those goals, you will be receiving an SMI Cold Therapy Wrap to help ease post-operative pain and swelling that might keep you from getting back on track! Your SMI Cold Therapy Wrap is effective and simple-to-use, and you will be encouraged to apply it throughout your hospital stay and at home through the duration of your recovery.    When you are ready to go home  Be sure to take your SMI Cold Therapy Wrap and both sets of Gel Bags with you for continued comfort and use throughout your rehabilitation. If you don't already have them, ask your nurse or aide to retrieve your SMI Gel Bags from the patient freezer.    Home use precautions  Always follow your medical professional's application instructions upon discharge. Your SMI Cold Therapy Wrap and Gel Bags are designed to last for months following your surgery. Never heat the Gel Bags unless specified by your healthcare provider. Supervision is advised when using this product on children or geriatric patients. To avoid danger of suffocation, please keep the outer plastic packaging away from children & pets.    Cold Therapy Instructions  Place Gel Bags in a freezer set ¾ of the way to max temperature for at least (4) hours. For best results, lay the Gel Bags flat and [id number] in the freezer. Once frozen, slide Gel Bags into the gel pouch and secure your wrap to the affected area with the straps.  Gel wraps that have been stored in a freezer for an extended period of time may require a (10) minute period of softening up in a room temperature environment before application.  The gel pouch acts as a protective barrier. NEVER place frozen bags directly onto skin, as this may cause frostbite injury.  The SMI Cold Therapy Wrap is designed to be able to be  worm while ambulating. The compression straps can be secured well enough so that the Wrap won't fall off while moving.  Wrap Application Videos can be viewed at [email or web address].  An additional protective barrier such as clothing, a washcloth, hand-towel or pillowcase may be used during prolonged treatment applications.  The Gel-Pouch and Wrap are both Latex-Free and the Gel Bag ingredients are non toxic.    [facility] Wrap care instructions  The [facility] Cold Therapy Wrap may be hand washed and hung to dry when needed.    [facility] re-order information  Additional [facility] body specific wraps and/or Gel Bags can be re-ordered from [email or web address] or call [email or web address] ([phone])

## 2024-04-18 NOTE — CASE MANAGEMENT/SOCIAL WORK
"Discharge Planning Assessment  [facility]     Patient Name: Estefany Bryan  MRN: [id number]  Today's Date: 4/18/2024    Admit Date: 4/17/2024    Plan: Home with family and a rolling walker from [employer]   Discharge Needs Assessment    No documentation.                  Discharge Plan       Row Name 04/18/24 1213       Plan    Plan Home with family and a rolling walker from    Patient/Family in Agreement with Plan yes    Plan Comments Met with Ms. Bryan and her , Tone, at the bedside, for discharge planning.    Ms. Bryan lives with her  in [facility].    She has been evaluated by OT per notes, \"T educated pt and spouse on spinal precautions and incorporation into ADL routine, issued necessary AE and educated on use. OT educated pt on home safety, issued and reviewed handouts. She completed bed mobility with CGA, in-room mobility with CGA and LB dressing with supervision. Recommend DC home with initial 24/7 assist and bath bench and RW.\"    Ms. Bryan requested a rolling walker for home use and did not have preference for provider.  [facility] and [facility] will [facility] walker to the patient's room today.    Ms. Bryan had requested home health.  [facility] chatted Dr. Ibanez and no response as yet.  The patient will likely have to discuss outpatient PT with MD at follow up visit.    PCP is Henry Miles.  Insurance is [facility] commercial with no interruption in coverage.  ACP documents filed to [facility].    DC plan is home with her family.  Ms. Bryan will be transported home by her  when discharged.    CM will continue to follow.    Final Discharge Disposition Code 01 - home or self-care                  Continued Care and Services - Admitted Since 4/17/2024       Durable Medical Equipment       Service Provider Request Status Selected Services Address Phone Fax Patient Preferred    [facility]  Selected Durable Medical Equipment [address], [place] [phone] " [phone] --                  Expected Discharge Date and Time       Expected Discharge Date Expected Discharge Time    Apr 18, 2024               Traci Valdez RN

## 2024-04-18 NOTE — THERAPY EVALUATION
Patient Name: Estefany Bryan  : 1975    MRN: [id number]                              Today's Date: 2024       Admit Date: 2024    Visit Dx:     ICD-10-CM ICD-9-CM   1. S/P lumbar fusion  Z98.1 V45.4     Patient Active Problem List   Diagnosis    Breast cancer, right    Malignant neoplasm of right breast in female, estrogen receptor negative    Sinus tachycardia    History of breast cancer    Menorrhagia    Pelvic pain    Class 1 obesity with body mass index (BMI) of 31.0 to 31.9 in adult    Vulvar lesion    Breast cancer    Back pain    S/P lumbar fusion    Anxiety and depression    GERD without esophagitis     Past Medical History:   Diagnosis Date    Anxiety     Arthritis     IN SI JOINT    Breast cancer, right 01/10/2018    s/p bilateral mastectomy; chemo and radiation as well     Depression     GERD (gastroesophageal reflux disease) 2018    chemo related     H/O Bell's palsy     oct 25 2015 or     High blood pressure     History of chemotherapy     last therapy2018    History of kidney stones     History of radiation therapy     Itchy eyes     bilat - from bells palsy     Low back pain     DUE TO ARTHRITIS    Migraine     HAS NOT HAD A MIGRAINE IN A YEAR    Muscle ache     all over     SOBOE (shortness of breath on exertion)     Tachycardia     Tattoos     BACK AND LEFT HIP    Wears glasses      Past Surgical History:   Procedure Laterality Date    BREAST BIOPSY Bilateral     and a lymphnode on the right. also skin punch biopsies    BREAST RECONSTRUCTION WITH LATISSIMUS MUSCLE FLAP AND IMPLANT INSERTION Bilateral 2020    Procedure: BREAST LATISSIMUS DORSI FLAP WITH TISSUE EXPANDER RIGHT, DELAYED LEFT BREAST RECONSTRUCTION WITH TISSUE EXPANDER AND ALLODERM;  Surgeon: Pablo Lockhart MD;  Location: [facility], [facility];  Service: Plastics;  Laterality: Bilateral;    BREAST RECONSTRUCTION, BREAST TISSUE EXPANDER REMOVAL, IMPLANT INSERTION Bilateral 2020    Procedure: TISSUE  EXPANDER EXCHANGE FOR PERMANENT IMPLANTS BILATERAL;  Surgeon: Pablo Lockhart MD;  Location:  [facility];  Service: Plastics;  Laterality: Bilateral;    COLONOSCOPY  2015    x2    DILATATION AND CURETTAGE      several with several miscarriages     ENDOMETRIAL ABLATION      ENDOSCOPY  2015    MASTECTOMY WITH SENTINEL NODE BIOPSY AND AXILLARY NODE DISSECTION Right 6/21/2018    Procedure: RIGHT SKIN SPARING BREAST MASTECTOMY WITH RIGHT SENTINEL NODE BIOPSY, PROPHYLATIC SKIN SPARING MASTECTOMY, AND REMOVAL OF PORT;  Surgeon: Sandeep Canela MD;  Location:  [facility];  Service: General bilateral mastectomy    PORTACATH PLACEMENT      TONSILLECTOMY      unsure about anoids - 1990     TOTAL LAPAROSCOPIC HYSTERECTOMY N/A 9/5/2019    Procedure: TOTAL ROBOTIC HYSTERECTOMY WITH BILATERAL SALPINGO-OOPHORECTOMY, EXCISION IOF VULVAR SKIN TAG;  Surgeon: Li Faye MD;  Location:  [facility];  Service: DaVinci    VENOUS ACCESS DEVICE (PORT) REMOVAL      WISDOM TOOTH EXTRACTION      all 4- 1994      General Information       Row Name 04/18/24 1136          OT Time and Intention    Document Type evaluation  -AR     Mode of Treatment individual therapy;occupational therapy  -AR       Row Name 04/18/24 1136          General Information    Patient Profile Reviewed yes  -AR     Prior Level of Function min assist:;all household mobility;community mobility;gait;transfer;ADL's  limited with pain, was furniture walking  -AR     Existing Precautions/Restrictions fall;spinal  hemovac  -AR     Barriers to Rehab none identified  -AR       Row Name 04/18/24 1136          Living Environment    People in Home spouse  -AR       Row Name 04/18/24 1136          Home Main Entrance    Number of Stairs, Main Entrance two  -AR     Stair Railings, Main Entrance none  -AR       Row Name 04/18/24 1136          Stairs Within Home, Primary    Stairs, Within Home, Primary Pt can stay on main level of home, has tub/shower and standard commode.  Educated pt and spouse on bthroom and recommend bath bench, educated on safe transfer technique and places to obtain. Pt can stay on main level of home.  -AR       Row Name 04/18/24 1136          Cognition    Orientation Status (Cognition) oriented x 4  -AR       Row Name 04/18/24 1136          Safety Issues, Functional Mobility    Safety Issues Affecting Function (Mobility) awareness of need for assistance;safety precautions follow-through/compliance  -AR     Impairments Affecting Function (Mobility) balance;endurance/activity tolerance;pain;strength  -AR               User Key  (r) = Recorded By, (t) = Taken By, (c) = Cosigned By      Initials Name Provider Type    Graciela Knight, OT Occupational Therapist                     Mobility/ADL's       Row Name 04/18/24 1138          Bed Mobility    Bed Mobility scooting/bridging;supine-sit;sit-supine  -AR     Scooting/Bridging [place] (Bed Mobility) verbal cues;contact guard  -AR     Supine-Sit [place] (Bed Mobility) verbal cues;contact guard  -AR     Sit-Supine [place] (Bed Mobility) verbal cues;contact guard  -AR     Assistive Device (Bed Mobility) bed rails;head of bed elevated  -AR     Comment, (Bed Mobility) Educated pt and spouse on spinal precautions and incorporation of logroll technique to maintain  -AR       Row Name 04/18/24 1138          Transfers    Transfers sit-stand transfer;stand-sit transfer;toilet transfer  -AR     Comment, (Transfers) Pt utilized walker to ambulate to restroom and did not was walker back to bed. Pt limited with pain which increased without walker.  -AR       Row Name 04/18/24 1138          Sit-Stand Transfer    Sit-Stand [place] (Transfers) contact guard;verbal cues  -AR     Comment, (Sit-Stand Transfer) Educated pt and spouse on safe car transfer technique. Min cues upright posture and to use BLE during sit-to-stand transition.  -AR       Row Name 04/18/24 1138          Stand-Sit Transfer    Stand-Sit  [place] (Transfers) contact guard;verbal cues  -AR     Assistive Device (Stand-Sit Transfers) walker, front-wheeled  -AR       Row Name 04/18/24 1138          Toilet Transfer    Type (Toilet Transfer) sit-stand;stand-sit  -AR     [place] Level (Toilet Transfer) contact guard;verbal cues  -AR     Assistive Device (Toilet Transfer) raised toilet seat;grab bars/safety frame;walker, front-wheeled  -AR       Row Name 04/18/24 1138          Functional Mobility    Functional Mobility- Ind. Level contact guard assist;verbal cues required  -AR     Functional Mobility- Device walker, front-wheeled  -AR       Row Name 04/18/24 1138          Activities of Daily Living    BADL Assessment/Intervention bathing;lower body dressing;grooming;toileting  -AR       Row Name 04/18/24 1138          Bathing Assessment/Intervention    Comment, (Bathing) Educated pt and spouse on spinal precautions, issued LH sponge and educated on use.  -AR       Row Name 04/18/24 1138          Lower Body Dressing Assessment/Training    [place] Level (Lower Body Dressing) don;socks;supervision  -AR     Position (Lower Body Dressing) edge of bed sitting  -AR     Comment, (Lower Body Dressing) Educated pt on spinal precautions and incorporation into ADL routine, pt able to don socks via cross-leg technique. Issued reacher and shoe horn and educated on use.  -AR       Row Name 04/18/24 1138          Grooming Assessment/Training    [place] Level (Grooming) verbal cues;wash face, hands;contact guard assist  -AR     Position (Grooming) supported standing;sink side  -AR       Row Name 04/18/24 1138          Toileting Assessment/Training    [place] Level (Toileting) toileting skills;supervision  -AR     Position (Toileting) unsupported standing;unsupported sitting  -AR               User Key  (r) = Recorded By, (t) = Taken By, (c) = Cosigned By      Initials Name Provider Type    Graciela Knight, OT Occupational Therapist                    Obj/Interventions       Row Name 04/18/24 1142          Sensory Assessment (Somatosensory)    Sensory Assessment (Somatosensory) UE sensation intact  -AR       Row Name 04/18/24 1142          Vision Assessment/Intervention    Visual Impairment/Limitations WNL  -AR       [facility] Name 04/18/24 1142          Range of Motion Comprehensive    General Range of Motion no range of motion deficits identified  -AR       Row Name 04/18/24 1142          Strength Comprehensive (MMT)    General Manual Muscle Testing (MMT) Assessment upper extremity strength deficits identified  -AR     Comment, General Manual Muscle Testing (MMT) Assessment BUE grossly 3+/5 BUE  -AR       Row Name 04/18/24 1142          Motor Skills    Motor Skills coordination  -AR     Coordination WNL;bilateral;upper extremity;other (see comments)  opposition and in-hand manipulation  -AR       [facility] Name 04/18/24 1142          Balance    Balance Assessment sitting static balance;sitting dynamic balance;standing static balance;standing dynamic balance  -AR     Static Sitting Balance supervision  -AR     Dynamic Sitting Balance supervision  -AR     Position, Sitting Balance unsupported;sitting edge of bed  -AR     Static Standing Balance contact guard  -AR     Dynamic Standing Balance contact guard  -AR     Position/Device Used, Standing Balance unsupported  -AR               User Key  (r) = Recorded By, (t) = Taken By, (c) = Cosigned By      Initials Name Provider Type    AR Graciela Gilbert OT Occupational Therapist                   Goals/Plan       Row Name 04/18/24 1148          Transfer Goal 1 (OT)    Activity/Assistive Device (Transfer Goal 1, OT) sit-to-stand/stand-to-sit;toilet;walker, rolling  -AR     [place] Level/Cues Needed (Transfer Goal 1, OT) verbal cues required;supervision required  -AR     Time Frame (Transfer Goal 1, OT) long term goal (LTG);by discharge  -AR     Progress/Outcome (Transfer Goal 1, OT) goal ongoing  -MASON [place]  Name 04/18/24 1148          Dressing Goal 1 (OT)    Activity/Device (Dressing Goal 1, OT) lower body dressing  -AR     [place]/Cues Needed (Dressing Goal 1, OT) verbal cues required;supervision required  -AR     Time Frame (Dressing Goal 1, OT) long term goal (LTG);by discharge  -AR     Progress/Outcome (Dressing Goal 1, OT) goal met  -AR       Row Name 04/18/24 1148          Toileting Goal 1 (OT)    Activity/Device (Toileting Goal 1, OT) toileting skills, all;grab bar/safety frame;raised toilet seat  -AR     [place] Level/Cues Needed (Toileting Goal 1, OT) verbal cues required;supervision required  -AR     Time Frame (Toileting Goal 1, OT) long term goal (LTG);by discharge  -AR     Progress/Outcome (Toileting Goal 1, OT) goal ongoing  -AR       Row Name 04/18/24 1148          Therapy Assessment/Plan (OT)    Planned Therapy Interventions (OT) adaptive equipment training;BADL retraining;functional balance retraining;IADL retraining;occupation/activity based interventions;patient/caregiver education/training;transfer/mobility retraining  -AR               User Key  (r) = Recorded By, (t) = Taken By, (c) = Cosigned By      Initials Name Provider Type    Graciela Knight, NEEL Occupational Therapist                   Clinical Impression       Row Name 04/18/24 1144          Pain Assessment    Pretreatment Pain Rating 4/10  -AR     Posttreatment Pain Rating 5/10  -AR     Pain Location lower  -AR     Pain Location - back  -AR     Pre/Posttreatment Pain Comment RN medicated pt at start of session, she declined cold pack  -AR     Pain Intervention(s) Medication (See MAR);Cold applied;Repositioned;Ambulation/increased activity;Nursing Notified  -AR       Row Name 04/18/24 1143          Plan of Care Review    Plan of Care Reviewed With patient;spouse  -AR     Outcome Evaluation OT educated pt and spouse on spinal precautions and incorporation into ADL routine, issued necessary AE and educated on use. OT educated  pt on home safety, issued and reviewed handouts. She completed bed mobility with CGA, in-room mobility with CGA and LB dressing with supervision. Recommend DC home with initial 24/7 assist and bath bench and RW.  -AR       Row Name 04/18/24 1144          Therapy Assessment/Plan (OT)    Rehab Potential (OT) good, to achieve stated therapy goals  -AR     Criteria for Skilled Therapeutic Interventions Met (OT) yes  -AR     Therapy Frequency (OT) daily  -AR       Row Name 04/18/24 1144          Therapy Plan Review/Discharge Plan (OT)    Equipment Needs Upon Discharge (OT) walker, rolling;tub bench  -AR     Anticipated Discharge Disposition (OT) home with 24/7 care  -AR       Row Name 04/18/24 1144          Vital Signs    Pre Patient Position Supine  -AR     Intra Patient Position Standing  -AR     Post Patient Position Sitting  -AR       Row Name 04/18/24 1144          Positioning and Restraints    Pre-Treatment Position in bed  -AR     Post Treatment Position bed  -AR     In Bed notified nsg;side lying right;call light within reach;exit alarm on;encouraged to call for assist;with family/caregiver;SCD pump applied  -AR               User Key  (r) = Recorded By, (t) = Taken By, (c) = Cosigned By      Initials Name Provider Type    Graciela Knight, OT Occupational Therapist                   Outcome Measures       Row Name 04/18/24 1152          How much help from another is currently needed...    Putting on and taking off regular lower body clothing? 3  -AR     Bathing (including washing, rinsing, and drying) 3  -AR     Toileting (which includes using toilet bed pan or urinal) 3  -AR     Putting on and taking off regular upper body clothing 3  -AR     Taking care of personal grooming (such as brushing teeth) 3  -AR     Eating meals 3  -AR     AM-PAC 6 Clicks Score (OT) 18  -AR       Row Name 04/18/24 0850          How much help from another person do you currently need...    Turning from your back to your side  while in flat bed without using bedrails? 4  -TS     Moving from lying on back to sitting on the side of a flat bed without bedrails? 4  -TS     Moving to and from a bed to a chair (including a wheelchair)? 3  -TS     Standing up from a chair using your arms (e.g., wheelchair, bedside chair)? 3  -TS     Climbing 3-5 steps with a railing? 3  -TS     To walk in hospital room? 3  -TS     AM-PAC 6 Clicks Score (PT) 20  -TS     Highest Level of Mobility Goal 6 --> Walk 10 steps or more  -TS       Row Name 04/18/24 1152          Functional Assessment    Outcome Measure Options AM-PAC 6 Clicks Daily Activity (OT)  -AR               User Key  (r) = Recorded By, (t) = Taken By, (c) = Cosigned By      Initials Name Provider Type    Graciela Knight OT Occupational Therapist    Yvonne Oquendo, RN Registered Nurse                    Occupational Therapy Education       Title: PT OT SLP Therapies (Done)       Topic: Occupational Therapy (Done)       Point: ADL training (Done)       Description:   Instruct learner(s) on proper safety adaptation and remediation techniques during self care or transfers.   Instruct in proper use of assistive devices.                  Learning Progress Summary             Patient Eager, E,TB,D,H, DU,VU by AR at 4/18/2024 1152   Family Eager, E,TB,D,H, DU,VU by AR at 4/18/2024 1152                         Point: Precautions (Done)       Description:   Instruct learner(s) on prescribed precautions during self-care and functional transfers.                  Learning Progress Summary             Patient Eager, E,TB,D,H, DU,VU by AR at 4/18/2024 1152   Family Eager, E,TB,D,H, DU,VU by AR at 4/18/2024 1152                         Point: Body mechanics (Done)       Description:   Instruct learner(s) on proper positioning and spine alignment during self-care, functional mobility activities and/or exercises.                  Learning Progress Summary             Patient Eager, E,TB,D,H, DU,VU by  AR at 4/18/2024 1152   Family Kennyer, E,TB,D,H, DU,VU by AR at 4/18/2024 1152                                         User Key       Initials Effective Dates Name Provider Type Discipline    AR 07/11/23 -  Graciela Gilbert, OT Occupational Therapist OT                  OT Recommendation and Plan  Planned Therapy Interventions (OT): adaptive equipment training, BADL retraining, functional balance retraining, IADL retraining, occupation/activity based interventions, patient/caregiver education/training, transfer/mobility retraining  Therapy Frequency (OT): daily  Plan of Care Review  Plan of Care Reviewed With: patient, spouse  Outcome Evaluation: OT educated pt and spouse on spinal precautions and incorporation into ADL routine, issued necessary AE and educated on use. OT educated pt on home safety, issued and reviewed handouts. She completed bed mobility with CGA, in-room mobility with CGA and LB dressing with supervision. Recommend DC home with initial 24/7 assist and bath bench and RW.     Time Calculation:   Evaluation Complexity (OT)  Review Occupational Profile/Medical/Therapy History Complexity: brief/low complexity  Assessment, Occupational Performance/Identification of Deficit Complexity: 1-3 performance deficits  Clinical Decision Making Complexity (OT): problem focused assessment/low complexity  Overall Complexity of Evaluation (OT): low complexity     Time Calculation- OT       Row Name 04/18/24 1153             Time Calculation- OT    OT Start Time 1040  -AR      OT Received On 04/18/24  -AR      OT Goal Re-Cert Due Date 04/28/24  -AR         Timed Charges    79829 - OT Self Care/Mgmt Minutes 11  -AR         Untimed Charges    OT Eval/Re-eval Minutes 59  -AR         Total Minutes    Timed Charges Total Minutes 11  -AR      Untimed Charges Total Minutes 59  -AR       Total Minutes 70  -AR                User Key  (r) = Recorded By, (t) = Taken By, (c) = Cosigned By      Initials Name Provider Type    AR  Graciela Gilbert OT Occupational Therapist                  Therapy Charges for Today       Code Description Service Date Service Provider Modifiers Qty    76000679168 HC OT SELF CARE/MGMT/TRAIN EA 15 MIN 4/18/2024 Graciela Gilbert OT GO 1    10520256648 HC OT EVAL LOW COMPLEXITY 4 4/18/2024 Graciela Gilbert OT GO 1    11990842715 HC OT THER SUPP EA 15 MIN 4/18/2024 Graciela Gilbert OT GO 2                 Graciela Gilbert OT  4/18/2024

## 2024-04-18 NOTE — THERAPY EVALUATION
Patient Name: Estefany Bryan  : 1975    MRN: [id number]                              Today's Date: 2024       Admit Date: 2024    Visit Dx:     ICD-10-CM ICD-9-CM   1. S/P lumbar fusion  Z98.1 V45.4     Patient Active Problem List   Diagnosis    Breast cancer, right    Malignant neoplasm of right breast in female, estrogen receptor negative    Sinus tachycardia    History of breast cancer    Menorrhagia    Pelvic pain    Class 1 obesity with body mass index (BMI) of 31.0 to 31.9 in adult    Vulvar lesion    Breast cancer    Back pain    S/P lumbar fusion    Anxiety and depression    GERD without esophagitis     Past Medical History:   Diagnosis Date    Anxiety     Arthritis     IN SI JOINT    Breast cancer, right 01/10/2018    s/p bilateral mastectomy; chemo and radiation as well     Depression     GERD (gastroesophageal reflux disease) 2018    chemo related     H/O Bell's palsy     oct 25 2015 or     High blood pressure     History of chemotherapy     last therapy2018    History of kidney stones     History of radiation therapy     Itchy eyes     bilat - from bells palsy     Low back pain     DUE TO ARTHRITIS    Migraine     HAS NOT HAD A MIGRAINE IN A YEAR    Muscle ache     all over     SOBOE (shortness of breath on exertion)     Tachycardia     Tattoos     BACK AND LEFT HIP    Wears glasses      Past Surgical History:   Procedure Laterality Date    BREAST BIOPSY Bilateral     and a lymphnode on the right. also skin punch biopsies    BREAST RECONSTRUCTION WITH LATISSIMUS MUSCLE FLAP AND IMPLANT INSERTION Bilateral 2020    Procedure: BREAST LATISSIMUS DORSI FLAP WITH TISSUE EXPANDER RIGHT, DELAYED LEFT BREAST RECONSTRUCTION WITH TISSUE EXPANDER AND ALLODERM;  Surgeon: Pablo Lockhart MD;  [facility]: [facility];  Service: Plastics;  Laterality: Bilateral;    BREAST RECONSTRUCTION, BREAST TISSUE EXPANDER REMOVAL, IMPLANT INSERTION Bilateral 2020    Procedure: TISSUE  EXPANDER EXCHANGE FOR PERMANENT IMPLANTS BILATERAL;  Surgeon: Pablo Lockhart MD;  Location:  [facility];  Service: Plastics;  Laterality: Bilateral;    COLONOSCOPY  2015    x2    DILATATION AND CURETTAGE      several with several miscarriages     ENDOMETRIAL ABLATION      ENDOSCOPY  2015    MASTECTOMY WITH SENTINEL NODE BIOPSY AND AXILLARY NODE DISSECTION Right 6/21/2018    Procedure: RIGHT SKIN SPARING BREAST MASTECTOMY WITH RIGHT SENTINEL NODE BIOPSY, PROPHYLATIC SKIN SPARING MASTECTOMY, AND REMOVAL OF PORT;  Surgeon: Sandeep Canela MD;  Location:  [facility];  Service: General bilateral mastectomy    PORTACATH PLACEMENT      TONSILLECTOMY      unsure about anoids - 1990     TOTAL LAPAROSCOPIC HYSTERECTOMY N/A 9/5/2019    Procedure: TOTAL ROBOTIC HYSTERECTOMY WITH BILATERAL SALPINGO-OOPHORECTOMY, EXCISION IOF VULVAR SKIN TAG;  Surgeon: Li Faye MD;  Location:  [facility];  Service: DaVinci    VENOUS ACCESS DEVICE (PORT) REMOVAL      WISDOM TOOTH EXTRACTION      all 4- 1994      General Information       Row Name 04/18/24 1102          Physical Therapy Time and Intention    Document Type evaluation  -MB     Mode of Treatment physical therapy  -MB       Row Name 04/18/24 1102          General Information    Patient Profile Reviewed yes  -MB     Prior Level of Function min assist:;bed mobility;transfer;gait;all household mobility;community mobility  At recent baseline, pt.'s mobility was limited by pain and she reports she furniture walked. Prior to onset back pain/dysfunction, pt. was independent w/ community ambulation.  -MB     Existing Precautions/Restrictions fall;spinal;other (see comments)  hemovac  -MB     Barriers to Rehab none identified  -MB       Row Name 04/18/24 1102          Living Environment    People in Home spouse  -MB       Row Name 04/18/24 1102          Home Main Entrance    Number of Stairs, Main Entrance two  -MB     Stair Railings, Main Entrance none  -MB       Row Name  04/18/24 1102          Stairs Within Home, Primary    Number of Stairs, Within Home, Primary none  -MB       Row Name 04/18/24 1102          Cognition    Orientation Status (Cognition) oriented x 4  -MB       Row Name 04/18/24 1102          Safety Issues, Functional Mobility    Safety Issues Affecting Function (Mobility) safety precaution awareness  -MB     Impairments Affecting Function (Mobility) balance;endurance/activity tolerance;pain;strength  -MB               User Key  (r) = Recorded By, (t) = Taken By, (c) = Cosigned By      Initials Name Provider Type    MB Nelsy Mock, PT Physical Therapist                   Mobility       Row Name 04/18/24 1443          Bed Mobility    Bed Mobility rolling left;rolling right;sidelying-sit-sidelying  -MB     Rolling Left [place] (Bed Mobility) contact guard;verbal cues  -MB     Rolling Right [place] (Bed Mobility) contact guard;verbal cues  -MB     Sidelying-Sit-Sidelying [place] (Bed Mobility) contact guard;verbal cues  -MB     Assistive Device (Bed Mobility) bed rails  -MB     Comment, (Bed Mobility) Reviewed spinal precautions and log roll technique.  -MB       Row Name 04/18/24 1443          Transfers    Comment, (Transfers) VCs for safe hand placement and avoiding twisting in standing.  -MB       Row Name 04/18/24 1443          Sit-Stand Transfer    Sit-Stand [place] (Transfers) contact guard;verbal cues  -MB     Assistive Device (Sit-Stand Transfers) walker, front-wheeled  -MB       Row Name 04/18/24 1443          Gait/Stairs (Locomotion)    [place] Level (Gait) contact guard;verbal cues  -MB     Assistive Device (Gait) walker, front-wheeled  -MB     Distance in Feet (Gait) 90  -MB     Deviations/Abnormal Patterns (Gait) дмитрий decreased;gait speed decreased;stride length decreased  -MB     Bilateral Gait Deviations forward flexed posture;heel strike decreased  -MB     [place] Level (Stairs) contact guard;verbal cues  -MB      Assistive Device (Stairs) walker, front-wheeled  -MB     Number of Steps (Stairs) 1  -MB     Ascending Technique (Stairs) step-to-step  -MB     Descending Technique (Stairs) step-to-step  -MB     Comment, (Gait/Stairs) Pt. ambulated w/ step through gait pattern w/ slow pace, mild forward flexion, and dec B step length. VCs for dec UE WBing on RW. Pt. negotiated 1 step w/ RW, CGA, and VCs for sequencing. Gait distance limited by fatigue/pain.  -MB               User Key  (r) = Recorded By, (t) = Taken By, (c) = Cosigned By      Initials Name Provider Type    MB Nelsy Mock, PT Physical Therapist                   Obj/Interventions       Row Name 04/18/24 1450          Range of Motion Comprehensive    General Range of Motion bilateral lower extremity ROM WFL  -MB       Row Name 04/18/24 1450          Strength Comprehensive (MMT)    General Manual Muscle Testing (MMT) Assessment lower extremity strength deficits identified  -MB     Comment, General Manual Muscle Testing (MMT) Assessment BLEs not formally tested s/p sx. Pt. able to move BLEs/BUEs against gravity.  -MB       Row Name 04/18/24 1450          Motor Skills    Therapeutic Exercise hip;shoulder;knee;ankle  Issued illustrated/written HEP.  -MB       Row Name 04/18/24 1450          Shoulder (Therapeutic Exercise)    Shoulder (Therapeutic Exercise) AROM (active range of motion)  -MB     Shoulder AROM (Therapeutic Exercise) bilateral;flexion;extension;10 repetitions  -MB       Row Name 04/18/24 1450          Hip (Therapeutic Exercise)    Hip (Therapeutic Exercise) AROM (active range of motion);isometric exercises  -MB     Hip AROM (Therapeutic Exercise) bilateral;flexion;extension;external rotation;internal rotation;10 repetitions  -MB     Hip Isometrics (Therapeutic Exercise) bilateral;gluteal sets;10 repetitions  abd set  -MB       Row Name 04/18/24 1450          Knee (Therapeutic Exercise)    Knee (Therapeutic Exercise) isometric exercises  -MB     Knee  Isometrics (Therapeutic Exercise) bilateral;quad sets;10 repetitions  -MB       Row Name 04/18/24 1450          Ankle (Therapeutic Exercise)    Ankle (Therapeutic Exercise) AROM (active range of motion)  -MB     Ankle AROM (Therapeutic Exercise) bilateral;dorsiflexion;plantarflexion;10 repetitions  -MB       Row Name 04/18/24 1450          Balance    Balance Assessment sitting static balance;standing static balance;standing dynamic balance  -MB     Static Sitting Balance supervision  -MB     Dynamic Sitting Balance supervision  -MB     Position, Sitting Balance supported;sitting edge of bed  -MB     Static Standing Balance contact guard  -MB     Dynamic Standing Balance contact guard  -MB     Position/Device Used, Standing Balance supported;walker, rolling  -MB     Balance Interventions standing;weight shifting activity;sit to stand  -MB       Row Name 04/18/24 1450          Sensory Assessment (Somatosensory)    Sensory Assessment (Somatosensory) LE sensation intact;UE sensation intact  -MB               User Key  (r) = Recorded By, (t) = Taken By, (c) = Cosigned By      Initials Name Provider Type    MB Nelsy Mock, PT Physical Therapist                   Goals/Plan       Row Name 04/18/24 1457          Bed Mobility Goal 1 (PT)    Activity/Assistive Device (Bed Mobility Goal 1, PT) rolling to left;rolling to right;sidelying to sit/sit to sidelying  -MB     [place] Level/Cues Needed (Bed Mobility Goal 1, PT) independent  -MB     Time Frame (Bed Mobility Goal 1, PT) 1 week  -[facility] Name 04/18/24 1457          Transfer Goal 1 (PT)    Activity/Assistive Device (Transfer Goal 1, PT) sit-to-stand/stand-to-sit;bed-to-chair/chair-to-bed;walker, rolling  -MB     [place] Level/Cues Needed (Transfer Goal 1, PT) modified independence  -MB     Time Frame (Transfer Goal 1, PT) 1 week  -MB       Row Name 04/18/24 1457          Gait Training Goal 1 (PT)    Activity/Assistive Device (Gait Training Goal 1,  PT) gait (walking locomotion);decrease fall risk;diminish gait deviation;improve balance and speed;increase endurance/gait distance;walker, rolling  -MB     [place] Level (Gait Training Goal 1, PT) modified independence  -MB     Distance (Gait Training Goal 1, PT) 150  -MB     Time Frame (Gait Training Goal 1, PT) 10 days  -MB       Row Name 04/18/24 1458          Stairs Goal 1 (PT)    Activity/Assistive Device (Stairs Goal 1, PT) ascending stairs;descending stairs;walker, rolling  -MB     [place] Level/Cues Needed (Stairs Goal 1, PT) supervision required  -MB     Number of Stairs (Stairs Goal 1, PT) 1  -MB     Time Frame (Stairs Goal 1, PT) 1 week  -MB       Row Name 04/18/24 3951          Patient Education Goal (PT)    Activity (Patient Education Goal, PT) HEP and spinal precautions  -MB     [place]/Cues/Accuracy (Memory Goal 2, PT) demonstrates adequately  -MB     Time Frame (Patient Education Goal, PT) 1 week  -MB       Row Name 04/18/24 3137          Therapy Assessment/Plan (PT)    Planned Therapy Interventions (PT) balance training;bed mobility training;gait training;home exercise program;patient/family education;postural re-education;stair training;strengthening;transfer training  -MB               User Key  (r) = Recorded By, (t) = Taken By, (c) = Cosigned By      Initials Name Provider Type    Nelsy Bai, PT Physical Therapist                   Clinical Impression       Row Name 04/18/24 8061          Pain    Pretreatment Pain Rating 5/10  -MB     Posttreatment Pain Rating 5/10  -MB     Pain Location lower  -MB     Pain Location - back  -MB     Pre/Posttreatment Pain Comment RN aware and managing.  -MB     Pain Intervention(s) Ambulation/increased activity;Repositioned  -MB       Row Name 04/18/24 2358          Plan of Care Review    Plan of Care Reviewed With patient;spouse  -MB     Progress no change  -MB     Outcome Evaluation PT eval completed. Patient presents w/  post-surgical pain and weakness, mild balance deficits, mildly unsteady gait, and is below her baseline. She completed bed mobility, transfers, and household ambulation w/ RW and CGA. Pt. demonstrates improved safety and activity tolerance w/ use of RW. Recommend home w/ 24/7 assist, RW, and HHPT when medically appropriate.  -MB       Row Name 04/18/24 1453          Therapy Assessment/Plan (PT)    Patient/Family Therapy Goals Statement (PT) Dec pain/independent community ambulation  -MB     Rehab Potential (PT) good, to achieve stated therapy goals  -MB     Criteria for Skilled Interventions Met (PT) yes;meets criteria;skilled treatment is necessary  -MB       Row Name 04/18/24 1453          Vital Signs    Pre Systolic BP Rehab 114  -MB     Pre Treatment Diastolic BP 69  -MB     Pretreatment Heart Rate (beats/min) 78  -MB     Pre SpO2 (%) 94  -MB     O2 Delivery Pre Treatment room air  -MB     O2 Delivery Intra Treatment room air  -MB     O2 Delivery Post Treatment room air  -MB     Pre Patient Position Supine  -MB     Intra Patient Position Standing  -MB     Post Patient Position Supine  -MB       Row Name 04/18/24 1453          Positioning and Restraints    Pre-Treatment Position in bed  -MB     Post Treatment Position bed  -MB     In Bed notified nsg;side lying right;call light within reach;encouraged to call for assist;exit alarm on;with family/caregiver  -MB               User Key  (r) = Recorded By, (t) = Taken By, (c) = Cosigned By      Initials Name Provider Type    Nelsy Bai, PT Physical Therapist                   Outcome Measures       Row Name 04/18/24 1459 04/18/24 0850       How much help from another person do you currently need...    Turning from your back to your side while in flat bed without using bedrails? 4  -MB 4  -TS    Moving from lying on back to sitting on the side of a flat bed without bedrails? 3  -MB 4  -TS    Moving to and from a bed to a chair (including a wheelchair)? 3   -MB 3  -TS    Standing up from a chair using your arms (e.g., wheelchair, bedside chair)? 3  -MB 3  -TS    Climbing 3-5 steps with a railing? 3  -MB 3  -TS    To walk in hospital room? 3  -MB 3  -TS    AM-PAC 6 Clicks Score (PT) 19  -MB 20  -TS    Highest Level of Mobility Goal 6 --> Walk 10 steps or more  -MB 6 --> Walk 10 steps or more  -TS      Row Name 04/18/24 1459 04/18/24 1152       Functional Assessment    Outcome Measure Options AM-PAC 6 Clicks Basic Mobility (PT)  -MB AM-PAC 6 Clicks Daily Activity (OT)  -AR              User Key  (r) = Recorded By, (t) = Taken By, (c) = Cosigned By      Initials Name Provider Type    Graciela Knight, OT Occupational Therapist    MB Nelsy Mock, PT Physical Therapist    TS Yvonne Garcia, RN Registered Nurse                                   PT Recommendation and Plan  Planned Therapy Interventions (PT): balance training, bed mobility training, gait training, home exercise program, patient/family education, postural re-education, stair training, strengthening, transfer training  Plan of Care Reviewed With: patient, spouse  Progress: no change  Outcome Evaluation: PT eval completed. Patient presents w/ post-surgical pain and weakness, mild balance deficits, mildly unsteady gait, and is below her baseline. She completed bed mobility, transfers, and household ambulation w/ RW and CGA. Pt. demonstrates improved safety and activity tolerance w/ use of RW. Recommend home w/ 24/7 assist, RW, and HHPT when medically appropriate.     Time Calculation:   PT Evaluation Complexity  History, PT Evaluation Complexity: 1-2 personal factors and/or comorbidities  Examination of Body Systems (PT Eval Complexity): total of 3 or more elements  Clinical Presentation (PT Evaluation Complexity): evolving  Clinical Decision Making (PT Evaluation Complexity): low complexity  Overall Complexity (PT Evaluation Complexity): low complexity     PT Charges       Row Name 04/18/24 1500              Time Calculation    Start Time 1102  -MB      PT Received On 04/18/24  -MB      PT Goal Re-Cert Due Date 04/28/24  -MB         Timed Charges    14639 - Gait Training Minutes  14  -MB         Untimed Charges    PT Eval/Re-eval Minutes 46  -MB         Total Minutes    Timed Charges Total Minutes 14  -MB      Untimed Charges Total Minutes 46  -MB       Total Minutes 60  -MB                User Key  (r) = Recorded By, (t) = Taken By, (c) = Cosigned By      Initials Name Provider Type    Nelsy Bai, PT Physical Therapist                  Therapy Charges for Today       Code Description Service Date Service Provider Modifiers Qty    40301223297 HC GAIT TRAINING EA 15 MIN 4/18/2024 Nelsy Mock, PT GP 1    03494969404 HC PT EVAL LOW COMPLEXITY 4 4/18/2024 Nelsy Mock, PT GP 1            PT G-Codes  Outcome Measure Options: AM-PAC 6 Clicks Basic Mobility (PT)  AM-PAC 6 Clicks Score (PT): 19  AM-PAC 6 Clicks Score (OT): 18  PT Discharge Summary  Anticipated Discharge Disposition (PT): home with 24/7 care, home with home health    Nelsy Mock, PT  4/18/2024

## 2024-04-18 NOTE — PLAN OF CARE
Goal Outcome Evaluation:  Plan of Care Reviewed With: patient, spouse        Progress: no change  Outcome Evaluation: PT eval completed. Patient presents w/ post-surgical pain and weakness, mild balance deficits, mildly unsteady gait, and is below her baseline. She completed bed mobility, transfers, and household ambulation w/ RW and CGA. Pt. demonstrates improved safety and activity tolerance w/ use of RW. Recommend home w/ 24/7 assist, RW, and HHPT when medically appropriate.      Anticipated Discharge Disposition (PT): home with 24/7 care, home with home health

## 2024-04-18 NOTE — PROGRESS NOTES
"Ortho Spine Progress Note     LOS: 1 day   Patient Care Team:  Henry Miles MD as PCP - General  Winsome Romano MD as Consulting Physician (Hematology and Oncology)  Sandeep Canela MD as Consulting Physician (General Surgery)  Aliza Levin MD as Consulting Physician (Radiation Oncology)    Subjective: Patient doing well.  She says her leg pain is much better.  Eating, voiding, and walking okay.    Objective:   Vital Signs:  /79   Pulse 78   Temp 97.8 °F (36.6 °C) (Oral)   Resp 16   Ht 172.7 cm (68\")   Wt 79.4 kg (175 lb)   LMP 11/29/2017 Comment: last mammogram 2018  SpO2 94%   BMI 26.61 kg/m²     Labs:  Lab Results (last 24 hours)       Procedure Component Value Units Date/Time    Basic Metabolic Panel [[id number]]  (Abnormal) Collected: 04/18/24 0510    Specimen: Blood Updated: 04/18/24 0659     Glucose 98 mg/dL      BUN 9 mg/dL      Creatinine 0.72 mg/dL      Sodium 142 mmol/L      Potassium 4.6 mmol/L      Chloride 111 mmol/L      CO2 25.0 mmol/L      Calcium 8.1 mg/dL      BUN/Creatinine Ratio 12.5     Anion Gap 6.0 mmol/L      eGFR 103.3 mL/min/1.73     Narrative:      GFR Normal >60  Chronic Kidney Disease <60  Kidney Failure <15      CBC (No Diff) [[id number]]  (Abnormal) Collected: 04/18/24 0510    Specimen: Blood Updated: 04/18/24 0642     WBC 10.71 10*3/mm3      RBC 2.87 10*6/mm3      Hemoglobin 8.8 g/dL      Hematocrit 26.7 %      MCV 93.0 fL      MCH 30.7 pg      MCHC 33.0 g/dL      RDW 13.2 %      RDW-SD 45.0 fl      MPV 11.0 fL      Platelets 195 10*3/mm3     POC Glucose Once [[id number]]  (Abnormal) Collected: 04/17/24 1449    Specimen: Blood Updated: 04/17/24 1450     Glucose 197 mg/dL             Awake, alert, oriented.  Motor intact.    Assessment/Plan: Okay to go home if okay with Dr. BENDER  I will send in a prescription for Norco 10.0, #50, for home use.  I discussed follow-up care and restrictions with patient and .  I will see back in approximately 4 " weeks.    Tomas Bashir MD  04/18/24  10:41 EDT

## 2024-04-22 NOTE — PROGRESS NOTES
Pt notified   Pt. developed fine rash on front and L side of neck after completion of Taxol. Pt. denied  other symptoms. Notified Dr. Romano and orders received. Pt. monitored for 30 minutes. Rash resolved without additional meds.

## 2025-03-11 ENCOUNTER — TELEMEDICINE (OUTPATIENT)
Dept: FAMILY MEDICINE CLINIC | Facility: TELEHEALTH | Age: 50
End: 2025-03-11
Payer: COMMERCIAL

## 2025-03-11 DIAGNOSIS — J40 BRONCHITIS: Primary | ICD-10-CM

## 2025-03-11 RX ORDER — MAGNESIUM GLYCINATE 100 MG
CAPSULE ORAL
COMMUNITY
Start: 2025-01-01

## 2025-03-11 RX ORDER — ALBUTEROL SULFATE 90 UG/1
2 INHALANT RESPIRATORY (INHALATION) EVERY 4 HOURS PRN
Qty: 18 G | Refills: 0 | Status: SHIPPED | OUTPATIENT
Start: 2025-03-11

## 2025-03-11 RX ORDER — MULTIVIT-MINERALS/FOLIC ACID 80 MCG
TABLET,CHEWABLE ORAL
COMMUNITY
Start: 2025-01-01

## 2025-03-11 RX ORDER — DEXTROMETHORPHAN HYDROBROMIDE AND PROMETHAZINE HYDROCHLORIDE 15; 6.25 MG/5ML; MG/5ML
5 SYRUP ORAL 4 TIMES DAILY PRN
Qty: 120 ML | Refills: 0 | Status: SHIPPED | OUTPATIENT
Start: 2025-03-11

## 2025-03-11 RX ORDER — DULOXETIN HYDROCHLORIDE 20 MG/1
CAPSULE, DELAYED RELEASE ORAL
COMMUNITY
Start: 2024-02-19

## 2025-03-11 RX ORDER — BENZONATATE 200 MG/1
200 CAPSULE ORAL 3 TIMES DAILY PRN
Qty: 21 CAPSULE | Refills: 0 | Status: SHIPPED | OUTPATIENT
Start: 2025-03-11

## 2025-03-11 RX ORDER — ALPRAZOLAM 0.25 MG
TABLET ORAL EVERY 24 HOURS
COMMUNITY
Start: 2024-02-19

## 2025-03-11 RX ORDER — PREDNISONE 10 MG/1
10 TABLET ORAL 2 TIMES DAILY
Qty: 10 TABLET | Refills: 0 | Status: SHIPPED | OUTPATIENT
Start: 2025-03-11 | End: 2025-03-16

## 2025-03-11 NOTE — PATIENT INSTRUCTIONS
Acute Bronchitis, Adult    Acute bronchitis is sudden inflammation of the main airways (bronchi) that come off the windpipe (trachea) in the lungs. The swelling causes the airways to get smaller and make more mucus than normal. This can make it hard to breathe and can cause coughing or noisy breathing (wheezing).  Acute bronchitis may last several weeks. The cough may last longer. Allergies, asthma, and exposure to smoke may make the condition worse.  What are the causes?  This condition can be caused by germs and by substances that irritate the lungs, including:  Cold and flu viruses. The most common cause of this condition is the virus that causes the common cold.  Bacteria. This is less common.  Breathing in substances that irritate the lungs, including:  Smoke from cigarettes and other forms of tobacco.  Dust and pollen.  Fumes from household cleaning products, gases, or burned fuel.  Indoor or outdoor air pollution.  What increases the risk?  The following factors may make you more likely to develop this condition:  A weak body's defense system, also called the immune system.  A condition that affects your lungs and breathing, such as asthma.  What are the signs or symptoms?  Common symptoms of this condition include:  Coughing. This may bring up clear, yellow, or green mucus from your lungs (sputum).  Wheezing.  Runny or stuffy nose.  Having too much mucus in your lungs (chest congestion).  Shortness of breath.  Aches and pains, including sore throat or chest.  How is this diagnosed?  This condition is usually diagnosed based on:  Your symptoms and medical history.  A physical exam.  You may also have other tests, including tests to rule out other conditions, such as pneumonia. These tests include:  A test of lung function.  Test of a mucus sample to look for the presence of bacteria.  Tests to check the oxygen level in your blood.  Blood tests.  Chest X-ray.  How is this treated?  Most cases of acute  bronchitis clear up over time without treatment. Your health care provider may recommend:  Drinking more fluids to help thin your mucus so it is easier to cough up.  Taking inhaled medicine (inhaler) to improve air flow in and out of your lungs.  Using a vaporizer or a humidifier. These are machines that add water to the air to help you breathe better.  Taking a medicine that thins mucus and clears congestion (expectorant).  Taking a medicine that prevents or stops coughing (cough suppressant).  It is not common to take an antibiotic medicine for this condition.  Follow these instructions at home:    Take over-the-counter and prescription medicines only as told by your health care provider.  Use an inhaler, vaporizer, or humidifier as told by your health care provider.  Take two teaspoons (10 mL) of honey at bedtime to lessen coughing at night.  Drink enough fluid to keep your urine pale yellow.  Do not use any products that contain nicotine or tobacco. These products include cigarettes, chewing tobacco, and vaping devices, such as e-cigarettes. If you need help quitting, ask your health care provider.  Get plenty of rest.  Return to your normal activities as told by your health care provider. Ask your health care provider what activities are safe for you.  Keep all follow-up visits. This is important.  How is this prevented?  To lower your risk of getting this condition again:  Wash your hands often with soap and water for at least 20 seconds. If soap and water are not available, use hand .  Avoid contact with people who have cold symptoms.  Try not to touch your mouth, nose, or eyes with your hands.  Avoid breathing in smoke or chemical fumes. Breathing smoke or chemical fumes will make your condition worse.  Get the flu shot every year.  Contact a health care provider if:  Your symptoms do not improve after 2 weeks.  You have trouble coughing up the mucus.  Your cough keeps you awake at night.  You have  a fever.  Get help right away if you:  Cough up blood.  Feel pain in your chest.  Have severe shortness of breath.  Faint or keep feeling like you are going to faint.  Have a severe headache.  Have a fever or chills that get worse.  These symptoms may represent a serious problem that is an emergency. Do not wait to see if the symptoms will go away. Get medical help right away. Call your local emergency services (911 in the [place] Do not drive yourself to the hospital.  Summary  Acute bronchitis is inflammation of the main airways (bronchi) that come off the windpipe (trachea) in the lungs. The swelling causes the airways to get smaller and make more mucus than normal.  Drinking more fluids can help thin your mucus so it is easier to cough up.  Take over-the-counter and prescription medicines only as told by your health care provider.  Do not use any products that contain nicotine or tobacco. These products include cigarettes, chewing tobacco, and vaping devices, such as e-cigarettes. If you need help quitting, ask your health care provider.  Contact a health care provider if your symptoms do not improve after 2 weeks.  This information is not intended to replace advice given to you by your health care provider. Make sure you discuss any questions you have with your health care provider.  Document Revised: 03/30/2023 Document Reviewed: 04/20/2022  Elsevier Patient Education © 2024 [employer]

## 2025-03-11 NOTE — PROGRESS NOTES
You have chosen to receive care through a telehealth visit.  Do you consent to use a video/audio connection for your medical care today? Yes     Patient or patient representative verbalized consent for the use of Ambient Listening during the visit with  ASTRID Gauthier for chart documentation. 3/11/2025  10:32 EDT    CHIEF COMPLAINT  No chief complaint on file.        HPI  History of Present Illness  The patient is a 49-year-old female presenting with complaints of not feeling well today.    She reports that her  recently fell ill, likely due to exposure from her father, and she began experiencing symptoms approximately 2 days later. Her primary complaint is sinus-related discomfort, which she has attempted to alleviate with two doses of 800 mg Motrin, finding this to be the most effective treatment thus far. She describes difficulty breathing, a persistent cough, and mild nausea, which she attributes to the physical strain of coughing. She also reports a fever of 99 degrees and wheezing. She has been experiencing a slow nasal drip but notes that no significant discharge is currently present. She expresses a preference to avoid steroid treatments due to previous adverse reactions, including mood changes and increased irritability. However, she acknowledges that she may need to consider this option if her symptoms do not improve.    Supplemental Information  She underwent a spinal fusion in 04/2024, which necessitates her to maintain a flat supine position due to inability to flex at the S1, L4, and L5 vertebrae. This positional requirement exacerbates her respiratory distress.    MEDICATIONS  Current: Motrin       Review of Systems  See HPI    Past Medical History:   Diagnosis Date    Anxiety     Arthritis     IN SI JOINT    Breast cancer, right 01/10/2018    s/p bilateral mastectomy; chemo and radiation as well     Depression     GERD (gastroesophageal reflux disease) 01/23/2018    chemo related     H/O  Bell's palsy     oct 25 2015 or     High blood pressure     History of chemotherapy     last therapy2018    History of kidney stones     History of radiation therapy     Itchy eyes     bilat - from bells palsy     Low back pain     DUE TO ARTHRITIS    Migraine     HAS NOT HAD A MIGRAINE IN A YEAR    Muscle ache     all over     SOBOE (shortness of breath on exertion)     Tachycardia     Tattoos     BACK AND LEFT HIP    Wears glasses        Family History   Problem Relation Age of Onset    Breast cancer Cousin 25    Heart disease Mother     No Known Problems Father        Social History     Socioeconomic History    Marital status:    Tobacco Use    Smoking status: Former     Current packs/day: 0.00     Average packs/day: 0.5 packs/day for 20.0 years (10.0 ttl pk-yrs)     Types: Electronic Cigarette, Cigarettes     Start date:      Quit date:      Years since quittin.2    Smokeless tobacco: Never    Tobacco comments:     quit vaping 4/15/20; quit cigarettes in    Vaping Use    Vaping status: Every Day    Start date: 2011    Last attempt to quit: 4/15/2020    Substances: Nicotine   Substance and Sexual Activity    Alcohol use: No    Drug use: Yes     Comment: CBD DELTA 8 GUMMIES NIGHTLY    Sexual activity: Defer     Partners: Male     Birth control/protection: Hysterectomy       Estefany Navarrox  reports that she quit smoking about 14 years ago. Her smoking use included electronic cigarette and cigarettes. She started smoking about 34 years ago. She has a 10 pack-year smoking history. She has never used smokeless tobacco.             LMP 2017 Comment: last mammogram 2018    PHYSICAL EXAM  Physical Exam   Constitutional: She is oriented to person, place, and time. She appears well-developed and well-nourished. She does not have a sickly appearance. She does not appear ill.   HENT:   Head: Normocephalic and atraumatic.   Pulmonary/Chest: Effort normal.  No respiratory  distress (persistent cough during visit; no audible wheezing).  Neurological: She is alert and oriented to person, place, and time.           Diagnoses and all orders for this visit:    1. Bronchitis (Primary)  -     albuterol sulfate  (90 Base) MCG/ACT inhaler; Inhale 2 puffs Every 4 (Four) Hours As Needed for Wheezing.  Dispense: 18 g; Refill: 0  -     promethazine-dextromethorphan (PROMETHAZINE-DM) 6.25-15 MG/5ML syrup; Take 5 mL by mouth 4 (Four) Times a Day As Needed for Cough.  Dispense: 120 mL; Refill: 0  -     benzonatate (TESSALON) 200 MG capsule; Take 1 capsule by mouth 3 (Three) Times a Day As Needed for Cough.  Dispense: 21 capsule; Refill: 0  -     predniSONE (DELTASONE) 10 MG tablet; Take 1 tablet by mouth 2 (Two) Times a Day for 5 days.  Dispense: 10 tablet; Refill: 0    --take medications as prescribed  --increase fluids, rest as needed, tylenol or ibuprofen for pain  --f/u in 3-5 days if no improvement      Assessment & Plan  1. Potential bronchitis.  Given the early stage of her symptoms, it is premature to definitively diagnose a bacterial infection. However, the presence of a low-grade fever suggests a possible case of bronchitis, which is typically viral in nature and therefore contagious. A prescription for prednisone 10 mg, to be taken once or twice daily for a duration of 5 days, has been provided. Additionally, an albuterol inhaler has been prescribed, with instructions to administer 2 puffs every 4 hours as needed for wheezing or shortness of breath. For cough management, Tessalon Perles have been prescribed, to be taken one every 8 hours as needed. Should the Tessalon Perles prove ineffective, promethazine DM has been prescribed as an alternative, with a warning that it may induce drowsiness and should be taken every 6 hours if necessary. If there is no improvement in her condition within the next 3 to 5 days, she is advised to seek immediate medical attention at an urgent care  facility to rule out the possibility of pneumonia.         FOLLOW-UP  As discussed during visit with PCP/Virtual Care if no improvement or Urgent Care/Emergency Department if worsening of symptoms    Patient verbalizes understanding of medication dosage, comfort measures, instructions for treatment and follow-up.    Marilynn Stover, APRN  03/11/2025  10:32 EDT    Mode of Visit: Video  Location of patient: -HOME-  Location of provider: +HOME+  You have chosen to receive care through a telehealth visit.  The patient has signed the video visit consent form.  The visit included audio and video interaction. No technical issues occurred during this visit.    The use of a video visit has been reviewed with the patient and verbal informed consent has been obtained. Myself and Estefany Bryan     participated in this visit. The patient is located in [address] [place] [address]  I am located in [place], [place]. [employer] and [employer] Video Client were utilized. I spent 4 minutes in the patient's chart for this visit.      Note Disclaimer: At [facility], we believe that sharing information builds trust and better   relationships. You are receiving this note because you recently visited [facility]. It is possible you   will see health information before a provider has talked with you about it. This kind of information can   be easy to misunderstand. To help you fully understand what it means for your health, we urge you to   discuss this note with your provider.

## (undated) DEVICE — SUT GUT PLN FAST ABS 5/0 PC1 18IN 1915G

## (undated) DEVICE — CVR HNDL LIGHT RIGID

## (undated) DEVICE — NDL HYPO ECLPS SFTY 18G 1 1/2IN

## (undated) DEVICE — DISPOSABLE BIPOLAR FORCEPS 5 1/2" (14CM) SEMKIN, 0.7MM TIP AND 12 FT. (3.6M) CABLE: Brand: KIRWAN

## (undated) DEVICE — INTENDED USE FOR SURGICAL MARKING ON INTACT SKIN, ALSO PROVIDES A PERMANENT METHOD OF IDENTIFYING OBJECTS IN THE OPERATING ROOM: Brand: WRITESITE® REGULAR TIP SKIN MARKER

## (undated) DEVICE — ANTIBACTERIAL UNDYED BRAIDED (POLYGLACTIN 910), SYNTHETIC ABSORBABLE SUTURE: Brand: COATED VICRYL

## (undated) DEVICE — ANTIBACTERIAL UNDYED BRAIDED (POLYGLACTIN 910), SYNTHETIC ABSORBABLE SURGICAL SUTURE: Brand: COATED VICRYL

## (undated) DEVICE — PROXIMATE RH ROTATING HEAD SKIN STAPLERS (35 WIDE) CONTAINS 35 STAINLESS STEEL STAPLES: Brand: PROXIMATE

## (undated) DEVICE — SUT MNCRYL PLS ANTIB UD 4/0 PS2 18IN

## (undated) DEVICE — LEX GENERAL BREAST: Brand: MEDLINE INDUSTRIES, INC.

## (undated) DEVICE — GLV SURG DERMASSURE GRN LF PF 7.0

## (undated) DEVICE — SUT MNCRYL PLS ANTIB UD 3/0 PS2 27IN

## (undated) DEVICE — SHEET, DRAPE, SPLIT, STERILE: Brand: MEDLINE

## (undated) DEVICE — COVER,LIGHT HANDLE,FLX,1/PK: Brand: MEDLINE INDUSTRIES, INC.

## (undated) DEVICE — DECANT BG O JET

## (undated) DEVICE — SUT SILK 2/0 SH 30IN K833H

## (undated) DEVICE — ELECTRD BLD EDGE/INSUL1P 2.4X5.1MM STRL

## (undated) DEVICE — DRP SLUSH WARM STRL 44X44IN

## (undated) DEVICE — OBT BLADLES ENDOWRIST DAVINCI/S 8MM

## (undated) DEVICE — 2, DISPOSABLE SUCTION/IRRIGATOR WITHOUT DISPOSABLE TIP: Brand: STRYKEFLOW

## (undated) DEVICE — PK CHST BRST 10

## (undated) DEVICE — PAD ARMBRD SURG CONVOL 7.5X20X2IN

## (undated) DEVICE — GOWN,NON-REINFORCED,SIRUS,SET IN SLV,XL: Brand: MEDLINE

## (undated) DEVICE — CAMERA/LASER ARM DRAPE: Brand: DEROYAL

## (undated) DEVICE — 3M™ STERI-STRIP™ REINFORCED ADHESIVE SKIN CLOSURES, R1547, 1/2 IN X 4 IN (12 MM X 100 MM), 6 STRIPS/ENVELOPE: Brand: 3M™ STERI-STRIP™

## (undated) DEVICE — Device

## (undated) DEVICE — SKIN AFFIX SURG ADHESIVE 72/CS 0.55ML: Brand: MEDLINE

## (undated) DEVICE — OCCL COLPO PNEUMO  STRL

## (undated) DEVICE — DRAPE,TOP,102X53,STERILE: Brand: MEDLINE

## (undated) DEVICE — GLV SURG BIOGEL LTX PF 7 1/2

## (undated) DEVICE — SYS SKIN CLS DERMABOND PRINEO W/22CM MESH TP

## (undated) DEVICE — MEDI-VAC NON-CONDUCTIVE SUCTION TUBING: Brand: CARDINAL HEALTH

## (undated) DEVICE — GLV SURG TRIUMPH ORTHO W/ALOE PF LTX 7.5 STRL

## (undated) DEVICE — SYR LUERLOK 50ML

## (undated) DEVICE — SEAL CANN CAM ENDOWRIST DAVINCI/S 8.5MM

## (undated) DEVICE — 3M™ BAIR HUGGER® UNDERBODY BLANKET, ADULT, 10 PER CASE 54500: Brand: BAIR HUGGER™

## (undated) DEVICE — IRRIGATOR BULB ASEPTO 60CC STRL

## (undated) DEVICE — APPL DURAPREP IODOPHOR APL 26ML

## (undated) DEVICE — SUT SILK 2/0 PS 18IN 1588H

## (undated) DEVICE — 3M™ DURAPORE™ SURGICAL TAPE 1538-3, 3 INCH X 10 YARD (7,5CM X 9,1M), 4 ROLLS/BOX: Brand: 3M™ DURAPORE™

## (undated) DEVICE — MEDI-VAC YANKAUER SUCTION HANDLE W/BULBOUS TIP: Brand: CARDINAL HEALTH

## (undated) DEVICE — ELECTRD BLD EXT EDGE/INSUL 1P 4IN

## (undated) DEVICE — APPL COTN TP PLSTC 6IN STRL LF PK/2

## (undated) DEVICE — DRAIN JACKSON PRATT ROUND 15FR: Brand: CARDINAL HEALTH

## (undated) DEVICE — COVADERM: Brand: DEROYAL

## (undated) DEVICE — SUT ETHLN 3/0 FS1 30IN 669H

## (undated) DEVICE — VLV 4 NDL/FREE SAFESITE W CAP ASP/INJ

## (undated) DEVICE — BANDAGE,GAUZE,BULKEE II,4.5"X4.1YD,STRL: Brand: MEDLINE

## (undated) DEVICE — BLAKE SILICONE DRAIN, 15 FR ROUND, HUBLESS WITH 3/16" TROCAR: Brand: BLAKE

## (undated) DEVICE — JACKSON-PRATT 100CC BULB RESERVOIR: Brand: CARDINAL HEALTH

## (undated) DEVICE — DRP ADAPT ALLY UTER POSTN SYS 1P/U

## (undated) DEVICE — SUT SILK 3/0 TIES 18IN A184H

## (undated) DEVICE — TROCARS: Brand: KII® OPTICAL ACCESS SYSTEM

## (undated) DEVICE — AIRWY 90MM NO9

## (undated) DEVICE — BRA COMPR SURG STL958 QUEEN

## (undated) DEVICE — TOTAL TRAY, 16FR 10ML SIL FOLEY, URN: Brand: MEDLINE

## (undated) DEVICE — ADAPT ST INFUS ADMIN SYR 70IN

## (undated) DEVICE — TUBING, SUCTION, 1/4" X 10', STRAIGHT: Brand: MEDLINE

## (undated) DEVICE — NDL HYPO ECLPS SFTY 22G 1 1/2IN

## (undated) DEVICE — SHEET,DRAPE,40X58,STERILE: Brand: MEDLINE

## (undated) DEVICE — CANNULA,OXY,ADULT,SUPERSOFT,W/7'TUB,UC: Brand: MEDLINE

## (undated) DEVICE — KT POSTN TRENDELENBURG DLX WING PAD TABL STRAP HDRST XL 1P/U

## (undated) DEVICE — BIOPATCH™ ANTIMICROBIAL DRESSING WITH CHLORHEXIDINE GLUCONATE IS A HYDROPHILLIC POLYURETHANE ABSORPTIVE FOAM WITH CHLORHEXIDINE GLUCONATE (CHG) WHICH INHIBITS BACTERIAL GROWTH UNDER THE DRESSING. THE DRESSING IS INTENDED TO BE USED TO ABSORB EXUDATE, COVER A WOUND CAUSED BY VASCULAR AND NONVASCULAR PERCUTANEOUS MEDICAL DEVICES DURING SURGERY, AS WELL AS REDUCE LOCAL INFECTION AND COLONIZATION OF MICROORGANISMS.: Brand: BIOPATCH

## (undated) DEVICE — 3M™ STERI-DRAPE™ INCISE DRAPE, XL 1051: Brand: STERI-DRAPE™

## (undated) DEVICE — DRSNG TELFA PAD NONADH STR 1S 3X8IN

## (undated) DEVICE — SUT VIC 2/0 CT2 27IN J269H

## (undated) DEVICE — FLTR PLUMEPORT LAP W/CONN STRL

## (undated) DEVICE — HDRST POSTIN FM CRDL TRACH SLOT 9X8X4IN

## (undated) DEVICE — [HIGH FLOW INSUFFLATOR,  DO NOT USE IF PACKAGE IS DAMAGED,  KEEP DRY,  KEEP AWAY FROM SUNLIGHT,  PROTECT FROM HEAT AND RADIOACTIVE SOURCES.]: Brand: PNEUMOSURE

## (undated) DEVICE — CLTH CLENS READYCLEANSE PERI CARE PK/5

## (undated) DEVICE — TIP COVER ACCESSORY

## (undated) DEVICE — PK MAJ GYN DAVINCI 10

## (undated) DEVICE — STPLR SKIN SUBCUTICULAR INSORB 2030

## (undated) DEVICE — APPL CHLORAPREP W/TINT 26ML BLU

## (undated) DEVICE — CONN TBG Y 5 IN 1 LF STRL

## (undated) DEVICE — MANIP UTER RUMI TP 6.7MMX8CM BLU

## (undated) DEVICE — DISH PETRI 3.5IN MD STRL LF